# Patient Record
Sex: FEMALE | Race: WHITE | Employment: UNEMPLOYED | URBAN - NONMETROPOLITAN AREA
[De-identification: names, ages, dates, MRNs, and addresses within clinical notes are randomized per-mention and may not be internally consistent; named-entity substitution may affect disease eponyms.]

---

## 2020-09-25 ENCOUNTER — VIRTUAL VISIT (OUTPATIENT)
Dept: FAMILY MEDICINE CLINIC | Age: 45
End: 2020-09-25
Payer: MEDICAID

## 2020-09-25 DIAGNOSIS — Z00.01 ENCOUNTER FOR WELL ADULT EXAM WITH ABNORMAL FINDINGS: Primary | ICD-10-CM

## 2020-09-25 DIAGNOSIS — Z00.01 ENCOUNTER FOR WELL ADULT EXAM WITH ABNORMAL FINDINGS: ICD-10-CM

## 2020-09-25 DIAGNOSIS — L40.9 PSORIASIS: ICD-10-CM

## 2020-09-25 PROCEDURE — 99204 OFFICE O/P NEW MOD 45 MIN: CPT | Performed by: NURSE PRACTITIONER

## 2020-09-25 NOTE — PROGRESS NOTES
Tyshawn Callaway presents today for   Chief Complaint   Patient presents with    New Patient    Joint Swelling     Patient's psoriatic arthritis has been bothering her a lot and swelling up    Psoriasis    Fibromyalgia         Depression Screening:  3 most recent PHQ Screens 9/25/2020   Little interest or pleasure in doing things Several days   Feeling down, depressed, irritable, or hopeless Nearly every day   Total Score PHQ 2 4   Trouble falling or staying asleep, or sleeping too much Several days   Feeling tired or having little energy Nearly every day   Poor appetite, weight loss, or overeating Several days   Feeling bad about yourself - or that you are a failure or have let yourself or your family down Several days   Trouble concentrating on things such as school, work, reading, or watching TV Nearly every day   Moving or speaking so slowly that other people could have noticed; or the opposite being so fidgety that others notice Several days   Thoughts of being better off dead, or hurting yourself in some way Not at all   PHQ 9 Score 14   How difficult have these problems made it for you to do your work, take care of your home and get along with others Somewhat difficult       Learning Assessment:  Learning Assessment 9/25/2020   PRIMARY LEARNER Patient   HIGHEST LEVEL OF EDUCATION - PRIMARY LEARNER  SOME COLLEGE   BARRIERS PRIMARY LEARNER NONE   CO-LEARNER CAREGIVER No   PRIMARY LANGUAGE ENGLISH   LEARNER PREFERENCE PRIMARY LISTENING   ANSWERED BY patient   RELATIONSHIP SELF         Health Maintenance reviewed and discussed and ordered per Provider. Health Maintenance Due   Topic Date Due    DTaP/Tdap/Td series (1 - Tdap) 10/19/1995    PAP AKA CERVICAL CYTOLOGY  10/19/1995    Lipid Screen  10/19/2014    Flu Vaccine (1) 09/01/2020   . Coordination of Care:  1. Have you been to the ER, urgent care clinic since your last visit? Hospitalized since your last visit? No    2.  Have you seen or consulted any other health care providers outside of the [address] [place] [address] since your last visit? Include any pap smears or colon screening.  No

## 2020-09-28 NOTE — PATIENT INSTRUCTIONS
Psoriasis: Care Instructions Your Care Instructions Psoriasis (say \"jnu-GS-rh-zackary\") is a long-term skin problem that causes thick, white, silvery, or red patches on the skin. The patches may be small or large, and they occur most often on the knees, elbows, scalp, hands, feet, or lower back. The skin may be scaly. If the condition is severe, your skin can become itchy and tender. Psoriasis also can be embarrassing if the patches are on visible areas. You can treat psoriasis with good care at home and with medicine from your doctor. You may put medicine on your skin and take pills or have shots to stop the redness and swelling. Your doctor also may suggest ultraviolet light treatments. Follow-up care is a key part of your treatment and safety. Be sure to make and go to all appointments, and call your doctor if you are having problems. It's also a good idea to know your test results and keep a list of the medicines you take. How can you care for yourself at home? · If your doctor prescribes medicine, use it exactly as prescribed. Follow your doctor's advice for sunlight or ultraviolet light treatment. Call your doctor if you think you are having a problem with your medicine. · Protect your skin: 
? Keep your skin moist. After bathing, put an ointment, cream, or lotion on your skin while it is still damp. This seals in moisture. Use over-the-counter products that your doctor suggests. These may include Cetaphil, Lubriderm, or Eucerin. Petroleum jelly (such as Vaseline) and vegetable shortening (such as Crisco) also work. ? If you have psoriasis on your scalp, use a shampoo with salicylic acid, such as Neutrogena T/Sal. 
? Avoid harsh skin products, such as those that contain alcohol. ? Cover your skin in cold weather. ? Try to prevent sunburn.  Although short periods of sun exposure reduce psoriasis in most people, too much sun can damage the skin and cause skin cancer. In addition, sunburns can trigger psoriasis. Use sunscreen on areas of your skin that do not have psoriasis. Make sure to use a broad-spectrum sunscreen that has a sun protection factor (SPF) of 30 or higher. Use it every day, even when it is cloudy. ? Take care to avoid accidents such as cutting or scraping your skin. An injury to the skin can cause psoriasis patches to form anywhere on the body, including the area of the injury. ? Avoid tight shoes, clothing, watchbands, and hats. These may irritate your skin. ? Use a vaporizer or humidifier to add moisture to your bedroom. Follow the directions for cleaning the machine. · Try making one or more changes to your daily habits to help with managing your psoriasis. For example: ? Try to control stress and anxiety. They may cause psoriasis to appear suddenly or can make symptoms worse. ? If you smoke, think about quitting. If you need help quitting, talk to your doctor about stop-smoking programs and medicines. ? If you drink, limit or reduce the amount of alcohol you drink. ? If you are overweight, see if you can lose some weight. · Seek support from family and friends. Talk to a counselor or other professional if you feel sad about your condition and need more help. When should you call for help? Call your doctor now or seek immediate medical care if: 
  · You have signs of infection, such as: 
? Increased pain, swelling, warmth, or redness. ? Red streaks leading from the area. ? Pus draining from the area. ? A fever. Watch closely for changes in your health, and be sure to contact your doctor if: 
  · You have swelling, stiffness, or pain in your joints.  
  · You do not get better as expected. Where can you learn more? Go to http://www.gray.com/ Enter O300 in the search box to learn more about \"Psoriasis: Care Instructions. \" Current as of: July 2, 2020               Content Version: 12.6 © 3768-7184 [employer]. Care instructions adapted under license by [employer] (which disclaims liability or warranty for this information). If you have questions about a medical condition or this instruction, always ask your healthcare professional. [address] any warranty or liability for your use of this information.

## 2020-09-28 NOTE — PROGRESS NOTES
Diana Garcia is a 38 y. o.female who presents today via virtual video visit for   Chief Complaint   Patient presents with    New Patient    Joint Swelling     Patient's psoriatic arthritis has been bothering her a lot and swelling up    Psoriasis    Fibromyalgia       58-year-old female presents today via virtual video visit to establish care. Patient states she has medical history significant for psoriatic attic arthritis as well as fibromyalgia. She states she was \"managing this herself with herbs and tinctures at home \". She states that she finds that the \"itching is way more severe \". She states she has been dealing with this for the past 10 years. She states she has not seen rheumatology in many years. Patient states that due to financial constraints she does not choose to see rheumatology at this time. Subjective:           Past Medical History:   Diagnosis Date    Fibromyalgia     IBS (irritable bowel syndrome)     Psoriasis     Psoriatic arthritis (HCC)      Past Surgical History:   Procedure Laterality Date    HX ORTHOPAEDIC      ankle    HX WISDOM TEETH EXTRACTION       Social History     Tobacco Use    Smoking status: Never Smoker    Smokeless tobacco: Never Used   Substance and Sexual Activity    Alcohol use: Yes     Comment: social    Drug use: Never       Allergies   Allergen Reactions    Penicillins Hives     The patient has a family history of    REVIEW OF SYSTEMS  Review of Systems   Constitutional: Negative for chills and fever. HENT: Negative for ear discharge, ear pain, hearing loss, sinus pain and sore throat. Eyes: Negative for pain. Respiratory: Negative for cough and shortness of breath. Cardiovascular: Negative for chest pain, palpitations and leg swelling. Gastrointestinal: Negative for abdominal pain, nausea and vomiting. Genitourinary: Negative for dysuria, frequency and urgency. Musculoskeletal: Negative for falls, myalgias and neck pain.    Skin: Positive for rash. Neurological: Negative for dizziness, tingling, tremors and headaches. Psychiatric/Behavioral: Negative for depression, substance abuse and suicidal ideas. The patient is not nervous/anxious and does not have insomnia. Objective: There were no vitals taken for this visit. No current outpatient medications     PHYSICAL EXAM  Physical Exam  Constitutional:       Appearance: She is obese. HENT:      Head: Normocephalic and atraumatic. Eyes:      General: No scleral icterus. Right eye: No discharge. Left eye: No discharge. Pulmonary:      Effort: Pulmonary effort is normal. No respiratory distress. Musculoskeletal:         General: No swelling. Right lower leg: No edema. Left lower leg: No edema. Skin:     Coloration: Skin is not pale. Findings: No erythema or rash. Neurological:      General: No focal deficit present. Mental Status: She is alert and oriented to person, place, and time. Psychiatric:         Mood and Affect: Mood normal.         Behavior: Behavior normal.         Thought Content: Thought content normal.         Judgment: Judgment normal.         Assessment/Plan:     Diagnoses and all orders for this visit:    1. Encounter for well adult exam with abnormal findings  -     CBC W/O DIFF; Future  -     LIPID PANEL; Future  -     METABOLIC PANEL, COMPREHENSIVE; Future  -     VITAMIN B12; Future  -     VITAMIN D, 25 HYDROXY; Future  -     TSH 3RD GENERATION; Future  -Labs today any changes to current treatment contingent upon those findings    2. Psoriasis  -     REFERRAL TO DERMATOLOGY  Patient is agreeable to referral to dermatology      Follow-up and Dispositions    · Return in about 1 month (around 10/25/2020) for in office, reassessment.           Terri Wolfe, who was evaluated through a synchronous (real-time) audio-video encounter, and/or her healthcare decision maker, is aware that it is a billable service, with coverage as determined by her insurance carrier. She provided verbal consent to proceed: Yes, and patient identification was verified. It was conducted pursuant to the emergency declaration under the [facility], [address] [place] [address] authority and the [employer] General Act. A caregiver was present when appropriate. Ability to conduct physical exam was limited. I was in the office. The patient was at home. Disclaimer:    I have discussed the diagnosis with the patient and the intended plan as seen above. The patient understands our medical plan. The risks, benefits and significant side effects of all medications have been reviewed. Anticipated time course and progression of condition reviewed. All questions have been addressed. She received an after visit summary, with information reviewed, and questions answered. Where appropriate, she is instructed to call the clinic if she has not been notified either by phone or through [address] with the results of her tests or with an appointment plan for any referrals within 1 week(s). The patient  is to call if her condition worsens or fails to improve or if significant side effects are experienced.        Elio Kong NP

## 2020-10-12 ENCOUNTER — TELEPHONE (OUTPATIENT)
Dept: FAMILY MEDICINE CLINIC | Age: 45
End: 2020-10-12

## 2020-10-12 NOTE — TELEPHONE ENCOUNTER
Patient called in stating that at her last appt. She was suppose to have a script called in for her stomach. Please advise.  benji Childress

## 2020-10-21 NOTE — TELEPHONE ENCOUNTER
She said that you told her that you would try her on an old school drug due to her fibromyalgia and IBS. She said she has a lot of GI issues due to this. She said that it she talked to you about red moon herbs also. She said that during her apt, we had an emergency in the office so maybe this was why you did not remember about the medication.

## 2020-10-22 RX ORDER — DICYCLOMINE HYDROCHLORIDE 10 MG/1
10 CAPSULE ORAL 3 TIMES DAILY
Qty: 90 CAP | Refills: 0 | Status: SHIPPED | OUTPATIENT
Start: 2020-10-22 | End: 2020-12-01

## 2020-10-23 RX ORDER — DICYCLOMINE HYDROCHLORIDE 10 MG/1
10 CAPSULE ORAL 3 TIMES DAILY
Qty: 90 CAP | Refills: 1 | Status: SHIPPED | OUTPATIENT
Start: 2020-10-23 | End: 2021-02-05 | Stop reason: SDUPTHER

## 2020-10-23 NOTE — TELEPHONE ENCOUNTER
[facility] The first prescription printed out instead of going to the pharmacy.

## 2020-12-01 ENCOUNTER — OFFICE VISIT (OUTPATIENT)
Dept: FAMILY MEDICINE CLINIC | Age: 45
End: 2020-12-01
Payer: MEDICAID

## 2020-12-01 VITALS
HEART RATE: 90 BPM | WEIGHT: 190 LBS | BODY MASS INDEX: 34.96 KG/M2 | OXYGEN SATURATION: 98 % | HEIGHT: 62 IN | DIASTOLIC BLOOD PRESSURE: 100 MMHG | RESPIRATION RATE: 19 BRPM | TEMPERATURE: 97 F | SYSTOLIC BLOOD PRESSURE: 170 MMHG

## 2020-12-01 DIAGNOSIS — L40.50 PSORIATIC ARTHRITIS (HCC): ICD-10-CM

## 2020-12-01 DIAGNOSIS — M54.31 SCIATICA OF RIGHT SIDE: ICD-10-CM

## 2020-12-01 DIAGNOSIS — M79.7 FIBROMYALGIA: Primary | ICD-10-CM

## 2020-12-01 DIAGNOSIS — R03.0 ELEVATED BLOOD-PRESSURE READING WITHOUT DIAGNOSIS OF HYPERTENSION: ICD-10-CM

## 2020-12-01 PROCEDURE — 96372 THER/PROPH/DIAG INJ SC/IM: CPT

## 2020-12-01 PROCEDURE — 99214 OFFICE O/P EST MOD 30 MIN: CPT

## 2020-12-01 RX ORDER — CYCLOBENZAPRINE HCL 10 MG
TABLET ORAL
Qty: 30 TAB | Refills: 1 | Status: SHIPPED | OUTPATIENT
Start: 2020-12-01 | End: 2021-01-18

## 2020-12-01 RX ORDER — DEXTROMETHORPHAN HYDROBROMIDE, GUAIFENESIN 5; 100 MG/5ML; MG/5ML
650 LIQUID ORAL EVERY 8 HOURS
COMMUNITY
End: 2021-10-27 | Stop reason: ALTCHOICE

## 2020-12-01 RX ORDER — PREDNISONE 10 MG/1
TABLET ORAL
Qty: 21 TAB | Refills: 0 | Status: SHIPPED | OUTPATIENT
Start: 2020-12-01 | End: 2021-01-05 | Stop reason: ALTCHOICE

## 2020-12-01 RX ORDER — IBUPROFEN 200 MG
600 TABLET ORAL
COMMUNITY
End: 2021-02-23

## 2020-12-01 NOTE — PROGRESS NOTES
50-year-old female presents today in office for follow-up related to fibromyalgia, psoriatic arthritis, psoriasis as well as right-sided sciatic pain. Patient rates current overall pain 10 out of 10. She is excessively tearful yet consolable during today's assessment. She states it has been many years since she has been followed by rheumatology or dermatology. She is not currently on any medications for her above-mentioned diagnoses. Subjective:           Past Medical History:   Diagnosis Date    Fibromyalgia     IBS (irritable bowel syndrome)     Psoriasis     Psoriatic arthritis (HCC)      Past Surgical History:   Procedure Laterality Date    HX ORTHOPAEDIC      ankle    HX WISDOM TEETH EXTRACTION       Social History     Tobacco Use    Smoking status: Never Smoker    Smokeless tobacco: Never Used   Substance and Sexual Activity    Alcohol use: Yes     Comment: social    Drug use: Never     Current Outpatient Medications   Medication Sig Dispense Refill    ibuprofen (MOTRIN) 200 mg tablet Take 600 mg by mouth every six (6) hours as needed for Pain.  acetaminophen (Tylenol Arthritis Pain) 650 mg TbER Take 650 mg by mouth every eight (8) hours.  predniSONE (STERAPRED DS) 10 mg dose pack See administration instruction per 10mg dose pack 21 Tab 0    cyclobenzaprine (FLEXERIL) 10 mg tablet 1 tab po q8h prn pain 30 Tab 1    dicyclomine (BENTYL) 10 mg capsule Take 1 Cap by mouth three (3) times daily. 90 Cap 1     Allergies   Allergen Reactions    Penicillins Hives     The patient has a family history of    REVIEW OF SYSTEMS  Review of Systems   Constitutional: Negative for chills and fever. HENT: Negative for ear discharge, ear pain, hearing loss, sinus pain and sore throat. Eyes: Negative for pain. Respiratory: Negative for cough and shortness of breath. Cardiovascular: Negative for chest pain, palpitations and leg swelling.    Gastrointestinal: Negative for abdominal pain, nausea and vomiting. Genitourinary: Negative for dysuria, frequency and urgency. Musculoskeletal: Positive for back pain, joint pain, myalgias and neck pain. Negative for falls. Skin: Positive for itching and rash. Neurological: Negative for dizziness, tingling, tremors and headaches. Psychiatric/Behavioral: Negative for depression, substance abuse and suicidal ideas. The patient is not nervous/anxious and does not have insomnia. Objective:     Visit Vitals  BP (!) 170/100 (BP 1 Location: Left arm, BP Patient Position: Sitting)   Pulse 90   Temp 97 °F (36.1 °C) (Temporal)   Resp 19   Ht 5' 2\" (1.575 m)   Wt 190 lb (86.2 kg)   SpO2 98%   BMI 34.75 kg/m²       Current Outpatient Medications   Medication Instructions    acetaminophen (TYLENOL ARTHRITIS PAIN) 650 mg, Oral, EVERY 8 HOURS    cyclobenzaprine (FLEXERIL) 10 mg tablet 1 tab po q8h prn pain    dicyclomine (BENTYL) 10 mg, Oral, 3 TIMES DAILY    ibuprofen (MOTRIN) 600 mg, Oral, EVERY 6 HOURS AS NEEDED    predniSONE (STERAPRED DS) 10 mg dose pack See administration instruction per 10mg dose pack        PHYSICAL EXAM  Physical Exam  Constitutional:       General: She is in acute distress. Appearance: She is not ill-appearing. HENT:      Head: Normocephalic and atraumatic. Right Ear: Tympanic membrane, ear canal and external ear normal.      Left Ear: Tympanic membrane, ear canal and external ear normal.      Nose: Nose normal.      Mouth/Throat:      Mouth: Mucous membranes are moist.      Pharynx: Oropharynx is clear. Eyes:      Extraocular Movements: Extraocular movements intact. Conjunctiva/sclera: Conjunctivae normal.      Pupils: Pupils are equal, round, and reactive to light. Neck:      Musculoskeletal: Normal range of motion and neck supple. No neck rigidity or muscular tenderness. Cardiovascular:      Rate and Rhythm: Normal rate and regular rhythm. Pulses: Normal pulses.       Heart sounds: Normal heart sounds. Pulmonary:      Effort: Pulmonary effort is normal.      Breath sounds: Normal breath sounds. Musculoskeletal:         General: Tenderness (rt buttocks) present. Lymphadenopathy:      Cervical: No cervical adenopathy. Skin:     General: Skin is warm and dry. Neurological:      Mental Status: She is alert and oriented to person, place, and time. Psychiatric:         Mood and Affect: Mood normal.         Behavior: Behavior normal.         Thought Content: Thought content normal.         Judgment: Judgment normal.         Assessment/Plan:     Diagnoses and all orders for this visit:    1. Fibromyalgia  -     REFERRAL TO RHEUMATOLOGY  Provided patient with referral advised her to call Dr. Adela Laird office today to set appointment    2. Psoriatic arthritis (HCC)  -     REFERRAL TO RHEUMATOLOGY  -     predniSONE (STERAPRED DS) 10 mg dose pack; See administration instruction per 10mg dose pack  Provided patient with referral advised her to call Dr. Adela Laird office today to set appointment    3. Elevated blood-pressure reading without diagnosis of hypertension  I suspect today's elevations in blood pressure are related to patient's current medical conditions; patient is agreeable to checking home blood pressure pulse rates daily and will bring with her to her follow-up in office. 4. Sciatica of right side  -     cyclobenzaprine (FLEXERIL) 10 mg tablet; 1 tab po q8h prn pain    Provided patient with referral advised her to call Dr. Adela Laird office today to set appointment    Follow-up and Dispositions    · Return in about 1 month (around 1/1/2021) for in office, bp check reassess outcome of  apt. Disclaimer:    I have discussed the diagnosis with the patient and the intended plan as seen above. The patient understands our medical plan. The risks, benefits and significant side effects of all medications have been reviewed.  Anticipated time course and progression of condition reviewed. All questions have been addressed. She received an after visit summary, with information reviewed, and questions answered. Where appropriate, she is instructed to call the clinic if she has not been notified either by phone or through [address] with the results of her tests or with an appointment plan for any referrals within 1 week(s). The patient  is to call if her condition worsens or fails to improve or if significant side effects are experienced.        Kelly Lilly, NP

## 2020-12-01 NOTE — PROGRESS NOTES
Luisito Khan presents today for No chief complaint on file. Is someone accompanying this pt? No    Is the patient using any DME equipment during OV? No    Depression Screening:  3 most recent PHQ Screens 9/25/2020   Little interest or pleasure in doing things Several days   Feeling down, depressed, irritable, or hopeless Nearly every day   Total Score PHQ 2 4   Trouble falling or staying asleep, or sleeping too much Several days   Feeling tired or having little energy Nearly every day   Poor appetite, weight loss, or overeating Several days   Feeling bad about yourself - or that you are a failure or have let yourself or your family down Several days   Trouble concentrating on things such as school, work, reading, or watching TV Nearly every day   Moving or speaking so slowly that other people could have noticed; or the opposite being so fidgety that others notice Several days   Thoughts of being better off dead, or hurting yourself in some way Not at all   PHQ 9 Score 14   How difficult have these problems made it for you to do your work, take care of your home and get along with others Somewhat difficult       Learning Assessment:  Learning Assessment 9/25/2020   PRIMARY LEARNER Patient   HIGHEST LEVEL OF EDUCATION - PRIMARY LEARNER  SOME COLLEGE   BARRIERS PRIMARY LEARNER NONE   CO-LEARNER CAREGIVER No   PRIMARY LANGUAGE ENGLISH   LEARNER PREFERENCE PRIMARY LISTENING   ANSWERED BY patient   RELATIONSHIP SELF         Health Maintenance reviewed and discussed and ordered per Provider. Health Maintenance Due   Topic Date Due    DTaP/Tdap/Td series (1 - Tdap) 10/19/1995    PAP AKA CERVICAL CYTOLOGY  10/19/1995    Lipid Screen  10/19/2014    Flu Vaccine (1) 09/01/2020   . Coordination of Care:  1. Have you been to the ER, urgent care clinic since your last visit? Hospitalized since your last visit? No    2.  Have you seen or consulted any other health care providers outside of the [facility] since your last visit? Include any pap smears or colon screening.  No

## 2021-01-02 ENCOUNTER — HOSPITAL ENCOUNTER (OUTPATIENT)
Dept: LAB | Age: 46
Discharge: HOME | End: 2021-01-02

## 2021-01-05 ENCOUNTER — OFFICE VISIT (OUTPATIENT)
Dept: FAMILY MEDICINE CLINIC | Age: 46
End: 2021-01-05
Payer: MEDICAID

## 2021-01-05 VITALS
HEART RATE: 83 BPM | BODY MASS INDEX: 34.78 KG/M2 | RESPIRATION RATE: 19 BRPM | HEIGHT: 62 IN | TEMPERATURE: 97 F | SYSTOLIC BLOOD PRESSURE: 130 MMHG | OXYGEN SATURATION: 98 % | DIASTOLIC BLOOD PRESSURE: 88 MMHG | WEIGHT: 189 LBS

## 2021-01-05 DIAGNOSIS — K58.2 IRRITABLE BOWEL SYNDROME WITH BOTH CONSTIPATION AND DIARRHEA: ICD-10-CM

## 2021-01-05 DIAGNOSIS — E53.8 VITAMIN B12 DEFICIENCY: ICD-10-CM

## 2021-01-05 DIAGNOSIS — L40.9 PSORIASIS: ICD-10-CM

## 2021-01-05 DIAGNOSIS — R22.9 SKIN NODULE: ICD-10-CM

## 2021-01-05 DIAGNOSIS — F41.8 MIXED ANXIETY AND DEPRESSIVE DISORDER: Primary | ICD-10-CM

## 2021-01-05 DIAGNOSIS — M79.7 FIBROMYALGIA: ICD-10-CM

## 2021-01-05 DIAGNOSIS — E55.9 VITAMIN D DEFICIENCY: ICD-10-CM

## 2021-01-05 DIAGNOSIS — L40.50 PSORIATIC ARTHRITIS (HCC): ICD-10-CM

## 2021-01-05 LAB
25(OH)D3+25(OH)D2 SERPL-MCNC: 24.3 NG/ML (ref 30–100)
ALBUMIN SERPL-MCNC: 4.7 G/DL (ref 3.8–4.8)
ALBUMIN/GLOB SERPL: 1.7 {RATIO} (ref 1.2–2.2)
ALP SERPL-CCNC: 67 IU/L (ref 39–117)
ALT SERPL-CCNC: 17 IU/L (ref 0–32)
AST SERPL-CCNC: 13 IU/L (ref 0–40)
BILIRUB SERPL-MCNC: 0.3 MG/DL (ref 0–1.2)
BUN SERPL-MCNC: 12 MG/DL (ref 6–24)
BUN/CREAT SERPL: 15 (ref 9–23)
CALCIUM SERPL-MCNC: 9.8 MG/DL (ref 8.7–10.2)
CHLORIDE SERPL-SCNC: 102 MMOL/L (ref 96–106)
CHOLEST SERPL-MCNC: 254 MG/DL (ref 100–199)
CO2 SERPL-SCNC: 22 MMOL/L (ref 20–29)
CREAT SERPL-MCNC: 0.82 MG/DL (ref 0.57–1)
ERYTHROCYTE [DISTWIDTH] IN BLOOD BY AUTOMATED COUNT: 12.2 % (ref 11.7–15.4)
GLOBULIN SER CALC-MCNC: 2.7 G/DL (ref 1.5–4.5)
GLUCOSE SERPL-MCNC: 104 MG/DL (ref 65–99)
HCT VFR BLD AUTO: 42.3 % (ref 34–46.6)
HDLC SERPL-MCNC: 52 MG/DL
HGB BLD-MCNC: 14.4 G/DL (ref 11.1–15.9)
LDLC SERPL CALC-MCNC: 173 MG/DL (ref 0–99)
MCH RBC QN AUTO: 30.4 PG (ref 26.6–33)
MCHC RBC AUTO-ENTMCNC: 34 G/DL (ref 31.5–35.7)
MCV RBC AUTO: 89 FL (ref 79–97)
PLATELET # BLD AUTO: 366 X10E3/UL (ref 150–450)
POTASSIUM SERPL-SCNC: 5 MMOL/L (ref 3.5–5.2)
PROT SERPL-MCNC: 7.4 G/DL (ref 6–8.5)
RBC # BLD AUTO: 4.73 X10E6/UL (ref 3.77–5.28)
SODIUM SERPL-SCNC: 139 MMOL/L (ref 134–144)
TRIGL SERPL-MCNC: 160 MG/DL (ref 0–149)
TSH SERPL DL<=0.005 MIU/L-ACNC: 1 UIU/ML (ref 0.45–4.5)
VIT B12 SERPL-MCNC: 302 PG/ML (ref 232–1245)
VLDLC SERPL CALC-MCNC: 29 MG/DL (ref 5–40)
WBC # BLD AUTO: 5.5 X10E3/UL (ref 3.4–10.8)

## 2021-01-05 PROCEDURE — 99215 OFFICE O/P EST HI 40 MIN: CPT | Performed by: NURSE PRACTITIONER

## 2021-01-05 RX ORDER — CYANOCOBALAMIN 1000 UG/ML
INJECTION, SOLUTION INTRAMUSCULAR; SUBCUTANEOUS
Qty: 12 VIAL | Refills: 2
Start: 2021-01-05 | End: 2021-01-06 | Stop reason: SDUPTHER

## 2021-01-05 RX ORDER — DULOXETIN HYDROCHLORIDE 30 MG/1
CAPSULE, DELAYED RELEASE ORAL
Qty: 60 CAP | Refills: 1 | Status: SHIPPED | OUTPATIENT
Start: 2021-01-05 | End: 2021-03-09

## 2021-01-05 RX ORDER — PRAMOXINE HYDROCHLORIDE 10 MG/G
AEROSOL, FOAM TOPICAL
Qty: 1 CAN | Refills: 2 | Status: SHIPPED | OUTPATIENT
Start: 2021-01-05 | End: 2021-03-09

## 2021-01-05 RX ORDER — NAPROXEN SODIUM 220 MG
TABLET ORAL
Qty: 20 SYRINGE | Refills: 2 | Status: SHIPPED | OUTPATIENT
Start: 2021-01-05 | End: 2021-05-14

## 2021-01-05 RX ORDER — PREDNISONE 10 MG/1
TABLET ORAL
Qty: 21 TAB | Refills: 0 | Status: SHIPPED | OUTPATIENT
Start: 2021-01-05 | End: 2021-02-05

## 2021-01-05 RX ORDER — ERGOCALCIFEROL 1.25 MG/1
50000 CAPSULE ORAL
Qty: 12 CAP | Refills: 1 | Status: SHIPPED | OUTPATIENT
Start: 2021-01-05 | End: 2021-06-23 | Stop reason: SDUPTHER

## 2021-01-05 RX ORDER — DULOXETIN HYDROCHLORIDE 30 MG/1
30 CAPSULE, DELAYED RELEASE ORAL DAILY
Qty: 60 CAP | Refills: 1 | Status: SHIPPED | OUTPATIENT
Start: 2021-01-05 | End: 2021-01-05 | Stop reason: CLARIF

## 2021-01-05 NOTE — PROGRESS NOTES
Yvette Crane presents today for No chief complaint on file. Is someone accompanying this pt? No    Is the patient using any DME equipment during OV? No    Depression Screening:  3 most recent PHQ Screens 1/5/2021   Little interest or pleasure in doing things More than half the days   Feeling down, depressed, irritable, or hopeless More than half the days   Total Score PHQ 2 4   Trouble falling or staying asleep, or sleeping too much Nearly every day   Feeling tired or having little energy Nearly every day   Poor appetite, weight loss, or overeating Nearly every day   Feeling bad about yourself - or that you are a failure or have let yourself or your family down More than half the days   Trouble concentrating on things such as school, work, reading, or watching TV Nearly every day   Moving or speaking so slowly that other people could have noticed; or the opposite being so fidgety that others notice More than half the days   Thoughts of being better off dead, or hurting yourself in some way Not at all   PHQ 9 Score 20   How difficult have these problems made it for you to do your work, take care of your home and get along with others Very difficult       Learning Assessment:  Learning Assessment 9/25/2020   PRIMARY LEARNER Patient   HIGHEST LEVEL OF EDUCATION - PRIMARY LEARNER  SOME COLLEGE   BARRIERS PRIMARY LEARNER NONE   CO-LEARNER CAREGIVER No   PRIMARY LANGUAGE ENGLISH   LEARNER PREFERENCE PRIMARY LISTENING   ANSWERED BY patient   RELATIONSHIP SELF         Health Maintenance reviewed and discussed and ordered per Provider. Health Maintenance Due   Topic Date Due    DTaP/Tdap/Td series (1 - Tdap) 10/19/1995    PAP AKA CERVICAL CYTOLOGY  10/19/1995    Flu Vaccine (1) 09/01/2020   . Coordination of Care:  1. Have you been to the ER, urgent care clinic since your last visit? Hospitalized since your last visit? No    2.  Have you seen or consulted any other health care providers outside of the [facility] [address] since your last visit? Include any pap smears or colon screening.  No

## 2021-01-05 NOTE — PROGRESS NOTES
Tracy Lewis is a 54 y. o.female presents with   No chief complaint on file. 74-year-old female presents today in office 1 month follow-up. Patient provided with referral for rheumatology for her psoriasis and psoriatic arthritis she states that appointment is upcoming February 8, 2021. Patient states she continues to have psoriatic flare. Patient complains of increasing mixed anxiety and depression. She states many years ago she was on Cymbalta which helped alleviate signs or symptoms. She denies any homicidal or suicidal ideation. Patient complains of \"growth \"from her rectum which she states she noticed roughly 2 months ago she denies any pain to the site. Of note patient also has skin nodule to right buttock has not had dermatology follow-up. Patient had all labs done prior to today's visit. Subjective:           Past Medical History:   Diagnosis Date    Fibromyalgia     IBS (irritable bowel syndrome)     Psoriasis     Psoriatic arthritis (HCC)      Past Surgical History:   Procedure Laterality Date    HX ORTHOPAEDIC      ankle    HX WISDOM TEETH EXTRACTION       Social History     Tobacco Use    Smoking status: Never Smoker    Smokeless tobacco: Never Used   Substance and Sexual Activity    Alcohol use: Yes     Comment: social    Drug use: Never     Current Outpatient Medications   Medication Sig Dispense Refill    ergocalciferol (ERGOCALCIFEROL) 1,250 mcg (50,000 unit) capsule Take 1 Cap by mouth every seven (7) days. 12 Cap 1    pramoxine (PROCTOFOAM) 1 % topical foam Apply  to affected area three (3) times daily as needed for Hemorrhoids.  1 Can 2    predniSONE (STERAPRED DS) 10 mg dose pack See administration instruction per 10mg dose pack 21 Tab 0    cyanocobalamin (VITAMIN B12) 1,000 mcg/mL injection 1 ml sc once daily x 7 days; then 1 ml sc once weekly x 4 wks; then 1 ml sc once monthly thereafter 12 Vial 2    Insulin Syringe-Needle U-100 (BD Insulin Syringe Ultra-Fine) 0.5 mL 31 gauge x 5/16\" syrg Use as directed with vit b12 injections 20 Syringe 2    DULoxetine (CYMBALTA) 30 mg capsule 1 cap po once daily x 7 days then increase to 2 caps po once daily thereafter 60 Cap 1    ibuprofen (MOTRIN) 200 mg tablet Take 600 mg by mouth every six (6) hours as needed for Pain.  acetaminophen (Tylenol Arthritis Pain) 650 mg TbER Take 650 mg by mouth every eight (8) hours.  cyclobenzaprine (FLEXERIL) 10 mg tablet 1 tab po q8h prn pain 30 Tab 1    dicyclomine (BENTYL) 10 mg capsule Take 1 Cap by mouth three (3) times daily. 90 Cap 1     Allergies   Allergen Reactions    Penicillins Hives     The patient has a family history of    REVIEW OF SYSTEMS  Review of Systems   Constitutional: Negative for chills and fever. HENT: Negative for ear discharge, ear pain, hearing loss, sinus pain and sore throat. Eyes: Negative for pain. Respiratory: Negative for cough and shortness of breath. Cardiovascular: Negative for chest pain, palpitations and leg swelling. Gastrointestinal: Negative for abdominal pain, nausea and vomiting. Genitourinary: Negative for dysuria, frequency and urgency. Musculoskeletal: Positive for back pain, joint pain, myalgias and neck pain. Negative for falls. Skin: Positive for itching and rash. \"growth to rectum\"   Neurological: Negative for dizziness, tingling, tremors and headaches. Psychiatric/Behavioral: Positive for depression. Negative for substance abuse and suicidal ideas. The patient is nervous/anxious. The patient does not have insomnia.         Objective:     Visit Vitals  /88 (BP 1 Location: Left arm, BP Patient Position: Sitting)   Pulse 83   Temp 97 °F (36.1 °C) (Temporal)   Resp 19   Ht 5' 2\" (1.575 m)   Wt 189 lb (85.7 kg)   SpO2 98%   BMI 34.57 kg/m²       Current Outpatient Medications   Medication Instructions    acetaminophen (TYLENOL ARTHRITIS PAIN) 650 mg, Oral, EVERY 8 HOURS    cyanocobalamin (VITAMIN B12) 1,000 mcg/mL injection 1 ml sc once daily x 7 days; then 1 ml sc once weekly x 4 wks; then 1 ml sc once monthly thereafter    cyclobenzaprine (FLEXERIL) 10 mg tablet 1 tab po q8h prn pain    dicyclomine (BENTYL) 10 mg, Oral, 3 TIMES DAILY    DULoxetine (CYMBALTA) 30 mg capsule 1 cap po once daily x 7 days then increase to 2 caps po once daily thereafter    ergocalciferol (ERGOCALCIFEROL) 50,000 Units, Oral, EVERY 7 DAYS    ibuprofen (MOTRIN) 600 mg, Oral, EVERY 6 HOURS AS NEEDED    Insulin Syringe-Needle U-100 (BD Insulin Syringe Ultra-Fine) 0.5 mL 31 gauge x 5/16\" syrg Use as directed with vit b12 injections    pramoxine (PROCTOFOAM) 1 % topical foam Topical, 3 TIMES DAILY AS NEEDED    predniSONE (STERAPRED DS) 10 mg dose pack See administration instruction per 10mg dose pack        PHYSICAL EXAM  Physical Exam  Constitutional:       General: She is not in acute distress. Appearance: She is obese. She is not ill-appearing. HENT:      Head: Normocephalic and atraumatic. Nose: Nose normal.      Mouth/Throat:      Mouth: Mucous membranes are moist.      Pharynx: Oropharynx is clear. Eyes:      Extraocular Movements: Extraocular movements intact. Conjunctiva/sclera: Conjunctivae normal.      Pupils: Pupils are equal, round, and reactive to light. Neck:      Musculoskeletal: Normal range of motion and neck supple. No neck rigidity or muscular tenderness. Cardiovascular:      Rate and Rhythm: Normal rate and regular rhythm. Pulses: Normal pulses. Heart sounds: Normal heart sounds. Pulmonary:      Effort: Pulmonary effort is normal.      Breath sounds: Normal breath sounds. Lymphadenopathy:      Cervical: No cervical adenopathy. Skin:     General: Skin is warm and dry. Capillary Refill: Capillary refill takes less than 2 seconds. Findings: Rash (psoriatic plagues scattered amongst anterior bilat lower ext) present.       Comments: Flesh colored nodule rt buttocks; Neurological:      Mental Status: She is alert and oriented to person, place, and time. Psychiatric:         Mood and Affect: Mood normal.         Behavior: Behavior normal.         Thought Content: Thought content normal.         Judgment: Judgment normal.         Assessment/Plan:     Diagnoses and all orders for this visit:    1. Mixed anxiety and depressive disorder  -     DULoxetine (CYMBALTA) 30 mg capsule; 1 cap po once daily x 7 days then increase to 2 caps po once daily thereafter  Starting patient on Cymbalta she will follow-up in 1 month or sooner as needed. If at any time you feel unsafe and may hurt yourself or others, either call '[phone]' or go to the nearest emergency room. Contact the nearest Hospital Emergency Room in cases that result in a medical emergency. Contact a friend / family member to discuss what you are feeling and solicit support. 2. Fibromyalgia  -     DULoxetine (CYMBALTA) 30 mg capsule; 1 cap po once daily x 7 days then increase to 2 caps po once daily thereafter  Placing patient on Cymbalta for her mixed anxiety and depression as well as for her fibromyalgia she will follow-up in 1 month with me she also has upcoming appointment with rheumatology      3. Vitamin B12 deficiency  -     cyanocobalamin (VITAMIN B12) 1,000 mcg/mL injection; 1 ml sc once daily x 7 days; then 1 ml sc once weekly x 4 wks; then 1 ml sc once monthly thereafter  -     Insulin Syringe-Needle U-100 (BD Insulin Syringe Ultra-Fine) 0.5 mL 31 gauge x 5/16\" syrg; Use as directed with vit b12 injections    4. Skin nodule  -     REFERRAL TO GENERAL SURGERY    5. Psoriasis  Keep f/u with rheumatology    6. Vitamin D deficiency  Starting weekly vit d    7. Psoriatic arthritis ([facility] [address]  Keep f/u with rheumatology    8. Irritable bowel syndrome with both constipation and diarrhea  The current medical regimen is effective;  continue present plan and medications.     Other orders  -     ergocalciferol (ERGOCALCIFEROL) 1,250 mcg (50,000 unit) capsule; Take 1 Cap by mouth every seven (7) days.  -     pramoxine (PROCTOFOAM) 1 % topical foam; Apply  to affected area three (3) times daily as needed for Hemorrhoids.  -     predniSONE (STERAPRED DS) 10 mg dose pack; See administration instruction per 10mg dose pack        Follow-up and Dispositions    · Return in about 1 month (around 2/5/2021) for virtual, f/u depression med.                Disclaimer:    I have discussed the diagnosis with the patient and the intended plan as seen above.The patient understands our medical plan. The risks, benefits and significant side effects of all medications have been reviewed. Anticipated time course and progression of condition reviewed. All questions have been addressed.  She received an after visit summary, with information reviewed, and questions answered.      Where appropriate, she is instructed to call the clinic if she has not been notified either by phone or through MyChart with the results of her tests or with an appointment plan for any referrals within 1 week(s). The patient  is to call if her condition worsens or fails to improve or if significant side effects are experienced.       Mary Thibodeaux, NP

## 2021-01-06 ENCOUNTER — OFFICE VISIT (OUTPATIENT)
Dept: SURGERY | Age: 46
End: 2021-01-06
Payer: MEDICAID

## 2021-01-06 ENCOUNTER — HOSPITAL ENCOUNTER (OUTPATIENT)
Dept: LAB | Age: 46
Discharge: HOME | End: 2021-01-06
Payer: MEDICAID

## 2021-01-06 VITALS — TEMPERATURE: 97.4 F | BODY MASS INDEX: 33.31 KG/M2 | WEIGHT: 188 LBS | HEIGHT: 63 IN

## 2021-01-06 DIAGNOSIS — E53.8 VITAMIN B12 DEFICIENCY: ICD-10-CM

## 2021-01-06 DIAGNOSIS — R22.2 NODULE OF BUTTOCK: Primary | ICD-10-CM

## 2021-01-06 PROCEDURE — 88304 TISSUE EXAM BY PATHOLOGIST: CPT

## 2021-01-06 PROCEDURE — 11302 SHAVE SKIN LESION 1.1-2.0 CM: CPT | Performed by: SURGERY

## 2021-01-06 PROCEDURE — 99203 OFFICE O/P NEW LOW 30 MIN: CPT | Performed by: SURGERY

## 2021-01-06 RX ORDER — CYANOCOBALAMIN 1000 UG/ML
INJECTION, SOLUTION INTRAMUSCULAR; SUBCUTANEOUS
Qty: 12 VIAL | Refills: 2
Start: 2021-01-06 | End: 2021-01-26 | Stop reason: SDUPTHER

## 2021-01-06 NOTE — PROGRESS NOTES
History of Present Illness  Dawson Dugan is a 54 y.o. female presents for Cyst (on buttocks) and New Patient     This is a 23-year-old female with a fatty nodule of her left buttock. She states it has been there for several years but has a history of psoriasis and this is causing the nodule to become irritated. She feels the area has gotten larger with time. She reports no drainage from her skin. Review of Systems   Constitutional: Negative for chills, fever, malaise/fatigue and weight loss. Respiratory: Negative for cough, hemoptysis, sputum production, shortness of breath and wheezing. Cardiovascular: Negative for chest pain and palpitations. Gastrointestinal: Negative for abdominal pain. Skin: Positive for itching and rash. Current Outpatient Medications:     ergocalciferol (ERGOCALCIFEROL) 1,250 mcg (50,000 unit) capsule, Take 1 Cap by mouth every seven (7) days. , Disp: 12 Cap, Rfl: 1    pramoxine (PROCTOFOAM) 1 % topical foam, Apply  to affected area three (3) times daily as needed for Hemorrhoids. , Disp: 1 Can, Rfl: 2    predniSONE (STERAPRED DS) 10 mg dose pack, See administration instruction per 10mg dose pack, Disp: 21 Tab, Rfl: 0    cyanocobalamin (VITAMIN B12) 1,000 mcg/mL injection, 1 ml sc once daily x 7 days; then 1 ml sc once weekly x 4 wks; then 1 ml sc once monthly thereafter, Disp: 12 Vial, Rfl: 2    Insulin Syringe-Needle U-100 (BD Insulin Syringe Ultra-Fine) 0.5 mL 31 gauge x 5/16\" syrg, Use as directed with vit b12 injections, Disp: 20 Syringe, Rfl: 2    DULoxetine (CYMBALTA) 30 mg capsule, 1 cap po once daily x 7 days then increase to 2 caps po once daily thereafter, Disp: 60 Cap, Rfl: 1    ibuprofen (MOTRIN) 200 mg tablet, Take 600 mg by mouth every six (6) hours as needed for Pain., Disp: , Rfl:     acetaminophen (Tylenol Arthritis Pain) 650 mg TbER, Take 650 mg by mouth every eight (8) hours. , Disp: , Rfl:     cyclobenzaprine (FLEXERIL) 10 mg tablet, 1 tab po q8h prn pain, Disp: 30 Tab, Rfl: 1    dicyclomine (BENTYL) 10 mg capsule, Take 1 Cap by mouth three (3) times daily. , Disp: 90 Cap, Rfl: 1    Allergies   Allergen Reactions    Penicillins Hives       Past Medical History:   Diagnosis Date    Fibromyalgia     IBS (irritable bowel syndrome)     Psoriasis     Psoriatic arthritis (HCC)        Past Surgical History:   Procedure Laterality Date    HX ORTHOPAEDIC      ankle    HX WISDOM TEETH EXTRACTION         No family history on file. Social History     Socioeconomic History    Marital status:      Spouse name: Not on file    Number of children: Not on file    Years of education: Not on file    Highest education level: Not on file   Occupational History    Not on file   Social Needs    Financial resource strain: Not on file    Food insecurity     Worry: Not on file     Inability: Not on file    Transportation needs     Medical: Not on file     Non-medical: Not on file   Tobacco Use    Smoking status: Never Smoker    Smokeless tobacco: Never Used   Substance and Sexual Activity    Alcohol use: Yes     Comment: social    Drug use: Never    Sexual activity: Not on file   Lifestyle    Physical activity     Days per week: Not on file     Minutes per session: Not on file    Stress: Not on file   Relationships    Social connections     Talks on phone: Not on file     Gets together: Not on file     Attends Congregation service: Not on file     Active member of club or organization: Not on file     Attends meetings of clubs or organizations: Not on file     Relationship status: Not on file    Intimate partner violence     Fear of current or ex partner: Not on file     Emotionally abused: Not on file     Physically abused: Not on file     Forced sexual activity: Not on file   Other Topics Concern    Not on file   Social History Narrative    Not on file       XR Results (maximum last 2): No results found for this or any previous visit.     CT Results (maximum last 2): No results found for this or any previous visit. MRI Results (maximum last 2): No results found for this or any previous visit. Nuclear Medicine Results (maximum last 3): No results found for this or any previous visit. US Results (maximum last 2): No results found for this or any previous visit. KAYLIN Results (maximum last 2): No results found for this or any previous visit. IR Results (maximum last 2): No results found for this or any previous visit. VAS/US Results (maximum last 2): No results found for this or any previous visit. PET Results (maximum last 2): No results found for this or any previous visit. Visit Vitals  Temp 97.4 °F (36.3 °C)   Ht 5' 3\" (1.6 m)   Wt 85.3 kg (188 lb)   BMI 33.30 kg/m²        Physical Exam  Constitutional:       Appearance: Normal appearance. She is normal weight. HENT:      Head: Normocephalic and atraumatic. Eyes:      Extraocular Movements: Extraocular movements intact. Conjunctiva/sclera: Conjunctivae normal.      Pupils: Pupils are equal, round, and reactive to light. Pulmonary:      Effort: Pulmonary effort is normal.   Skin:     Comments: 2.0cm fleshy nodule left buttock, non tender   Neurological:      General: No focal deficit present. Mental Status: She is alert and oriented to person, place, and time. Mental status is at baseline. Psychiatric:         Mood and Affect: Mood normal.         Behavior: Behavior normal.         Thought Content: Thought content normal.         Judgment: Judgment normal.     Procedure:     After verbal informed consent was obtained the nodule was cleansed with ChloraPrep and anesthetized with 10 cc of 1% lidocaine with epinephrine. It was then grasped with a forcep and a 15 blade scalpel was used to shave the fleshy nodule at its base. Silver nitrate was applied to the bed of the wound and Mepilex dressing was applied.   The patient tolerated the procedure well and voiced no complaints.    Assessment and Plan:  Nodule of buttock    The patient tolerated shave excision of the lesion in the clinic.  She was instructed she can use Tylenol or Motrin as needed for discomfort.  She can wash and shower over the area daily tomorrow and apply new Mepilex dressing that was provided.  I will call the patient with the pathology report. There is no need for additional follow-up unless she has any questions or concerns in the future.      Kiki Nguyen, DO    CC Providers:  Mary Thibodeaux NP  No ref. provider found

## 2021-01-06 NOTE — PROGRESS NOTES
Samantha Fry presents today for   Chief Complaint   Patient presents with    Cyst     on buttocks       Is someone accompanying this pt? No    Is the patient using any DME equipment during OV? No    Depression Screening:  3 most recent PHQ Screens 1/6/2021   Little interest or pleasure in doing things Several days   Feeling down, depressed, irritable, or hopeless Not at all   Total Score PHQ 2 1   Trouble falling or staying asleep, or sleeping too much -   Feeling tired or having little energy -   Poor appetite, weight loss, or overeating -   Feeling bad about yourself - or that you are a failure or have let yourself or your family down -   Trouble concentrating on things such as school, work, reading, or watching TV -   Moving or speaking so slowly that other people could have noticed; or the opposite being so fidgety that others notice -   Thoughts of being better off dead, or hurting yourself in some way -   PHQ 9 Score -   How difficult have these problems made it for you to do your work, take care of your home and get along with others -       Learning Assessment:  Learning Assessment 9/25/2020   PRIMARY LEARNER Patient   HIGHEST LEVEL OF EDUCATION - PRIMARY LEARNER  SOME COLLEGE   BARRIERS PRIMARY LEARNER NONE   CO-LEARNER CAREGIVER No   PRIMARY LANGUAGE ENGLISH   LEARNER PREFERENCE PRIMARY LISTENING   ANSWERED BY patient   RELATIONSHIP SELF       Abuse Screening:  No flowsheet data found. Fall Risk  No flowsheet data found. Coordination of Care:  1. Have you been to the ER, urgent care clinic since your last visit? Hospitalized since your last visit? No  2. Have you seen or consulted any other health care providers outside of the [address] [place] [address] since your last visit? Include any pap smears or colon screening.  no

## 2021-01-12 ENCOUNTER — TELEPHONE (OUTPATIENT)
Dept: SURGERY | Age: 46
End: 2021-01-12

## 2021-01-12 NOTE — TELEPHONE ENCOUNTER
----- Message from Suri Benjamin LPN sent at 7/7/5765  1:27 PM EST -----  This result was in the error queue please disregard if you have already viewed.

## 2021-01-12 NOTE — TELEPHONE ENCOUNTER
Called patient to review her pathology which is benign. She reports no complaints the site is healing well. There is no need for additional follow up, she agrees with this plan.

## 2021-01-18 DIAGNOSIS — M54.31 SCIATICA OF RIGHT SIDE: ICD-10-CM

## 2021-01-18 RX ORDER — CYCLOBENZAPRINE HCL 10 MG
TABLET ORAL
Qty: 30 TAB | Refills: 1 | Status: SHIPPED | OUTPATIENT
Start: 2021-01-18 | End: 2021-02-23 | Stop reason: SDUPTHER

## 2021-01-26 DIAGNOSIS — E53.8 VITAMIN B12 DEFICIENCY: ICD-10-CM

## 2021-01-26 RX ORDER — CYANOCOBALAMIN 1000 UG/ML
INJECTION, SOLUTION INTRAMUSCULAR; SUBCUTANEOUS
Qty: 12 VIAL | Refills: 2
Start: 2021-01-26 | End: 2021-10-27 | Stop reason: ALTCHOICE

## 2021-02-05 ENCOUNTER — VIRTUAL VISIT (OUTPATIENT)
Dept: FAMILY MEDICINE CLINIC | Age: 46
End: 2021-02-05
Payer: MEDICAID

## 2021-02-05 DIAGNOSIS — F41.8 MIXED ANXIETY AND DEPRESSIVE DISORDER: ICD-10-CM

## 2021-02-05 DIAGNOSIS — J30.9 ALLERGIC RHINITIS, UNSPECIFIED SEASONALITY, UNSPECIFIED TRIGGER: Primary | ICD-10-CM

## 2021-02-05 PROCEDURE — 99442 PR PHYS/QHP TELEPHONE EVALUATION 11-20 MIN: CPT | Performed by: NURSE PRACTITIONER

## 2021-02-05 RX ORDER — ALBUTEROL SULFATE 90 UG/1
AEROSOL, METERED RESPIRATORY (INHALATION)
Qty: 1 INHALER | Refills: 2 | Status: SHIPPED | OUTPATIENT
Start: 2021-02-05

## 2021-02-05 RX ORDER — DICYCLOMINE HYDROCHLORIDE 10 MG/1
10 CAPSULE ORAL 3 TIMES DAILY
Qty: 90 CAP | Refills: 1 | Status: SHIPPED | OUTPATIENT
Start: 2021-02-05 | End: 2021-04-27

## 2021-02-05 NOTE — PROGRESS NOTES
Bria Corbin presents today for   Chief Complaint   Patient presents with    Follow-up         Depression Screening:  3 most recent PHQ Screens 2/5/2021   Little interest or pleasure in doing things More than half the days   Feeling down, depressed, irritable, or hopeless More than half the days   Total Score PHQ 2 4   Trouble falling or staying asleep, or sleeping too much Nearly every day   Feeling tired or having little energy Nearly every day   Poor appetite, weight loss, or overeating Nearly every day   Feeling bad about yourself - or that you are a failure or have let yourself or your family down More than half the days   Trouble concentrating on things such as school, work, reading, or watching TV Nearly every day   Moving or speaking so slowly that other people could have noticed; or the opposite being so fidgety that others notice Several days   Thoughts of being better off dead, or hurting yourself in some way Not at all   PHQ 9 Score 19   How difficult have these problems made it for you to do your work, take care of your home and get along with others Extremely difficult       Learning Assessment:  Learning Assessment 9/25/2020   PRIMARY LEARNER Patient   HIGHEST LEVEL OF EDUCATION - PRIMARY LEARNER  SOME COLLEGE   BARRIERS PRIMARY LEARNER NONE   CO-LEARNER CAREGIVER No   PRIMARY LANGUAGE ENGLISH   LEARNER PREFERENCE PRIMARY LISTENING   ANSWERED BY patient   RELATIONSHIP SELF       Health Maintenance reviewed and discussed and ordered per Provider. Health Maintenance Due   Topic Date Due    COVID-19 Vaccine (1 of 2) 10/19/1990    DTaP/Tdap/Td series (1 - Tdap) 10/19/1995    PAP AKA CERVICAL CYTOLOGY  10/19/1995    Flu Vaccine (1) 09/01/2020   . Coordination of Care:  1. Have you been to the ER, urgent care clinic since your last visit? Hospitalized since your last visit? No    2.  Have you seen or consulted any other health care providers outside of the [address] [place] [address] since your last visit? Include any pap smears or colon screening.  No

## 2021-02-05 NOTE — PROGRESS NOTES
Pursuant to the emergency declaration under the [facility], [facility] waiver authority and the [facility] and [employer] Act, this phone visit was conducted, with patient's consent, to reduce the patient's risk of exposure to COVID-19 and provide continuity of care for an established patient. She and/or health care decision maker is aware that that she may receive a bill for this telephone service, depending on her insurance coverage, and has provided verbal consent to proceed. This is a Patient Initiated Episode with an Established Patient who has not had a related appointment within my department in the past 7 days or scheduled within the next 24 hours. HISTORY OF PRESENTING ILLNESS      Maryuri Maher is a 54 y.o. female evaluated via telephone on 2/5/2021 due to COVID 19 restrictions. Patient unable to participate in Virtual Visit with synchronous audio/visual technology. Patient presents today for 1 month follow-up after starting Cymbalta for mixed anxiety and depression patient was to start with Cymbalta 30 mg once daily for 7 days then increase to 60 mg once daily thereafter however she states she did not realize she was supposed to increase the Cymbalta to 60 mg. She states that the 30 mg has \"helped some but not enough \". She continues to deny homicidal suicidal ideation. PCP Provider  Rehana Huitron NP  Past Medical History:   Diagnosis Date    Fibromyalgia     IBS (irritable bowel syndrome)     Psoriasis     Psoriatic arthritis ([facility] [address]       Past Surgical History:   Procedure Laterality Date    HX ORTHOPAEDIC      ankle    HX WISDOM TEETH EXTRACTION       Allergies   Allergen Reactions    Penicillins Hives      History reviewed. No pertinent family history.    Current Outpatient Medications   Medication Sig    dicyclomine (BENTYL) 10 mg capsule Take 1 Cap by mouth three (3) times daily.  albuterol (PROVENTIL HFA, VENTOLIN HFA, PROAIR HFA) 90 mcg/actuation inhaler 2 puffs q4h prn sob    cyclobenzaprine (FLEXERIL) 10 mg tablet TAKE 1 TABLET BY MOUTH EVERY 8 HOURS AS NEEDED FOR PAIN    ergocalciferol (ERGOCALCIFEROL) 1,250 mcg (50,000 unit) capsule Take 1 Cap by mouth every seven (7) days.  Insulin Syringe-Needle U-100 (BD Insulin Syringe Ultra-Fine) 0.5 mL 31 gauge x 5/16\" syrg Use as directed with vit b12 injections    DULoxetine (CYMBALTA) 30 mg capsule 1 cap po once daily x 7 days then increase to 2 caps po once daily thereafter (Patient taking differently: 2 caps po once daily)    ibuprofen (MOTRIN) 200 mg tablet Take 600 mg by mouth every six (6) hours as needed for Pain.  acetaminophen (Tylenol Arthritis Pain) 650 mg TbER Take 650 mg by mouth every eight (8) hours.  cyanocobalamin (VITAMIN B12) 1,000 mcg/mL injection 1 ml sc once daily x 7 days; then 1 ml sc once weekly x 4 wks; then 1 ml sc once monthly thereafter    pramoxine (PROCTOFOAM) 1 % topical foam Apply  to affected area three (3) times daily as needed for Hemorrhoids. No current facility-administered medications for this visit. There were no vitals filed for this visit.   Social History     Socioeconomic History    Marital status:      Spouse name: Not on file    Number of children: Not on file    Years of education: Not on file    Highest education level: Not on file   Occupational History    Not on file   Social Needs    Financial resource strain: Not on file    Food insecurity     Worry: Not on file     Inability: Not on file    Transportation needs     Medical: Not on file     Non-medical: Not on file   Tobacco Use    Smoking status: Never Smoker    Smokeless tobacco: Never Used   Substance and Sexual Activity    Alcohol use: Yes     Comment: social    Drug use: Never    Sexual activity: Not on file   Lifestyle    Physical activity     Days per week: Not on file Minutes per session: Not on file    Stress: Not on file   Relationships    Social connections     Talks on phone: Not on file     Gets together: Not on file     Attends [employer] service: Not on file     Active member of club or organization: Not on file     Attends meetings of clubs or organizations: Not on file     Relationship status: Not on file    Intimate partner violence     Fear of current or ex partner: Not on file     Emotionally abused: Not on file     Physically abused: Not on file     Forced sexual activity: Not on file   Other Topics Concern    Not on file   Social History Narrative    Not on file       MEDICATIONS     Current Outpatient Medications   Medication Sig    dicyclomine (BENTYL) 10 mg capsule Take 1 Cap by mouth three (3) times daily.  albuterol (PROVENTIL HFA, VENTOLIN HFA, PROAIR HFA) 90 mcg/actuation inhaler 2 puffs q4h prn sob    cyclobenzaprine (FLEXERIL) 10 mg tablet TAKE 1 TABLET BY MOUTH EVERY 8 HOURS AS NEEDED FOR PAIN    ergocalciferol (ERGOCALCIFEROL) 1,250 mcg (50,000 unit) capsule Take 1 Cap by mouth every seven (7) days.  Insulin Syringe-Needle U-100 (BD Insulin Syringe Ultra-Fine) 0.5 mL 31 gauge x 5/16\" syrg Use as directed with vit b12 injections    DULoxetine (CYMBALTA) 30 mg capsule 1 cap po once daily x 7 days then increase to 2 caps po once daily thereafter (Patient taking differently: 2 caps po once daily)    ibuprofen (MOTRIN) 200 mg tablet Take 600 mg by mouth every six (6) hours as needed for Pain.  acetaminophen (Tylenol Arthritis Pain) 650 mg TbER Take 650 mg by mouth every eight (8) hours.  cyanocobalamin (VITAMIN B12) 1,000 mcg/mL injection 1 ml sc once daily x 7 days; then 1 ml sc once weekly x 4 wks; then 1 ml sc once monthly thereafter    pramoxine (PROCTOFOAM) 1 % topical foam Apply  to affected area three (3) times daily as needed for Hemorrhoids. No current facility-administered medications for this visit.         I have reviewed the nurses notes, vitals, problem list, allergy list, medical history, family, social history and medications. REVIEW OF SYMPTOMS     General: Pt denies excessive weight gain or loss. Pt is able to conduct ADL's    Psychiatric: Denies confusion, insomnia, depression       PHYSICAL EXAM       Due to this being a telephone encounter a very limited exam was performed  Neurological: A&Ox3, no slurred speech, answering questions appropriately  Respiratory: Non labored, talking in complete sentences, no audible wheeze over the phone        ASSESSMENT        Diagnoses and all orders for this visit:    1. Allergic rhinitis, unspecified seasonality, unspecified trigger  -     albuterol (PROVENTIL HFA, VENTOLIN HFA, PROAIR HFA) 90 mcg/actuation inhaler; 2 puffs q4h prn sob    2. Mixed anxiety and depressive disorder  Increase cymbalta to 60 mg f/u in one month or sooner    Other orders  -     dicyclomine (BENTYL) 10 mg capsule; Take 1 Cap by mouth three (3) times daily. ICD-10-CM ICD-9-CM    1. Allergic rhinitis, unspecified seasonality, unspecified trigger  J30.9 477.9 albuterol (PROVENTIL HFA, VENTOLIN HFA, PROAIR HFA) 90 mcg/actuation inhaler   2. Mixed anxiety and depressive disorder  F41.8 300.4      Orders Placed This Encounter    dicyclomine (BENTYL) 10 mg capsule     Sig: Take 1 Cap by mouth three (3) times daily. Dispense:  90 Cap     Refill:  1    albuterol (PROVENTIL HFA, VENTOLIN HFA, PROAIR HFA) 90 mcg/actuation inhaler     Si puffs q4h prn sob     Dispense:  1 Inhaler     Refill:  2        PLAN       TIME 12 (Minutes) SPENT RELATED TO THIS PHONE ENCOUNTER    We discussed the expected course, resolution and complications of the diagnosis(es) in detail. Medication risks, benefits, costs, interactions, and alternatives were discussed as indicated. I advised her to contact the office if her condition worsens, changes or fails to improve as anticipated.  She expressed understanding with the diagnosis(es) and plan

## 2021-02-18 ENCOUNTER — TELEPHONE (OUTPATIENT)
Dept: FAMILY MEDICINE CLINIC | Age: 46
End: 2021-02-18

## 2021-02-18 DIAGNOSIS — M25.519 ACUTE SHOULDER PAIN, UNSPECIFIED LATERALITY: Primary | ICD-10-CM

## 2021-02-18 DIAGNOSIS — M54.2 CERVICAL PAIN: Primary | ICD-10-CM

## 2021-02-18 RX ORDER — TRAMADOL HYDROCHLORIDE 50 MG/1
50 TABLET ORAL
Qty: 30 TAB | Refills: 0 | OUTPATIENT
Start: 2021-02-18 | End: 2021-02-23
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
## 2021-02-23 ENCOUNTER — APPOINTMENT (OUTPATIENT)
Dept: GENERAL RADIOLOGY | Age: 46
End: 2021-02-23
Attending: FAMILY MEDICINE
Payer: MEDICAID

## 2021-02-23 ENCOUNTER — OFFICE VISIT (OUTPATIENT)
Dept: ORTHOPEDIC SURGERY | Age: 46
End: 2021-02-23
Payer: MEDICAID

## 2021-02-23 ENCOUNTER — HOSPITAL ENCOUNTER (EMERGENCY)
Age: 46
Discharge: HOME | End: 2021-02-23
Attending: FAMILY MEDICINE
Payer: MEDICAID

## 2021-02-23 VITALS
HEART RATE: 73 BPM | RESPIRATION RATE: 18 BRPM | OXYGEN SATURATION: 98 % | TEMPERATURE: 98.3 F | SYSTOLIC BLOOD PRESSURE: 169 MMHG | DIASTOLIC BLOOD PRESSURE: 118 MMHG

## 2021-02-23 VITALS
BODY MASS INDEX: 36.07 KG/M2 | HEART RATE: 81 BPM | TEMPERATURE: 98.4 F | SYSTOLIC BLOOD PRESSURE: 152 MMHG | OXYGEN SATURATION: 96 % | HEIGHT: 62 IN | WEIGHT: 196 LBS | RESPIRATION RATE: 17 BRPM | DIASTOLIC BLOOD PRESSURE: 95 MMHG

## 2021-02-23 DIAGNOSIS — M54.6 THORACIC SPINE PAIN: ICD-10-CM

## 2021-02-23 DIAGNOSIS — R10.84 ABDOMINAL PAIN, GENERALIZED: ICD-10-CM

## 2021-02-23 DIAGNOSIS — S16.1XXA STRAIN OF NECK MUSCLE, INITIAL ENCOUNTER: ICD-10-CM

## 2021-02-23 DIAGNOSIS — M79.7 FIBROMYALGIA: ICD-10-CM

## 2021-02-23 DIAGNOSIS — M47.812 CERVICAL SPONDYLOSIS WITHOUT MYELOPATHY: ICD-10-CM

## 2021-02-23 DIAGNOSIS — M54.31 SCIATICA OF RIGHT SIDE: ICD-10-CM

## 2021-02-23 DIAGNOSIS — L40.50 PSORIATIC ARTHRITIS (HCC): ICD-10-CM

## 2021-02-23 DIAGNOSIS — M25.519 ACUTE SHOULDER PAIN, UNSPECIFIED LATERALITY: Primary | ICD-10-CM

## 2021-02-23 DIAGNOSIS — S29.019A THORACIC MYOFASCIAL STRAIN, INITIAL ENCOUNTER: ICD-10-CM

## 2021-02-23 DIAGNOSIS — M25.511 ACUTE PAIN OF RIGHT SHOULDER: Primary | ICD-10-CM

## 2021-02-23 DIAGNOSIS — M50.30 DDD (DEGENERATIVE DISC DISEASE), CERVICAL: ICD-10-CM
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
## 2021-02-23 DIAGNOSIS — M54.50 LOW BACK PAIN AT MULTIPLE SITES: Primary | ICD-10-CM

## 2021-02-23 DIAGNOSIS — M54.50 LUMBAR PAIN: ICD-10-CM

## 2021-02-23 DIAGNOSIS — M47.814 THORACIC SPONDYLOSIS WITHOUT MYELOPATHY: ICD-10-CM

## 2021-02-23 PROCEDURE — 73030 X-RAY EXAM OF SHOULDER: CPT

## 2021-02-23 PROCEDURE — 99204 OFFICE O/P NEW MOD 45 MIN: CPT | Performed by: PHYSICAL MEDICINE & REHABILITATION

## 2021-02-23 PROCEDURE — 74018 RADEX ABDOMEN 1 VIEW: CPT

## 2021-02-23 PROCEDURE — 99283 EMERGENCY DEPT VISIT LOW MDM: CPT

## 2021-02-23 PROCEDURE — 72070 X-RAY EXAM THORAC SPINE 2VWS: CPT | Performed by: PHYSICAL MEDICINE & REHABILITATION

## 2021-02-23 PROCEDURE — 72100 X-RAY EXAM L-S SPINE 2/3 VWS: CPT | Performed by: PHYSICAL MEDICINE & REHABILITATION

## 2021-02-23 PROCEDURE — 74011250637 HC RX REV CODE- 250/637: Performed by: FAMILY MEDICINE

## 2021-02-23 RX ORDER — CYCLOBENZAPRINE HCL 10 MG
TABLET ORAL
Qty: 30 TAB | Refills: 1 | Status: SHIPPED | OUTPATIENT
Start: 2021-02-23 | End: 2021-03-09 | Stop reason: SDUPTHER

## 2021-02-23 RX ORDER — METHOCARBAMOL 500 MG/1
500 TABLET, FILM COATED ORAL
Qty: 60 TAB | Refills: 0 | OUTPATIENT
Start: 2021-02-23 | End: 2021-02-23

## 2021-02-23 RX ORDER — KETOROLAC TROMETHAMINE 10 MG/1
10 TABLET, FILM COATED ORAL
Qty: 20 TAB | Refills: 0 | Status: SHIPPED | OUTPATIENT
Start: 2021-02-23 | End: 2021-03-09

## 2021-02-23 RX ORDER — KETOROLAC TROMETHAMINE 10 MG/1
10 TABLET, FILM COATED ORAL ONCE
Status: COMPLETED | OUTPATIENT
Start: 2021-02-23 | End: 2021-02-23

## 2021-02-23 RX ADMIN — KETOROLAC TROMETHAMINE 10 MG: 10 TABLET, FILM COATED ORAL at 17:13
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
## 2021-02-23 NOTE — LETTER
2/23/2021 Patient: Michelle Burden YOB: 1975 Date of Visit: 2/23/2021 Jeff Gallegos NP 
[place] [id number] [address] [address] [address] [employer] Dear Jeff Gallegos NP, Thank you for referring Ms. Michelle Burden to [address] for evaluation. My notes for this consultation are attached. If you have questions, please do not hesitate to call me. I look forward to following your patient along with you. Sincerely, Romana Contreras MD

## 2021-03-01 ENCOUNTER — TELEPHONE (OUTPATIENT)
Dept: ORTHOPEDIC SURGERY | Age: 46
End: 2021-03-01

## 2021-03-01 NOTE — TELEPHONE ENCOUNTER
Patient's  says [facility] has not received our order for physical therapy. Can we please resend.       Ana Veras [phone]

## 2021-03-01 NOTE — TELEPHONE ENCOUNTER
Spoke with patient and the order was faxed to the [address] [place] [address] location at [phone]

## 2021-03-03 ENCOUNTER — HOSPITAL ENCOUNTER (OUTPATIENT)
Dept: PHYSICAL THERAPY | Age: 46
Discharge: HOME | End: 2021-03-03
Payer: MEDICAID

## 2021-03-03 PROCEDURE — 97161 PT EVAL LOW COMPLEX 20 MIN: CPT

## 2021-03-03 PROCEDURE — 97162 PT EVAL MOD COMPLEX 30 MIN: CPT
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
## 2021-03-08 ENCOUNTER — OFFICE VISIT (OUTPATIENT)
Dept: ORTHOPEDIC SURGERY | Age: 46
End: 2021-03-08
Payer: MEDICAID

## 2021-03-08 VITALS
HEART RATE: 95 BPM | OXYGEN SATURATION: 99 % | RESPIRATION RATE: 18 BRPM | DIASTOLIC BLOOD PRESSURE: 107 MMHG | SYSTOLIC BLOOD PRESSURE: 152 MMHG | TEMPERATURE: 97.5 F | WEIGHT: 192 LBS | HEIGHT: 61 IN | BODY MASS INDEX: 36.25 KG/M2

## 2021-03-08 DIAGNOSIS — S46.011A TRAUMATIC TEAR OF RIGHT ROTATOR CUFF, UNSPECIFIED TEAR EXTENT, INITIAL ENCOUNTER: Primary | ICD-10-CM

## 2021-03-08 PROCEDURE — 99203 OFFICE O/P NEW LOW 30 MIN: CPT | Performed by: ORTHOPAEDIC SURGERY
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
## 2021-03-09 ENCOUNTER — VIRTUAL VISIT (OUTPATIENT)
Dept: FAMILY MEDICINE CLINIC | Age: 46
End: 2021-03-09
Payer: MEDICAID

## 2021-03-09 ENCOUNTER — TELEPHONE (OUTPATIENT)
Dept: ORTHOPEDIC SURGERY | Age: 46
End: 2021-03-09

## 2021-03-09 DIAGNOSIS — S46.011A TRAUMATIC TEAR OF RIGHT ROTATOR CUFF, UNSPECIFIED TEAR EXTENT, INITIAL ENCOUNTER: ICD-10-CM
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
## 2021-03-09 DIAGNOSIS — G62.9 NEUROPATHY: ICD-10-CM

## 2021-03-09 DIAGNOSIS — G47.00 INSOMNIA, UNSPECIFIED TYPE: Primary | ICD-10-CM

## 2021-03-09 PROCEDURE — 99443 PR PHYS/QHP TELEPHONE EVALUATION 21-30 MIN: CPT | Performed by: NURSE PRACTITIONER

## 2021-03-09 RX ORDER — TIZANIDINE 2 MG/1
2 TABLET ORAL
Qty: 60 TAB | Refills: 0 | Status: SHIPPED | OUTPATIENT
Start: 2021-03-09 | End: 2021-04-12

## 2021-03-09 RX ORDER — PREGABALIN 100 MG/1
100 CAPSULE ORAL 2 TIMES DAILY
Qty: 60 CAP | Refills: 2 | Status: SHIPPED | OUTPATIENT
Start: 2021-03-09 | End: 2021-04-12 | Stop reason: SDUPTHER

## 2021-03-09 RX ORDER — TRAZODONE HYDROCHLORIDE 50 MG/1
50 TABLET ORAL
Qty: 30 TAB | Refills: 1 | Status: SHIPPED | OUTPATIENT
Start: 2021-03-09 | End: 2021-08-31
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
## 2021-03-09 NOTE — TELEPHONE ENCOUNTER
Patient called stating when she saw Dr. Alvin Cohen on 02/23/21 he prescribed her the medication methocarbamoL (ROBAXIN) 500 mg tablet. She says it puts her in a bad mood - as her  says, it makes her \"Cujo-ey\" - and when she saw her PCP this morning she tried to get a prescription for another kind of muscle relaxer. Her PCP was unable to prescribe her anything since she's now under Dr. Naty dumas so she was told to call us and request for this medication to be changed to something else. She confirmed [facility] is [facility] [address] in [place] and she's requesting a call back when completed at [phone].

## 2021-03-09 NOTE — TELEPHONE ENCOUNTER
Has she taken Skelaxin or Flexeril with relief before (I see she was recently rx flexeril in [address] [place] [address])

## 2021-03-09 NOTE — TELEPHONE ENCOUNTER
Patient states that she tried flexeril before and it did not work. Patient states that she does not think she has ever had skelaxin. Please advise.
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
## 2021-03-10 ENCOUNTER — TELEPHONE (OUTPATIENT)
Dept: ORTHOPEDIC SURGERY | Age: 46
End: 2021-03-10

## 2021-03-10 ENCOUNTER — HOSPITAL ENCOUNTER (OUTPATIENT)
Dept: MRI IMAGING | Age: 46
Discharge: HOME | End: 2021-03-10
Payer: MEDICAID

## 2021-03-10 PROCEDURE — 73221 MRI JOINT UPR EXTREM W/O DYE: CPT

## 2021-03-10 NOTE — TELEPHONE ENCOUNTER
called stating the patient is in so much pain she is laying there in tears. He is at a loss for what to do for her and is requesting advice. Also, he states the [facility] in [place] does not have the [employer] [facility] [place] [facility] [employer]. Can we please resend. [phone] Mr. Becky Barraza

## 2021-03-10 NOTE — TELEPHONE ENCOUNTER
Per CC, patient was seen yesterday and started on Lyrica by PCP. Give this time to work. Patient was also prescribed a new muscle relaxer yesterday. Use heat and ice PRN.  She may want to talk to PCP about a PM referral. We can also offer a sooner FU with Dr. Chucky Yanez if she would like

## 2021-03-12 NOTE — TELEPHONE ENCOUNTER
Patient states she has not started the Lyrica at this time, she will be starting it in a few days. I advised her to use ice or heat prn and to maybe ask her PCP for a PM referral. Patient verbalized understanding.

## 2021-03-22 ENCOUNTER — HOSPITAL ENCOUNTER (OUTPATIENT)
Dept: PHYSICAL THERAPY | Age: 46
Discharge: HOME | End: 2021-03-22
Payer: MEDICAID

## 2021-03-22 PROCEDURE — 97110 THERAPEUTIC EXERCISES: CPT

## 2021-03-22 PROCEDURE — 97530 THERAPEUTIC ACTIVITIES: CPT

## 2021-03-22 NOTE — PROGRESS NOTES
PT DAILY TREATMENT NOTE 8-14    Patient Name: Carina Hernandez  Date:3/22/2021  : 1975  [x]  Patient  Verified  Payor: Wai Cline / Plan: Grace Gomez / Product Type: Managed Care Medicaid /    In time:1442  Out time:1529  Total Treatment Time (min): 47  Total Timed Codes (min): 37  1:1 Treatment Time (min): 37   Visit #: 2    Treatment Area: Low back pain [M54.5]  Pain in thoracic spine [M54.6]    SUBJECTIVE  Pt reported pain cervical, lumbar, and R wrist pain that prevents completion of everyday tasks and activities. Pain Level (0-10 scale): 8    Any medication changes, allergies to medications, adverse drug reactions, diagnosis change, or new procedure performed?: [x] No    [] Yes (see summary sheet for update)    OBJECTIVE  Modality rationale: To perform daily exercises with improved mobility and decreased pain. Min Type Additional Details    [] Estim: []Att   []Unatt  []TENS instruct                 []IFC  []Premod []NMES                       []Other:  []w/US   []w/ice   []w/heat  Position:  Location:    []  Traction: [] Cervical       []Lumbar                       [] Prone          []Supine                       []Intermittent   []Continuous Lbs:  [] before manual  [] after manual    []  Ultrasound: []Continuous   [] Pulsed                           []1MHz   []3MHz Location:  W/cm2:   10 []  Ice     [x]  heat  []  Ice massage Position: seated  Location: Lumbar    []  Vasopneumatic Device Pressure: [] lo [] med [] hi   Temp: [] lo [] med [] hi   [] Skin assessment post-treatment:  []intact []redness- no adverse reaction       []redness - adverse reaction:     20 min Therapeutic Exercise:  [x] See flow sheet :   Rationale: increase ROM, increase strength and improve coordination to improve the patients ability to return to baseline mobility and function.     13 min Therapeutic Activity:  [x]  See flow sheet :   Rationale: Practicing transitioning from supine <-> sit <-> sidelying without provocation of pain. With TE Patient Education: [x] Review HEP    [] Progressed/Changed HEP based on:   [] positioning   [] body mechanics   [] transfers   [] heat/ice application        Pain Level (0-10 scale) post treatment: 8    ASSESSMENT/Changes in Function: Session began with MHP. Initiated POC working to improve lumbar AROM and to decrease pain. Session consisted of lumbar and thoracic stretches with demonstration and tactile cues provided as needed. Most difficulty experienced with PPT due to pain. Pt reported relief in lower back upon completion of therapy session, but stated R side of body is still \"very much in pain. \"    Patient will continue to benefit from skilled PT services to modify and progress therapeutic interventions, address functional mobility deficits, address ROM deficits, address strength deficits, analyze and address soft tissue restrictions, analyze and cue movement patterns, analyze and modify body mechanics/ergonomics and assess and modify postural abnormalities to attain remaining goals.      [x]  See Plan of Care  []  See progress note/recertification  []  See Discharge Summary         PLAN  []  Upgrade activities as tolerated     [x]  Continue plan of care  []  Update interventions per flow sheet       []  Discharge due to:_  []  Other:_      SAGE Zamudio  3/22/2021  3:40 PM

## 2021-03-24 ENCOUNTER — APPOINTMENT (OUTPATIENT)
Dept: PHYSICAL THERAPY | Age: 46
End: 2021-03-24
Payer: MEDICAID

## 2021-03-24 NOTE — TELEPHONE ENCOUNTER
Patient states she has been taking the Lyrica and this is just not helping. She has fibromyalgia, psoriatic arthritis, asthma, and muscle spasms in the back are not settling down. She thinks the Lyrica has helped to some degree but it is not taking the spasms away. She is requesting a different muscle relaxer.      [employer]  Patient [phone]

## 2021-03-25 NOTE — TELEPHONE ENCOUNTER
She will need to be seen prior to any medication adjustments.  Please offer sooner FU with Dr. Ana Gramajo if there is an opening

## 2021-03-29 ENCOUNTER — OFFICE VISIT (OUTPATIENT)
Dept: ORTHOPEDIC SURGERY | Age: 46
End: 2021-03-29
Payer: MEDICAID

## 2021-03-29 VITALS
DIASTOLIC BLOOD PRESSURE: 87 MMHG | SYSTOLIC BLOOD PRESSURE: 150 MMHG | BODY MASS INDEX: 36.63 KG/M2 | OXYGEN SATURATION: 96 % | HEART RATE: 86 BPM | TEMPERATURE: 97.9 F | HEIGHT: 61 IN | RESPIRATION RATE: 18 BRPM | WEIGHT: 194 LBS

## 2021-03-29 DIAGNOSIS — S16.1XXA STRAIN OF NECK MUSCLE, INITIAL ENCOUNTER: ICD-10-CM

## 2021-03-29 DIAGNOSIS — M47.812 CERVICAL SPONDYLOSIS WITHOUT MYELOPATHY: ICD-10-CM

## 2021-03-29 DIAGNOSIS — M79.7 FIBROMYALGIA: ICD-10-CM

## 2021-03-29 DIAGNOSIS — M54.50 LUMBAR PAIN: ICD-10-CM

## 2021-03-29 DIAGNOSIS — S29.019A THORACIC MYOFASCIAL STRAIN, INITIAL ENCOUNTER: ICD-10-CM

## 2021-03-29 DIAGNOSIS — M54.6 THORACIC SPINE PAIN: ICD-10-CM

## 2021-03-29 DIAGNOSIS — M47.814 THORACIC SPONDYLOSIS WITHOUT MYELOPATHY: ICD-10-CM

## 2021-03-29 DIAGNOSIS — L40.50 PSORIATIC ARTHRITIS (HCC): ICD-10-CM

## 2021-03-29 DIAGNOSIS — M54.50 LOW BACK PAIN AT MULTIPLE SITES: Primary | ICD-10-CM

## 2021-03-29 DIAGNOSIS — M50.30 DDD (DEGENERATIVE DISC DISEASE), CERVICAL: ICD-10-CM

## 2021-03-29 PROCEDURE — 99213 OFFICE O/P EST LOW 20 MIN: CPT | Performed by: PHYSICAL MEDICINE & REHABILITATION

## 2021-03-29 RX ORDER — PREDNISONE 5 MG/1
5 TABLET ORAL DAILY
COMMUNITY
Start: 2021-03-22 | End: 2021-04-27 | Stop reason: ALTCHOICE
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
## 2021-03-29 NOTE — LETTER
3/29/2021 Patient: Nathan Campbell YOB: 1975 Date of Visit: 3/29/2021 Marylee Starks, NP 
[facility]  [address] [address] [address] [employer] Dear Marylee Starks, NP, Thank you for referring Ms. Melanie Hills to [address] for evaluation. My notes for this consultation are attached. If you have questions, please do not hesitate to call me. I look forward to following your patient along with you. Sincerely, Josh Hart MD

## 2021-03-30 ENCOUNTER — TELEPHONE (OUTPATIENT)
Dept: ORTHOPEDIC SURGERY | Age: 46
End: 2021-03-30

## 2021-03-30 NOTE — TELEPHONE ENCOUNTER
Patient called stating central scheduling informed her \"[facility] no longer does EMGs\", and she needs her EMG order sent to [facility] for scheduling. Please advise patient at [phone].

## 2021-03-31 ENCOUNTER — HOSPITAL ENCOUNTER (OUTPATIENT)
Dept: PHYSICAL THERAPY | Age: 46
Discharge: HOME | End: 2021-03-31
Payer: MEDICAID

## 2021-03-31 PROCEDURE — 97110 THERAPEUTIC EXERCISES: CPT

## 2021-03-31 NOTE — PROGRESS NOTES
PT DAILY TREATMENT NOTE 8-14    Patient Name: Lenora Gunter  Date:3/31/2021  : 1975  [x]  Patient  Verified  Payor: Virginie Pandya / Plan: Grace Gomez / Product Type: Managed Care Medicaid /    In time:1300  Out time:1333  Total Treatment Time (min): 33  Total Timed Codes (min): 23  1:1 Treatment Time (min): 23   Visit #: 3     Treatment Area: Low back pain [M54.5]  Pain in thoracic spine [M54.6]    SUBJECTIVE  \"My pain is really bad today, Its mostly in my rib area. \"    Pain Level (0-10 scale): 10/10    Any medication changes, allergies to medications, adverse drug reactions, diagnosis change, or new procedure performed?: [x] No    [] Yes (see summary sheet for update)        OBJECTIVE  Modality rationale: decrease pain and increase tissue extensibility to improve the patients ability to tolerate today's session.    Min Type Additional Details    [] Estim: []Att   []Unatt  []TENS instruct                 []IFC  []Premod []NMES                       []Other:  []w/US   []w/ice   []w/heat  Position:  Location:    []  Traction: [] Cervical       []Lumbar                       [] Prone          []Supine                       []Intermittent   []Continuous Lbs:  [] before manual  [] after manual    []  Ultrasound: []Continuous   [] Pulsed                           []1MHz   []3MHz Location:  W/cm2:   10 []  Ice     [x]  heat  []  Ice massage Position:Seated  Location: Lumbar    []  Vasopneumatic Device Pressure: [] lo [] med [] hi   Temp: [] lo [] med [] hi   [] Skin assessment post-treatment:  []intact []redness- no adverse reaction       []redness - adverse reaction:       23 min Therapeutic Exercise:  [x] See flow sheet :   Rationale: increase ROM and increase strength to improve the patients ability to return to prior level of function before injury/illness with reduced pain, achieving optimal strength and function to perform household tasks, daily activities, and return to community events, and/or work. With TE Patient Education: [x] Review HEP    [] Progressed/Changed HEP based on:   [] positioning   [] body mechanics   [x] transfers   [] heat/ice application        Patient's response to today's treatment: Patient performed her exercise as instructed and completed exercise. No change with pain level. Pain Level (0-10 scale) post treatment: 10/10    ASSESSMENT/Changes in Function: Began session with asking patient if her pain is elevated 10/10 she needs to go for medical attention either to her referring doctor or to the ED. Patient refused. Session began with MHP. Initiated POC working to improve lumbar AROM and to decrease pain. Session consisted of lumbar and thoracic stretches with demonstration and tactile cues provided as needed. Most difficulty experienced with PPT due to pain. Pt reported no change in relation to her pain upon completion of therapy session. Patient education of position change supine to sit and sit to supine in shich patient reports \"my shoulders are sore and I can't do it that way. \"    Patient will continue to benefit from skilled PT services to modify and progress therapeutic interventions, address functional mobility deficits, address ROM deficits, address strength deficits, analyze and address soft tissue restrictions, analyze and cue movement patterns, analyze and modify body mechanics/ergonomics and assess and modify postural abnormalities to attain remaining goals.      [x]  See Plan of Care  []  See progress note/recertification  []  See Discharge Summary           PLAN  []  Upgrade activities as tolerated     [x]  Continue plan of care  []  Update interventions per flow sheet       []  Discharge due to:_  []  Other:_      Angel James SAGE 3/31/2021  1:42 PM

## 2021-04-02 ENCOUNTER — APPOINTMENT (OUTPATIENT)
Dept: PHYSICAL THERAPY | Age: 46
End: 2021-04-02

## 2021-04-06 NOTE — TELEPHONE ENCOUNTER
EMG RUE is scheduled with Dr. Deanne Morelos, [address] [place] [address], [address], [phone] on 04/29/21, arrive 10:30AM, test 11:00AM.     Patient reminded to obtain MRI disk and bring to follow-up appointment. Patient expressed her understanding.

## 2021-04-12 ENCOUNTER — VIRTUAL VISIT (OUTPATIENT)
Dept: FAMILY MEDICINE CLINIC | Age: 46
End: 2021-04-12
Payer: MEDICAID

## 2021-04-12 DIAGNOSIS — G62.9 NEUROPATHY: ICD-10-CM

## 2021-04-12 DIAGNOSIS — M79.7 FIBROMYALGIA: Primary | ICD-10-CM

## 2021-04-12 PROCEDURE — 99212 OFFICE O/P EST SF 10 MIN: CPT | Performed by: NURSE PRACTITIONER

## 2021-04-12 RX ORDER — PREGABALIN 100 MG/1
100 CAPSULE ORAL 2 TIMES DAILY
Qty: 60 CAP | Refills: 2 | Status: SHIPPED | OUTPATIENT
Start: 2021-04-12 | End: 2021-08-03

## 2021-04-12 RX ORDER — GABAPENTIN 300 MG/1
CAPSULE ORAL
Qty: 90 CAP | Refills: 1 | Status: SHIPPED | OUTPATIENT
Start: 2021-04-12 | End: 2021-06-23 | Stop reason: SDUPTHER

## 2021-04-12 NOTE — PROGRESS NOTES
Rebecca Bates presents today for   Chief Complaint   Patient presents with    Follow-up         Depression Screening:  3 most recent PHQ Screens 4/12/2021   PHQ Not Done -   Little interest or pleasure in doing things More than half the days   Feeling down, depressed, irritable, or hopeless Nearly every day   Total Score PHQ 2 5   Trouble falling or staying asleep, or sleeping too much Not at all   Feeling tired or having little energy Nearly every day   Poor appetite, weight loss, or overeating Nearly every day   Feeling bad about yourself - or that you are a failure or have let yourself or your family down Nearly every day   Trouble concentrating on things such as school, work, reading, or watching TV Not at all   Moving or speaking so slowly that other people could have noticed; or the opposite being so fidgety that others notice More than half the days   Thoughts of being better off dead, or hurting yourself in some way Not at all   PHQ 9 Score 16   How difficult have these problems made it for you to do your work, take care of your home and get along with others Very difficult       Learning Assessment:  Learning Assessment 9/25/2020   PRIMARY LEARNER Patient   HIGHEST LEVEL OF EDUCATION - PRIMARY LEARNER  SOME COLLEGE   BARRIERS PRIMARY LEARNER NONE   CO-LEARNER CAREGIVER No   PRIMARY LANGUAGE ENGLISH   LEARNER PREFERENCE PRIMARY LISTENING   ANSWERED BY patient   RELATIONSHIP SELF       Fall Risk  Fall Risk Assessment, last 12 mths 3/8/2021   Able to walk? Yes   Fall in past 12 months? 0       Health Maintenance reviewed and discussed and ordered per Provider. Health Maintenance Due   Topic Date Due    Hepatitis C Screening  Never done    COVID-19 Vaccine (1) Never done    DTaP/Tdap/Td series (1 - Tdap) Never done    PAP AKA CERVICAL CYTOLOGY  Never done   . Coordination of Care:  1. Have you been to the ER, urgent care clinic since your last visit? Hospitalized since your last visit? No    2. Have you seen or consulted any other health care providers outside of the [address] [place] [address] since your last visit? Include any pap smears or colon screening.  No

## 2021-04-13 ENCOUNTER — OFFICE VISIT (OUTPATIENT)
Dept: ORTHOPEDIC SURGERY | Age: 46
End: 2021-04-13
Payer: MEDICAID

## 2021-04-13 VITALS
HEIGHT: 61 IN | WEIGHT: 192 LBS | OXYGEN SATURATION: 99 % | BODY MASS INDEX: 36.25 KG/M2 | HEART RATE: 76 BPM | RESPIRATION RATE: 16 BRPM

## 2021-04-13 DIAGNOSIS — S46.011D TRAUMATIC TEAR OF RIGHT ROTATOR CUFF, UNSPECIFIED TEAR EXTENT, SUBSEQUENT ENCOUNTER: Primary | ICD-10-CM

## 2021-04-13 DIAGNOSIS — M25.511 BILATERAL SHOULDER PAIN, UNSPECIFIED CHRONICITY: ICD-10-CM

## 2021-04-13 DIAGNOSIS — M25.512 BILATERAL SHOULDER PAIN, UNSPECIFIED CHRONICITY: ICD-10-CM

## 2021-04-13 PROCEDURE — 99213 OFFICE O/P EST LOW 20 MIN: CPT | Performed by: ORTHOPAEDIC SURGERY

## 2021-04-13 RX ORDER — BACLOFEN 10 MG/1
10 TABLET ORAL 3 TIMES DAILY
Qty: 30 TAB | Refills: 0 | Status: SHIPPED | OUTPATIENT
Start: 2021-04-13 | End: 2021-05-14 | Stop reason: ALTCHOICE
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
## 2021-04-18 NOTE — PROGRESS NOTES
Pursuant to the emergency declaration under the [facility] waiver authority and the [employer] Act, this phone visit was conducted, with patient's consent, to reduce the patient's risk of exposure to COVID-19 and provide continuity of care for an established patient. She and/or health care decision maker is aware that that she may receive a bill for this telephone service, depending on her insurance coverage, and has provided verbal consent to proceed. This is a Patient Initiated Episode with an Established Patient who has not had a related appointment within my department in the past 7 days or scheduled within the next 24 hours. HISTORY OF PRESENTING ILLNESS      Dane Alvarado is a 54 y.o. female evaluated via telephone on 4/12/2021 due to COVID 19 restrictions. Patient unable to participate in Virtual Visit with synchronous audio/visual technology. Patient presents today via telephone to discuss medication regimen for her neuropathy fibromyalgia she states the Lyrica is helping \"some\". Patient request to add on low-dose gabapentin to regimen as she has done in the past and stated that that was effective. PCP Provider  Naty Apodaca NP  Past Medical History:   Diagnosis Date    Fibromyalgia     IBS (irritable bowel syndrome)     Psoriasis     Psoriatic arthritis ([facility] [address]       Past Surgical History:   Procedure Laterality Date    HX ORTHOPAEDIC      ankle    HX WISDOM TEETH EXTRACTION       Allergies   Allergen Reactions    Penicillins Hives      History reviewed. No pertinent family history. Current Outpatient Medications   Medication Sig    pregabalin (LYRICA) 100 mg capsule Take 1 Cap by mouth two (2) times a day.  Max Daily Amount: 200 mg.    gabapentin (NEURONTIN) 300 mg capsule 1 cap po tid    predniSONE (DELTASONE) 5 mg tablet Take 5 mg by mouth daily.  traZODone (DESYREL) 50 mg tablet Take 1 Tab by mouth nightly.  dicyclomine (BENTYL) 10 mg capsule Take 1 Cap by mouth three (3) times daily.  albuterol (PROVENTIL HFA, VENTOLIN HFA, PROAIR HFA) 90 mcg/actuation inhaler 2 puffs q4h prn sob    cyanocobalamin (VITAMIN B12) 1,000 mcg/mL injection 1 ml sc once daily x 7 days; then 1 ml sc once weekly x 4 wks; then 1 ml sc once monthly thereafter (Patient taking differently: 1,000 mcg by SubCUTAneous route every month. 1 ml sc once daily x 7 days; then 1 ml sc once weekly x 4 wks; then 1 ml sc once monthly thereafter)    ergocalciferol (ERGOCALCIFEROL) 1,250 mcg (50,000 unit) capsule Take 1 Cap by mouth every seven (7) days.  Insulin Syringe-Needle U-100 (BD Insulin Syringe Ultra-Fine) 0.5 mL 31 gauge x 5/16\" syrg Use as directed with vit b12 injections    acetaminophen (Tylenol Arthritis Pain) 650 mg TbER Take 650 mg by mouth every eight (8) hours.  baclofen (LIORESAL) 10 mg tablet Take 1 Tab by mouth three (3) times daily. No current facility-administered medications for this visit. There were no vitals filed for this visit.   Social History     Socioeconomic History    Marital status:      Spouse name: Not on file    Number of children: Not on file    Years of education: Not on file    Highest education level: Not on file   Occupational History    Not on file   Social Needs    Financial resource strain: Not on file    Food insecurity     Worry: Not on file     Inability: Not on file    Transportation needs     Medical: Not on file     Non-medical: Not on file   Tobacco Use    Smoking status: Never Smoker    Smokeless tobacco: Never Used   Substance and Sexual Activity    Alcohol use: Yes     Comment: social    Drug use: Never    Sexual activity: Not on file   Lifestyle    Physical activity     Days per week: Not on file     Minutes per session: Not on file    Stress: Not on file   Relationships    Social connections     Talks on phone: Not on file     Gets together: Not on file     Attends Mor[employer] service: Not on file     Active member of club or organization: Not on file     Attends meetings of clubs or organizations: Not on file     Relationship status: Not on file    Intimate partner violence     Fear of current or ex partner: Not on file     Emotionally abused: Not on file     Physically abused: Not on file     Forced sexual activity: Not on file   Other Topics Concern    Not on file   Social History Narrative    Not on file       MEDICATIONS     Current Outpatient Medications   Medication Sig    pregabalin (LYRICA) 100 mg capsule Take 1 Cap by mouth two (2) times a day. Max Daily Amount: 200 mg.    gabapentin (NEURONTIN) 300 mg capsule 1 cap po tid    predniSONE (DELTASONE) 5 mg tablet Take 5 mg by mouth daily.  traZODone (DESYREL) 50 mg tablet Take 1 Tab by mouth nightly.  dicyclomine (BENTYL) 10 mg capsule Take 1 Cap by mouth three (3) times daily.  albuterol (PROVENTIL HFA, VENTOLIN HFA, PROAIR HFA) 90 mcg/actuation inhaler 2 puffs q4h prn sob    cyanocobalamin (VITAMIN B12) 1,000 mcg/mL injection 1 ml sc once daily x 7 days; then 1 ml sc once weekly x 4 wks; then 1 ml sc once monthly thereafter (Patient taking differently: 1,000 mcg by SubCUTAneous route every month. 1 ml sc once daily x 7 days; then 1 ml sc once weekly x 4 wks; then 1 ml sc once monthly thereafter)    ergocalciferol (ERGOCALCIFEROL) 1,250 mcg (50,000 unit) capsule Take 1 Cap by mouth every seven (7) days.  Insulin Syringe-Needle U-100 (BD Insulin Syringe Ultra-Fine) 0.5 mL 31 gauge x 5/16\" syrg Use as directed with vit b12 injections    acetaminophen (Tylenol Arthritis Pain) 650 mg TbER Take 650 mg by mouth every eight (8) hours.  baclofen (LIORESAL) 10 mg tablet Take 1 Tab by mouth three (3) times daily. No current facility-administered medications for this visit.         I have reviewed the nurses notes, vitals, problem list, allergy list, medical history, family, social history and medications. REVIEW OF SYMPTOMS     General: Pt denies excessive weight gain or loss. Pt is able to conduct ADL's    Musculoskeletal: \"pain everywhere\"         PHYSICAL EXAM       Due to this being a telephone encounter a very limited exam was performed  Neurological: A&Ox3, no slurred speech, answering questions appropriately  Respiratory: Non labored, talking in complete sentences, no audible wheeze over the phone        ASSESSMENT        Diagnoses and all orders for this visit:    1. Fibromyalgia  -     gabapentin (NEURONTIN) 300 mg capsule; 1 cap po tid    2. Neuropathy  -     pregabalin (LYRICA) 100 mg capsule; Take 1 Cap by mouth two (2) times a day. Max Daily Amount: 200 mg.  -     gabapentin (NEURONTIN) 300 mg capsule; 1 cap po tid        ICD-10-CM ICD-9-CM    1. Fibromyalgia  M79.7 729.1 gabapentin (NEURONTIN) 300 mg capsule   2. Neuropathy  G62.9 355.9 pregabalin (LYRICA) 100 mg capsule      gabapentin (NEURONTIN) 300 mg capsule     Orders Placed This Encounter    pregabalin (LYRICA) 100 mg capsule     Sig: Take 1 Cap by mouth two (2) times a day. Max Daily Amount: 200 mg. Dispense:  60 Cap     Refill:  2    gabapentin (NEURONTIN) 300 mg capsule     Si cap po tid     Dispense:  90 Cap     Refill:  1        PLAN       TIME 15(Minutes) SPENT RELATED TO THIS PHONE ENCOUNTER    We discussed the expected course, resolution and complications of the diagnosis(es) in detail. Medication risks, benefits, costs, interactions, and alternatives were discussed as indicated. I advised her to contact the office if her condition worsens, changes or fails to improve as anticipated.  She expressed understanding with the diagnosis(es) and plan

## 2021-04-23 ENCOUNTER — HOSPITAL ENCOUNTER (OUTPATIENT)
Age: 46
Discharge: HOME | End: 2021-04-23
Attending: PHYSICAL MEDICINE & REHABILITATION
Payer: MEDICAID

## 2021-04-23 PROCEDURE — 72146 MRI CHEST SPINE W/O DYE: CPT

## 2021-04-27 ENCOUNTER — VIRTUAL VISIT (OUTPATIENT)
Dept: FAMILY MEDICINE CLINIC | Age: 46
End: 2021-04-27
Payer: MEDICAID

## 2021-04-27 DIAGNOSIS — J01.90 ACUTE SINUSITIS, RECURRENCE NOT SPECIFIED, UNSPECIFIED LOCATION: ICD-10-CM

## 2021-04-27 DIAGNOSIS — G62.9 NEUROPATHY: Primary | ICD-10-CM

## 2021-04-27 DIAGNOSIS — R23.2 HOT FLASHES: ICD-10-CM

## 2021-04-27 DIAGNOSIS — F41.8 MIXED ANXIETY AND DEPRESSIVE DISORDER: ICD-10-CM

## 2021-04-27 PROCEDURE — 99212 OFFICE O/P EST SF 10 MIN: CPT | Performed by: NURSE PRACTITIONER

## 2021-04-27 RX ORDER — ALUMINUM ZIRCONIUM OCTACHLOROHYDREX GLY 16 G/100G
GEL TOPICAL
Qty: 90 CAP | Refills: 1 | Status: SHIPPED | OUTPATIENT
Start: 2021-04-27 | End: 2021-11-02

## 2021-04-27 RX ORDER — DOXYCYCLINE 100 MG/1
100 CAPSULE ORAL 2 TIMES DAILY
Qty: 20 CAP | Refills: 0 | Status: SHIPPED | OUTPATIENT
Start: 2021-04-27 | End: 2021-05-07

## 2021-04-27 RX ORDER — VENLAFAXINE HYDROCHLORIDE 37.5 MG/1
CAPSULE, EXTENDED RELEASE ORAL
Qty: 60 CAP | Refills: 0 | Status: SHIPPED | OUTPATIENT
Start: 2021-04-27 | End: 2021-06-10 | Stop reason: DRUGHIGH

## 2021-04-27 NOTE — PROGRESS NOTES
Pursuant to the emergency declaration under the [facility] waiver authority and the [employer] Act, this phone visit was conducted, with patient's consent, to reduce the patient's risk of exposure to COVID-19 and provide continuity of care for an established patient. She and/or health care decision maker is aware that that she may receive a bill for this telephone service, depending on her insurance coverage, and has provided verbal consent to proceed. This is a Patient Initiated Episode with an Established Patient who has not had a related appointment within my department in the past 7 days or scheduled within the next 24 hours. HISTORY OF PRESENTING ILLNESS      Mika Alvarado is a 54 y.o. female evaluated via telephone on 4/27/2021 due to COVID 19 restrictions. Patient unable to participate in Virtual Visit with synchronous audio/visual technology. Patient presents for 1 month follow-up related to neuropathy patient states current medication regimen is working well. She wants to discuss her increasing anxiety and depression accompanied by intermittent hot flashes which have developed roughly 6 months ago. She denies suicidal or homicidal ideation. She states she continues to have normal menses. Patient goes on to add that she has nasal congestion sinus tenderness frontal headache x3 weeks she states she has tried \"all of her aroma oils and hot baths with no relief in signs or symptoms \". PCP Provider  Dayne Shah NP  Past Medical History:   Diagnosis Date    Fibromyalgia     IBS (irritable bowel syndrome)     Psoriasis     Psoriatic arthritis ([facility] [address]       Past Surgical History:   Procedure Laterality Date    HX ORTHOPAEDIC      ankle    HX WISDOM TEETH EXTRACTION       Allergies   Allergen Reactions    Penicillins Hives      History reviewed.  No pertinent family history. Current Outpatient Medications   Medication Sig    doxycycline (VIBRAMYCIN) 100 mg capsule Take 1 Cap by mouth two (2) times a day for 10 days.  venlafaxine-SR (EFFEXOR-XR) 37.5 mg capsule 1 tab po once daily x 14 days then increase to 2 tabs po once daily thereafter    Bifidobacterium Infantis (Align) 4 mg cap 1 cap po once daily    baclofen (LIORESAL) 10 mg tablet Take 1 Tab by mouth three (3) times daily.  pregabalin (LYRICA) 100 mg capsule Take 1 Cap by mouth two (2) times a day. Max Daily Amount: 200 mg.    gabapentin (NEURONTIN) 300 mg capsule 1 cap po tid    traZODone (DESYREL) 50 mg tablet Take 1 Tab by mouth nightly.  albuterol (PROVENTIL HFA, VENTOLIN HFA, PROAIR HFA) 90 mcg/actuation inhaler 2 puffs q4h prn sob    cyanocobalamin (VITAMIN B12) 1,000 mcg/mL injection 1 ml sc once daily x 7 days; then 1 ml sc once weekly x 4 wks; then 1 ml sc once monthly thereafter (Patient taking differently: 1,000 mcg by SubCUTAneous route every month. 1 ml sc once daily x 7 days; then 1 ml sc once weekly x 4 wks; then 1 ml sc once monthly thereafter)    ergocalciferol (ERGOCALCIFEROL) 1,250 mcg (50,000 unit) capsule Take 1 Cap by mouth every seven (7) days.  Insulin Syringe-Needle U-100 (BD Insulin Syringe Ultra-Fine) 0.5 mL 31 gauge x 5/16\" syrg Use as directed with vit b12 injections    acetaminophen (Tylenol Arthritis Pain) 650 mg TbER Take 650 mg by mouth every eight (8) hours. No current facility-administered medications for this visit. There were no vitals filed for this visit.   Social History     Socioeconomic History    Marital status:      Spouse name: Not on file    Number of children: Not on file    Years of education: Not on file    Highest education level: Not on file   Occupational History    Not on file   Social Needs    Financial resource strain: Not on file    Food insecurity     Worry: Not on file     Inability: Not on file   [facility] Transportation needs     Medical: Not on file     Non-medical: Not on file   Tobacco Use    Smoking status: Never Smoker    Smokeless tobacco: Never Used   Substance and Sexual Activity    Alcohol use: Yes     Comment: social    Drug use: Never    Sexual activity: Not on file   Lifestyle    Physical activity     Days per week: Not on file     Minutes per session: Not on file    Stress: Not on file   Relationships    Social connections     Talks on phone: Not on file     Gets together: Not on file     Attends Gnosticism service: Not on file     Active member of club or organization: Not on file     Attends meetings of clubs or organizations: Not on file     Relationship status: Not on file    Intimate partner violence     Fear of current or ex partner: Not on file     Emotionally abused: Not on file     Physically abused: Not on file     Forced sexual activity: Not on file   Other Topics Concern    Not on file   Social History Narrative    Not on file       MEDICATIONS     Current Outpatient Medications   Medication Sig    doxycycline (VIBRAMYCIN) 100 mg capsule Take 1 Cap by mouth two (2) times a day for 10 days.  venlafaxine-SR (EFFEXOR-XR) 37.5 mg capsule 1 tab po once daily x 14 days then increase to 2 tabs po once daily thereafter    Bifidobacterium Infantis (Align) 4 mg cap 1 cap po once daily    baclofen (LIORESAL) 10 mg tablet Take 1 Tab by mouth three (3) times daily.  pregabalin (LYRICA) 100 mg capsule Take 1 Cap by mouth two (2) times a day. Max Daily Amount: 200 mg.    gabapentin (NEURONTIN) 300 mg capsule 1 cap po tid    traZODone (DESYREL) 50 mg tablet Take 1 Tab by mouth nightly.     albuterol (PROVENTIL HFA, VENTOLIN HFA, PROAIR HFA) 90 mcg/actuation inhaler 2 puffs q4h prn sob    cyanocobalamin (VITAMIN B12) 1,000 mcg/mL injection 1 ml sc once daily x 7 days; then 1 ml sc once weekly x 4 wks; then 1 ml sc once monthly thereafter (Patient taking differently: 1,000 mcg by SubCUTAneous route every month. 1 ml sc once daily x 7 days; then 1 ml sc once weekly x 4 wks; then 1 ml sc once monthly thereafter)    ergocalciferol (ERGOCALCIFEROL) 1,250 mcg (50,000 unit) capsule Take 1 Cap by mouth every seven (7) days.  Insulin Syringe-Needle U-100 (BD Insulin Syringe Ultra-Fine) 0.5 mL 31 gauge x 5/16\" syrg Use as directed with vit b12 injections    acetaminophen (Tylenol Arthritis Pain) 650 mg TbER Take 650 mg by mouth every eight (8) hours. No current facility-administered medications for this visit. I have reviewed the nurses notes, vitals, problem list, allergy list, medical history, family, social history and medications. REVIEW OF SYMPTOMS     General: Pt denies excessive weight gain or loss. Pt is able to conduct ADL's    Psychiatric: Denies confusion, insomnia, c/o anxiety/depression         PHYSICAL EXAM       Due to this being a telephone encounter a very limited exam was performed  Neurological: A&Ox3, no slurred speech, answering questions appropriately  Respiratory: Non labored, talking in complete sentences, no audible wheeze over the phone        ASSESSMENT        Diagnoses and all orders for this visit:    1. Neuropathy  The current medical regimen is effective;  continue present plan and medications. 2. Mixed anxiety and depressive disorder  Starting patient on Effexor for her complaint of mixed anxiety depression as well as for her hot flashes  3. Hot flashes    4. Acute sinusitis, recurrence not specified, unspecified location    Other orders  -     doxycycline (VIBRAMYCIN) 100 mg capsule; Take 1 Cap by mouth two (2) times a day for 10 days. -     venlafaxine-SR (EFFEXOR-XR) 37.5 mg capsule; 1 tab po once daily x 14 days then increase to 2 tabs po once daily thereafter  -     Bifidobacterium Infantis (Align) 4 mg cap; 1 cap po once daily        ICD-10-CM ICD-9-CM    1. Neuropathy  G62.9 355.9    2.  Mixed anxiety and depressive disorder  F41.8 300.4 3. Hot flashes  R23.2 782.62    4. Acute sinusitis, recurrence not specified, unspecified location  J01.90 461.9      Orders Placed This Encounter    doxycycline (VIBRAMYCIN) 100 mg capsule     Sig: Take 1 Cap by mouth two (2) times a day for 10 days. Dispense:  20 Cap     Refill:  0    venlafaxine-SR (EFFEXOR-XR) 37.5 mg capsule     Si tab po once daily x 14 days then increase to 2 tabs po once daily thereafter     Dispense:  60 Cap     Refill:  0    Bifidobacterium Infantis (Align) 4 mg cap     Si cap po once daily     Dispense:  90 Cap     Refill:  1        PLAN       TIME 18 (Minutes) SPENT RELATED TO THIS PHONE ENCOUNTER    We discussed the expected course, resolution and complications of the diagnosis(es) in detail. Medication risks, benefits, costs, interactions, and alternatives were discussed as indicated. I advised her to contact the office if her condition worsens, changes or fails to improve as anticipated.  She expressed understanding with the diagnosis(es) and plan

## 2021-04-27 NOTE — PROGRESS NOTES
Elizabet Rangel presents today for   Chief Complaint   Patient presents with    Follow-up    Depression         Depression Screening:  3 most recent PHQ Screens 4/27/2021   PHQ Not Done -   Little interest or pleasure in doing things Several days   Feeling down, depressed, irritable, or hopeless Nearly every day   Total Score PHQ 2 4   Trouble falling or staying asleep, or sleeping too much More than half the days   Feeling tired or having little energy Several days   Poor appetite, weight loss, or overeating Nearly every day   Feeling bad about yourself - or that you are a failure or have let yourself or your family down Nearly every day   Trouble concentrating on things such as school, work, reading, or watching TV More than half the days   Moving or speaking so slowly that other people could have noticed; or the opposite being so fidgety that others notice Not at all   Thoughts of being better off dead, or hurting yourself in some way Not at all   PHQ 9 Score 15   How difficult have these problems made it for you to do your work, take care of your home and get along with others Somewhat difficult       Learning Assessment:  Learning Assessment 9/25/2020   PRIMARY LEARNER Patient   HIGHEST LEVEL OF EDUCATION - PRIMARY LEARNER  SOME COLLEGE   BARRIERS PRIMARY LEARNER NONE   CO-LEARNER CAREGIVER No   PRIMARY LANGUAGE ENGLISH   LEARNER PREFERENCE PRIMARY LISTENING   ANSWERED BY patient   RELATIONSHIP SELF       Fall Risk  Fall Risk Assessment, last 12 mths 3/8/2021   Able to walk? Yes   Fall in past 12 months? 0       Health Maintenance reviewed and discussed and ordered per Provider. Health Maintenance Due   Topic Date Due    Hepatitis C Screening  Never done    COVID-19 Vaccine (1) Never done    DTaP/Tdap/Td series (1 - Tdap) Never done    PAP AKA CERVICAL CYTOLOGY  Never done   . Coordination of Care:  1. Have you been to the ER, urgent care clinic since your last visit?  Hospitalized since your last visit? No    2. Have you seen or consulted any other health care providers outside of the [address] [place] [address] since your last visit? Include any pap smears or colon screening.  No

## 2021-04-29 DIAGNOSIS — M47.812 CERVICAL SPONDYLOSIS WITHOUT MYELOPATHY: ICD-10-CM

## 2021-04-29 DIAGNOSIS — M54.6 THORACIC SPINE PAIN: ICD-10-CM

## 2021-04-29 DIAGNOSIS — M50.30 DDD (DEGENERATIVE DISC DISEASE), CERVICAL: ICD-10-CM

## 2021-04-29 DIAGNOSIS — M79.7 FIBROMYALGIA: ICD-10-CM

## 2021-04-29 DIAGNOSIS — L40.50 PSORIATIC ARTHRITIS (HCC): ICD-10-CM

## 2021-04-29 DIAGNOSIS — M47.814 THORACIC SPONDYLOSIS WITHOUT MYELOPATHY: ICD-10-CM

## 2021-04-29 DIAGNOSIS — S29.019A THORACIC MYOFASCIAL STRAIN, INITIAL ENCOUNTER: ICD-10-CM

## 2021-04-29 DIAGNOSIS — M54.50 LUMBAR PAIN: ICD-10-CM

## 2021-04-29 DIAGNOSIS — M54.50 LOW BACK PAIN AT MULTIPLE SITES: ICD-10-CM

## 2021-04-29 DIAGNOSIS — S16.1XXA STRAIN OF NECK MUSCLE, INITIAL ENCOUNTER: ICD-10-CM

## 2021-05-03 ENCOUNTER — HOSPITAL ENCOUNTER (OUTPATIENT)
Dept: PHYSICAL THERAPY | Age: 46
Discharge: HOME | End: 2021-05-03
Payer: MEDICAID

## 2021-05-03 PROCEDURE — 97161 PT EVAL LOW COMPLEX 20 MIN: CPT
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
## 2021-05-11 ENCOUNTER — APPOINTMENT (OUTPATIENT)
Dept: PHYSICAL THERAPY | Age: 46
End: 2021-05-11
Payer: MEDICAID
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
## 2021-05-13 ENCOUNTER — HOSPITAL ENCOUNTER (OUTPATIENT)
Dept: PHYSICAL THERAPY | Age: 46
Discharge: HOME | End: 2021-05-13
Payer: MEDICAID

## 2021-05-13 PROCEDURE — 97530 THERAPEUTIC ACTIVITIES: CPT

## 2021-05-13 PROCEDURE — 97110 THERAPEUTIC EXERCISES: CPT

## 2021-05-13 NOTE — PROGRESS NOTES
PT DAILY TREATMENT NOTE     Patient Name: Dilcia White  Date:2021  : 1975  [x]  Patient  Verified  Payor: [employer] / Plan: Grace 18 / Product Type: Managed Care Medicaid /    In time:1400  Out time:1442  Total Treatment Time (min): 42  Total Timed Codes (min): 42  1:1 Treatment Time (min): 42   Visit #: 2    Treatment Area: Pain in right shoulder [M25.511]    SUBJECTIVE  Pt enters clinic with complaints of cervical tightness and B shoulder pain L>R. Pain Level (0-10 scale): 6    Any medication changes, allergies to medications, adverse drug reactions, diagnosis change, or new procedure performed?: [x] No    [] Yes (see summary sheet for update)    OBJECTIVE  Modality rationale: Declined. Min Type Additional Details    [] Estim: []Att   []Unatt  []TENS instruct                 []IFC  []Premod []NMES                       []Other:  []w/US   []w/ice   []w/heat  Position:  Location:    []  Traction: [] Cervical       []Lumbar                       [] Prone          []Supine                       []Intermittent   []Continuous Lbs:  [] before manual  [] after manual    []  Ultrasound: []Continuous   [] Pulsed                           []1MHz   []3MHz Location:  W/cm2:    []  Ice     []  heat  []  Ice massage Position:  Location:    []  Vasopneumatic Device Pressure: [] lo [] med [] hi   Temp: [] lo [] med [] hi   [] Skin assessment post-treatment:  []intact []redness- no adverse reaction       []redness - adverse reaction:     18 min Therapeutic Exercise:  [x] See flow sheet :   Rationale: increase ROM and increase strength to improve the patients ability to complete household tasks pain free. 24 min Therapeutic Activity:  []  See flow sheet :   Rationale: To improve muscular conditioning and strength allow the patient's ability to properly transfer pain free.             With TE Patient Education: [x] Review HEP    [] Progressed/Changed HEP based on:   [] positioning   [] body mechanics   [] transfers   [] heat/ice application        Pain Level (0-10 scale) post treatment: 1    ASSESSMENT/Changes in Function: Initiated POC to improve L shoulder strength, AROM, and pain. Session began with cervical stretches and gentle periscapular strengthening. Pt then completed AROM to B shoulders in gravity eliminated position working to improve control and strength. Banded exercises followed in transverse plane to target RTC stability deficits. All modalities declined. Pt reported soreness in L shoulder upon completion, but patient had a decrease in pain. Patient will continue to benefit from skilled PT services to modify and progress therapeutic interventions, address functional mobility deficits, address ROM deficits, address strength deficits, analyze and cue movement patterns, analyze and modify body mechanics/ergonomics and assess and modify postural abnormalities to attain remaining goals.      [x]  See Plan of Care  []  See progress note/recertification  []  See Discharge Summary         PLAN  []  Upgrade activities as tolerated     [x]  Continue plan of care  []  Update interventions per flow sheet       []  Discharge due to:_  []  Other:_      SAGE Zamudio  5/13/2021  2:45 PM

## 2021-05-14 ENCOUNTER — TELEPHONE (OUTPATIENT)
Dept: FAMILY MEDICINE CLINIC | Age: 46
End: 2021-05-14

## 2021-05-14 ENCOUNTER — OFFICE VISIT (OUTPATIENT)
Dept: ORTHOPEDIC SURGERY | Age: 46
End: 2021-05-14
Payer: MEDICAID

## 2021-05-14 VITALS
SYSTOLIC BLOOD PRESSURE: 152 MMHG | BODY MASS INDEX: 37 KG/M2 | DIASTOLIC BLOOD PRESSURE: 96 MMHG | HEIGHT: 61 IN | HEART RATE: 82 BPM | TEMPERATURE: 98.3 F | WEIGHT: 196 LBS | RESPIRATION RATE: 17 BRPM | OXYGEN SATURATION: 98 %

## 2021-05-14 DIAGNOSIS — M54.6 THORACIC SPINE PAIN: ICD-10-CM

## 2021-05-14 DIAGNOSIS — M79.7 FIBROMYALGIA: ICD-10-CM

## 2021-05-14 DIAGNOSIS — L40.50 PSORIATIC ARTHRITIS (HCC): ICD-10-CM

## 2021-05-14 DIAGNOSIS — M47.812 CERVICAL SPONDYLOSIS WITHOUT MYELOPATHY: ICD-10-CM

## 2021-05-14 DIAGNOSIS — G56.01 CARPAL TUNNEL SYNDROME, RIGHT: ICD-10-CM

## 2021-05-14 DIAGNOSIS — M54.50 LOW BACK PAIN AT MULTIPLE SITES: Primary | ICD-10-CM

## 2021-05-14 DIAGNOSIS — M47.814 THORACIC SPONDYLOSIS WITHOUT MYELOPATHY: ICD-10-CM

## 2021-05-14 DIAGNOSIS — S29.019A THORACIC MYOFASCIAL STRAIN, INITIAL ENCOUNTER: ICD-10-CM

## 2021-05-14 DIAGNOSIS — M50.30 DDD (DEGENERATIVE DISC DISEASE), CERVICAL: ICD-10-CM

## 2021-05-14 DIAGNOSIS — M54.50 LUMBAR PAIN: ICD-10-CM

## 2021-05-14 DIAGNOSIS — M50.00 HNP (HERNIATED NUCLEUS PULPOSUS) WITH MYELOPATHY, CERVICAL: ICD-10-CM

## 2021-05-14 DIAGNOSIS — S16.1XXA STRAIN OF NECK MUSCLE, INITIAL ENCOUNTER: ICD-10-CM

## 2021-05-14 PROCEDURE — 99214 OFFICE O/P EST MOD 30 MIN: CPT | Performed by: PHYSICAL MEDICINE & REHABILITATION

## 2021-05-14 RX ORDER — HYDROCODONE BITARTRATE AND ACETAMINOPHEN 5; 325 MG/1; MG/1
TABLET ORAL
COMMUNITY
Start: 2021-02-16 | End: 2021-05-14 | Stop reason: ALTCHOICE

## 2021-05-14 RX ORDER — TOFACITINIB 11 MG/1
TABLET, FILM COATED, EXTENDED RELEASE ORAL
COMMUNITY
Start: 2021-05-11

## 2021-05-14 RX ORDER — METHOCARBAMOL 500 MG/1
TABLET, FILM COATED ORAL
COMMUNITY
Start: 2021-02-23 | End: 2021-05-14

## 2021-05-14 RX ORDER — PREDNISONE 5 MG/1
TABLET ORAL
COMMUNITY
Start: 2021-05-12 | End: 2021-08-31 | Stop reason: ALTCHOICE

## 2021-05-14 NOTE — LETTER
5/14/2021 Patient: Aniyah Verma YOB: 1975 Date of Visit: 5/14/2021 Len Haque NP 
[place]  [address] [address] [address] [employer] Dear Len Haque NP, Thank you for referring Ms. Abdoul Kelly to [address] for evaluation. My notes for this consultation are attached. If you have questions, please do not hesitate to call me. I look forward to following your patient along with you. Sincerely, Jennifer Renae MD

## 2021-05-14 NOTE — TELEPHONE ENCOUNTER
Patient's  called with a concern because the patient had an MRI Thorac Spine for Dr. Migdalia Wilson done and it picked up a lump on her thyroid. She has an appt with you on 5/27/21, but he wanted to know if there is anything that needs to be done in the meantime? The result is in her chart under Imaging.

## 2021-05-18 ENCOUNTER — HOSPITAL ENCOUNTER (OUTPATIENT)
Dept: PHYSICAL THERAPY | Age: 46
Discharge: HOME | End: 2021-05-18
Payer: MEDICAID

## 2021-05-18 PROCEDURE — 97110 THERAPEUTIC EXERCISES: CPT

## 2021-05-18 PROCEDURE — 97530 THERAPEUTIC ACTIVITIES: CPT

## 2021-05-21 ENCOUNTER — HOSPITAL ENCOUNTER (OUTPATIENT)
Dept: PHYSICAL THERAPY | Age: 46
Discharge: HOME | End: 2021-05-21
Payer: MEDICAID

## 2021-05-21 PROCEDURE — 97110 THERAPEUTIC EXERCISES: CPT

## 2021-05-21 NOTE — PROGRESS NOTES
PT DAILY TREATMENT NOTE 8-14    Patient Name: Dilcia White  Date:2021  : 1975  [x]  Patient  Verified  Payor: [employer] / Plan: Grace Gomez / Product Type: Managed Care Medicaid /    In time: 1024  Out time: 5701  Total Treatment Time (min): 39  Total Timed Codes (min): 29  1:1 Treatment Time (min): 29   Visit # 4      Treatment Area: Pain in right shoulder [M25.511]    SUBJECTIVE  Pt presents to PT ~10 minutes late for today's session. Pt complains of L thoracic back pain at start of today's visit. Pain Level (0-10 scale): 0/10  Any medication changes, allergies to medications, adverse drug reactions, diagnosis change, or new procedure performed?: [x] No    [] Yes (see summary sheet for update)        OBJECTIVE  Modality rationale: decrease pain and increase tissue extensibility to improve the patients ability to participate in physical therapy.    Min Type Additional Details    [] Estim: []Att   []Unatt  []TENS instruct                 []IFC  []Premod []NMES                       []Other:  []w/US   []w/ice   []w/heat  Position:  Location:    []  Traction: [] Cervical       []Lumbar                       [] Prone          []Supine                       []Intermittent   []Continuous Lbs:  [] before manual  [] after manual    []  Ultrasound: []Continuous   [] Pulsed                           []1MHz   []3MHz Location:  W/cm2:        10 []  Ice     [x]  heat  []  Ice massage Position: seated  Location: cervicothoracic    []  Vasopneumatic Device Pressure: [] lo [] med [] hi   Temp: [] lo [] med [] hi   [x] Skin assessment post-treatment:  [x]intact [x]redness- no adverse reaction       []redness - adverse reaction:       29 min Therapeutic Exercise:  [x] See flow sheet :   Rationale: increase ROM and increase strength to improve the patients ability to return to prior level of function before injury/illness with reduced pain, achieving optimal strength and function to perform household tasks, daily activities, and return to work if applicable. With TE Patient Education: [x] Review HEP    [] Progressed/Changed HEP based on:   [] positioning   [] body mechanics   [] transfers   [] heat/ice application          Pain Level (0-10 scale) post treatment: 0/10    ASSESSMENT/Changes in Function:   Today's Tx session began with MHP to increase tissue extensibility and decrease pain followed by exercises to increase pain free ROM and increase BUE strength. Pt progressed to shoulder shrugs with resistance at today's visit. Pt tolerated today's Tx well, with no complaints of pain. PT will continue to progress Pt as tolerated. Patient will continue to benefit from skilled PT services to modify and progress therapeutic interventions, address ROM deficits, address strength deficits, analyze and address soft tissue restrictions, analyze and cue movement patterns and assess and modify postural abnormalities to attain remaining goals.      [x]  See Plan of Care  []  See progress note/recertification  []  See Discharge Summary             PLAN  [x]  Upgrade activities as tolerated     [x]  Continue plan of care  []  Update interventions per flow sheet       []  Discharge due to:_  []  Other:_      Armando Trejo, PT, DPT 5/21/2021  10:32 AM

## 2021-05-27 ENCOUNTER — VIRTUAL VISIT (OUTPATIENT)
Dept: FAMILY MEDICINE CLINIC | Age: 46
End: 2021-05-27
Payer: MEDICAID

## 2021-05-27 DIAGNOSIS — E55.9 VITAMIN D DEFICIENCY: ICD-10-CM

## 2021-05-27 DIAGNOSIS — Z00.00 WELLNESS EXAMINATION: ICD-10-CM

## 2021-05-27 DIAGNOSIS — F41.8 MIXED ANXIETY AND DEPRESSIVE DISORDER: ICD-10-CM

## 2021-05-27 DIAGNOSIS — E53.8 VITAMIN B12 DEFICIENCY: ICD-10-CM

## 2021-05-27 DIAGNOSIS — N95.1 MENOPAUSAL HOT FLUSHES: ICD-10-CM

## 2021-05-27 DIAGNOSIS — G62.9 NEUROPATHY: Primary | ICD-10-CM

## 2021-05-27 DIAGNOSIS — E04.1 THYROID NODULE: ICD-10-CM

## 2021-05-27 PROCEDURE — 99213 OFFICE O/P EST LOW 20 MIN: CPT | Performed by: NURSE PRACTITIONER

## 2021-05-27 NOTE — PROGRESS NOTES
Pursuant to the emergency declaration under the [facility] waiver authority and the [email or web address] Act, this phone visit was conducted, with patient's consent, to reduce the patient's risk of exposure to COVID-19 and provide continuity of care for an established patient. She and/or health care decision maker is aware that that she may receive a bill for this telephone service, depending on her insurance coverage, and has provided verbal consent to proceed. This is a Patient Initiated Episode with an Established Patient who has not had a related appointment within my department in the past 7 days or scheduled within the next 24 hours. HISTORY OF PRESENTING ILLNESS      Luz Marina Cunningham is a 54 y.o. female evaluated via telephone on 5/27/2021 due to COVID 19 restrictions. Patient unable to participate in Virtual Visit with synchronous audio/visual technology. Patient presents today for 1 month follow-up after increasing Effexor for menopausal flushing as well as irritability. Patient states she has noted a marked improvement with her moods states hot flashes still remained about the same. She continues to deny homicidal suicidal ideation. Of note patient states she did not get labs conducted as previously instructed new order for labs placed today. PCP Provider  Fred Piña NP  Past Medical History:   Diagnosis Date    Fibromyalgia     IBS (irritable bowel syndrome)     Psoriasis     Psoriatic arthritis ([facility] [address]       Past Surgical History:   Procedure Laterality Date    HX ORTHOPAEDIC      ankle    HX WISDOM TEETH EXTRACTION       Allergies   Allergen Reactions    Penicillins Hives      History reviewed. No pertinent family history.    Current Outpatient Medications   Medication Sig    predniSONE (DELTASONE) 5 mg tablet     Xeljanz XR 11 mg Tb24     venlafaxine-SR (EFFEXOR-XR) 37.5 mg capsule 1 tab po once daily x 14 days then increase to 2 tabs po once daily thereafter (Patient taking differently: Take 75 mg by mouth daily. 1 tab po once daily x 14 days then increase to 2 tabs po once daily thereafter)    Bifidobacterium Infantis (Align) 4 mg cap 1 cap po once daily    pregabalin (LYRICA) 100 mg capsule Take 1 Cap by mouth two (2) times a day. Max Daily Amount: 200 mg.    gabapentin (NEURONTIN) 300 mg capsule 1 cap po tid    traZODone (DESYREL) 50 mg tablet Take 1 Tab by mouth nightly.  albuterol (PROVENTIL HFA, VENTOLIN HFA, PROAIR HFA) 90 mcg/actuation inhaler 2 puffs q4h prn sob    cyanocobalamin (VITAMIN B12) 1,000 mcg/mL injection 1 ml sc once daily x 7 days; then 1 ml sc once weekly x 4 wks; then 1 ml sc once monthly thereafter (Patient taking differently: 1,000 mcg by SubCUTAneous route every month. 1 ml sc once daily x 7 days; then 1 ml sc once weekly x 4 wks; then 1 ml sc once monthly thereafter)    ergocalciferol (ERGOCALCIFEROL) 1,250 mcg (50,000 unit) capsule Take 1 Cap by mouth every seven (7) days.  acetaminophen (Tylenol Arthritis Pain) 650 mg TbER Take 650 mg by mouth every eight (8) hours. No current facility-administered medications for this visit. There were no vitals filed for this visit.   Social History     Socioeconomic History    Marital status:      Spouse name: Not on file    Number of children: Not on file    Years of education: Not on file    Highest education level: Not on file   Occupational History    Not on file   Tobacco Use    Smoking status: Never Smoker    Smokeless tobacco: Never Used   Vaping Use    Vaping Use: Never used   Substance and Sexual Activity    Alcohol use: Yes     Comment: social    Drug use: Never    Sexual activity: Not on file   Other Topics Concern    Not on file   Social History Narrative    Not on file     Social Determinants of Health     Financial Resource Strain:     Difficulty of Paying Living Expenses:    Food Insecurity:     Worried About Running Out of Food in the Last Year:     [address] in the Last Year:    Transportation Needs:     Lack of Transportation (Medical):  Lack of Transportation (Non-Medical):    Physical Activity:     Days of Exercise per Week:     Minutes of Exercise per Session:    Stress:     Feeling of Stress :    Social Connections:     Frequency of Communication with Friends and Family:     Frequency of Social Gatherings with Friends and Family:     Attends Sabian[employer] Services:     Active Member of Clubs or Organizations:     Attends Club or Organization Meetings:     Marital Status:    Intimate Partner Violence:     Fear of Current or Ex-Partner:     Emotionally Abused:     Physically Abused:     Sexually Abused:        MEDICATIONS     Current Outpatient Medications   Medication Sig    predniSONE (DELTASONE) 5 mg tablet     Xeljanz XR 11 mg Tb24     venlafaxine-SR (EFFEXOR-XR) 37.5 mg capsule 1 tab po once daily x 14 days then increase to 2 tabs po once daily thereafter (Patient taking differently: Take 75 mg by mouth daily. 1 tab po once daily x 14 days then increase to 2 tabs po once daily thereafter)    Bifidobacterium Infantis (Align) 4 mg cap 1 cap po once daily    pregabalin (LYRICA) 100 mg capsule Take 1 Cap by mouth two (2) times a day. Max Daily Amount: 200 mg.    gabapentin (NEURONTIN) 300 mg capsule 1 cap po tid    traZODone (DESYREL) 50 mg tablet Take 1 Tab by mouth nightly.  albuterol (PROVENTIL HFA, VENTOLIN HFA, PROAIR HFA) 90 mcg/actuation inhaler 2 puffs q4h prn sob    cyanocobalamin (VITAMIN B12) 1,000 mcg/mL injection 1 ml sc once daily x 7 days; then 1 ml sc once weekly x 4 wks; then 1 ml sc once monthly thereafter (Patient taking differently: 1,000 mcg by SubCUTAneous route every month.  1 ml sc once daily x 7 days; then 1 ml sc once weekly x 4 wks; then 1 ml sc once monthly thereafter)  ergocalciferol (ERGOCALCIFEROL) 1,250 mcg (50,000 unit) capsule Take 1 Cap by mouth every seven (7) days.  acetaminophen (Tylenol Arthritis Pain) 650 mg TbER Take 650 mg by mouth every eight (8) hours. No current facility-administered medications for this visit. I have reviewed the nurses notes, vitals, problem list, allergy list, medical history, family, social history and medications. REVIEW OF SYMPTOMS     General: Pt denies excessive weight gain or loss. Pt is able to conduct ADL's         PHYSICAL EXAM       Due to this being a telephone encounter a very limited exam was performed  Neurological: A&Ox3, no slurred speech, answering questions appropriately  Respiratory: Non labored, talking in complete sentences, no audible wheeze over the phone        ASSESSMENT        Diagnoses and all orders for this visit:    1. Neuropathy    2. Mixed anxiety and depressive disorder  The current medical regimen is effective;  continue present plan and medications. Advised     If at any time you feel unsafe and may hurt yourself or others, either call '911' or go to the nearest emergency room. Contact the nearest Hospital Emergency Room in cases that result in a medical emergency. Contact a friend / family member to discuss what you are feeling and solicit support. 3. Vitamin D deficiency  -     VITAMIN D, 25 HYDROXY; Future    4. Vitamin B12 deficiency  -     VITAMIN B12; Future    5. Thyroid nodule  -     TSH 3RD GENERATION; Future  -     THYROID STIMULATING IMMUNOGLOBULIN  -     THYROID PEROXIDASE (TPO) AB  -     T4, FREE    6. Menopausal hot flushes  The current medical regimen is effective;  continue present plan and medications. 7. Wellness examination  -     HEMOGLOBIN A1C W/O EAG; Future  -     CBC W/O DIFF; Future  -     METABOLIC PANEL, COMPREHENSIVE; Future  -     LIPID PANEL; Future        ICD-10-CM ICD-9-CM    1. Neuropathy  G62.9 355.9    2.  Mixed anxiety and depressive disorder F41.8 300.4    3. Vitamin D deficiency  E55.9 268.9 VITAMIN D, 25 HYDROXY   4. Vitamin B12 deficiency  E53.8 266.2 VITAMIN B12   5. Thyroid nodule  E04.1 241.0 TSH 3RD GENERATION      THYROID STIMULATING IMMUNOGLOBULIN      THYROID PEROXIDASE (TPO) AB      T4, FREE   6. Menopausal hot flushes  N95.1 627.2    7. Wellness examination  Z00.00 V70.0 HEMOGLOBIN A1C W/O EAG      CBC W/O DIFF      METABOLIC PANEL, COMPREHENSIVE      LIPID PANEL     Orders Placed This Encounter    HEMOGLOBIN A1C W/O EAG     Standing Status:   Future     Standing Expiration Date:   5/28/2022    CBC W/O DIFF     Standing Status:   Future     Standing Expiration Date:   6/15/2637    METABOLIC PANEL, COMPREHENSIVE     Standing Status:   Future     Standing Expiration Date:   5/28/2022    LIPID PANEL     Standing Status:   Future     Standing Expiration Date:   5/28/2022    VITAMIN D, 25 HYDROXY     Standing Status:   Future     Standing Expiration Date:   5/28/2022    VITAMIN B12     Standing Status:   Future     Standing Expiration Date:   5/28/2022    TSH 3RD GENERATION     Standing Status:   Future     Standing Expiration Date:   5/28/2022    THYROID STIMULATING IMMUNOGLOBULIN    THYROID PEROXIDASE (TPO) AB    T4, FREE        PLAN       TIME 20 Minutes) SPENT RELATED TO THIS PHONE ENCOUNTER    We discussed the expected course, resolution and complications of the diagnosis(es) in detail. Medication risks, benefits, costs, interactions, and alternatives were discussed as indicated. I advised her to contact the office if her condition worsens, changes or fails to improve as anticipated.  She expressed understanding with the diagnosis(es) and plan

## 2021-05-27 NOTE — PROGRESS NOTES
Luz Marina Cunningham presents today for   Chief Complaint   Patient presents with    Follow-up         Depression Screening:  3 most recent PHQ Screens 5/27/2021   PHQ Not Done -   Little interest or pleasure in doing things Not at all   Feeling down, depressed, irritable, or hopeless Not at all   Total Score PHQ 2 0   Trouble falling or staying asleep, or sleeping too much -   Feeling tired or having little energy -   Poor appetite, weight loss, or overeating -   Feeling bad about yourself - or that you are a failure or have let yourself or your family down -   Trouble concentrating on things such as school, work, reading, or watching TV -   Moving or speaking so slowly that other people could have noticed; or the opposite being so fidgety that others notice -   Thoughts of being better off dead, or hurting yourself in some way -   PHQ 9 Score -   How difficult have these problems made it for you to do your work, take care of your home and get along with others -       Learning Assessment:  Learning Assessment 9/25/2020   PRIMARY LEARNER Patient   HIGHEST LEVEL OF EDUCATION - PRIMARY LEARNER  SOME COLLEGE   BARRIERS PRIMARY LEARNER NONE   CO-LEARNER CAREGIVER No   PRIMARY LANGUAGE ENGLISH   LEARNER PREFERENCE PRIMARY LISTENING   ANSWERED BY patient   RELATIONSHIP SELF       Fall Risk  Fall Risk Assessment, last 12 mths 3/8/2021   Able to walk? Yes   Fall in past 12 months? 0       Health Maintenance reviewed and discussed and ordered per Provider. Health Maintenance Due   Topic Date Due    Hepatitis C Screening  Never done    COVID-19 Vaccine (1) Never done    DTaP/Tdap/Td series (1 - Tdap) Never done    PAP AKA CERVICAL CYTOLOGY  Never done   . Coordination of Care:  1. Have you been to the ER, urgent care clinic since your last visit? Hospitalized since your last visit? No    2. Have you seen or consulted any other health care providers outside of the [address] [place] [address] since your last visit? Include any pap smears or colon screening.  No

## 2021-05-28 ENCOUNTER — OFFICE VISIT (OUTPATIENT)
Dept: ORTHOPEDIC SURGERY | Age: 46
End: 2021-05-28
Payer: MEDICAID

## 2021-05-28 VITALS
WEIGHT: 195 LBS | HEIGHT: 61 IN | TEMPERATURE: 97.7 F | OXYGEN SATURATION: 98 % | BODY MASS INDEX: 36.82 KG/M2 | HEART RATE: 89 BPM

## 2021-05-28 DIAGNOSIS — G56.01 RIGHT CARPAL TUNNEL SYNDROME: Primary | ICD-10-CM

## 2021-05-28 DIAGNOSIS — M25.531 RIGHT WRIST PAIN: ICD-10-CM

## 2021-05-28 PROCEDURE — 20526 THER INJECTION CARP TUNNEL: CPT | Performed by: ORTHOPAEDIC SURGERY

## 2021-05-28 PROCEDURE — 99214 OFFICE O/P EST MOD 30 MIN: CPT | Performed by: ORTHOPAEDIC SURGERY

## 2021-05-28 PROCEDURE — 73110 X-RAY EXAM OF WRIST: CPT | Performed by: ORTHOPAEDIC SURGERY
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
## 2021-06-08 ENCOUNTER — HOSPITAL ENCOUNTER (OUTPATIENT)
Dept: LAB | Age: 46
Discharge: HOME | End: 2021-06-08
Payer: MEDICAID

## 2021-06-08 LAB
ALBUMIN SERPL-MCNC: 4.2 G/DL (ref 3.5–4.7)
ALBUMIN/GLOB SERPL: 1.2 {RATIO}
ALP SERPL-CCNC: 50 U/L (ref 38–126)
ALT SERPL-CCNC: 18 U/L (ref 3–52)
ANION GAP SERPL CALC-SCNC: 9 MMOL/L
AST SERPL W P-5'-P-CCNC: 14 U/L (ref 14–74)
BILIRUB SERPL-MCNC: 0.3 MG/DL (ref 0.2–1)
BUN SERPL-MCNC: 14 MG/DL (ref 9–21)
BUN/CREAT SERPL: 18
CA-I BLD-MCNC: 9.5 MG/DL (ref 8.5–10.5)
CHLORIDE SERPL-SCNC: 106 MMOL/L (ref 94–111)
CO2 SERPL-SCNC: 26 MMOL/L (ref 21–33)
CREAT SERPL-MCNC: 0.8 MG/DL (ref 0.7–1.2)
ERYTHROCYTE [DISTWIDTH] IN BLOOD BY AUTOMATED COUNT: 13.2 % (ref 11.6–14.5)
GLOBULIN SER CALC-MCNC: 3.4 G/DL
GLUCOSE SERPL-MCNC: 105 MG/DL (ref 70–110)
HCT VFR BLD AUTO: 40.6 % (ref 35–45)
HGB BLD-MCNC: 12.9 G/DL (ref 12–16)
MCH RBC QN AUTO: 29.4 PG (ref 24–34)
MCHC RBC AUTO-ENTMCNC: 31.8 G/DL (ref 31–37)
MCV RBC AUTO: 92.5 FL (ref 74–97)
PLATELET # BLD AUTO: 323 K/UL (ref 135–420)
PMV BLD AUTO: 9.7 FL (ref 9.2–11.8)
POTASSIUM SERPL-SCNC: 3.4 MMOL/L (ref 3.2–5.1)
PROT SERPL-MCNC: 7.6 G/DL (ref 6.1–8.4)
RBC # BLD AUTO: 4.39 M/UL (ref 4.2–5.3)
SODIUM SERPL-SCNC: 141 MMOL/L (ref 135–145)
TSH SERPL DL<=0.05 MIU/L-ACNC: 1.62 UIU/ML (ref 0.35–6.2)
WBC # BLD AUTO: 9.6 K/UL (ref 4.6–13.2)

## 2021-06-08 PROCEDURE — 36415 COLL VENOUS BLD VENIPUNCTURE: CPT

## 2021-06-08 PROCEDURE — 84439 ASSAY OF FREE THYROXINE: CPT

## 2021-06-08 PROCEDURE — 80061 LIPID PANEL: CPT

## 2021-06-08 PROCEDURE — 86376 MICROSOMAL ANTIBODY EACH: CPT

## 2021-06-08 PROCEDURE — 85027 COMPLETE CBC AUTOMATED: CPT

## 2021-06-08 PROCEDURE — 80183 DRUG SCRN QUANT OXCARBAZEPIN: CPT

## 2021-06-08 PROCEDURE — 83036 HEMOGLOBIN GLYCOSYLATED A1C: CPT

## 2021-06-08 PROCEDURE — 82306 VITAMIN D 25 HYDROXY: CPT

## 2021-06-08 PROCEDURE — 80053 COMPREHEN METABOLIC PANEL: CPT

## 2021-06-08 PROCEDURE — 82607 VITAMIN B-12: CPT

## 2021-06-08 PROCEDURE — 84443 ASSAY THYROID STIM HORMONE: CPT

## 2021-06-09 LAB
25(OH)D3 SERPL-MCNC: 51.7 NG/ML (ref 30–100)
CHOLEST SERPL-MCNC: 266 MG/DL
EST. AVERAGE GLUCOSE BLD GHB EST-MCNC: 114 MG/DL
HBA1C MFR BLD: 5.6 % (ref 4–5.6)
HDLC SERPL-MCNC: 68 MG/DL
HDLC SERPL: 3.9 {RATIO} (ref 0–5)
LDLC SERPL CALC-MCNC: 167.6 MG/DL (ref 0–100)
LIPID PROFILE,FLP: ABNORMAL
T4 FREE SERPL-MCNC: 0.9 NG/DL (ref 0.8–1.5)
TRIGL SERPL-MCNC: 152 MG/DL (ref ?–150)
VIT B12 SERPL-MCNC: 352 PG/ML (ref 193–986)
VLDLC SERPL CALC-MCNC: 30.4 MG/DL

## 2021-06-10 LAB
THYROPEROXIDASE AB SERPL-ACNC: 11 IU/ML (ref 0–34)
TSI ACT/NOR SER: <0.1 IU/L (ref 0–0.55)

## 2021-06-10 RX ORDER — VENLAFAXINE HYDROCHLORIDE 75 MG/1
75 CAPSULE, EXTENDED RELEASE ORAL DAILY
Qty: 90 CAPSULE | Refills: 1 | Status: SHIPPED | OUTPATIENT
Start: 2021-06-10 | End: 2021-06-28 | Stop reason: DRUGHIGH

## 2021-06-10 RX ORDER — ATORVASTATIN CALCIUM 40 MG/1
40 TABLET, FILM COATED ORAL DAILY
Qty: 90 TABLET | Refills: 0 | Status: SHIPPED | OUTPATIENT
Start: 2021-06-10 | End: 2021-09-03

## 2021-06-10 NOTE — PROGRESS NOTES
Patient aware. Since all of her thyroid labs came back normal, she asked if this means she has nothing to worry about with the thyroid nodule?

## 2021-06-10 NOTE — PROGRESS NOTES
Please advise patient that her cholesterol is excessively high, we are going to have to start a statin please send in atorvastatin 40 mg nightly thank you

## 2021-06-14 DIAGNOSIS — M50.00 HNP (HERNIATED NUCLEUS PULPOSUS) WITH MYELOPATHY, CERVICAL: ICD-10-CM

## 2021-06-14 DIAGNOSIS — M50.30 DDD (DEGENERATIVE DISC DISEASE), CERVICAL: ICD-10-CM

## 2021-06-14 DIAGNOSIS — L40.50 PSORIATIC ARTHRITIS (HCC): ICD-10-CM

## 2021-06-14 DIAGNOSIS — S16.1XXA STRAIN OF NECK MUSCLE, INITIAL ENCOUNTER: ICD-10-CM

## 2021-06-14 DIAGNOSIS — M47.812 CERVICAL SPONDYLOSIS WITHOUT MYELOPATHY: ICD-10-CM

## 2021-06-14 DIAGNOSIS — M79.7 FIBROMYALGIA: ICD-10-CM

## 2021-06-14 DIAGNOSIS — G56.01 CARPAL TUNNEL SYNDROME, RIGHT: ICD-10-CM

## 2021-06-23 ENCOUNTER — TELEPHONE (OUTPATIENT)
Dept: FAMILY MEDICINE CLINIC | Age: 46
End: 2021-06-23

## 2021-06-23 DIAGNOSIS — M79.7 FIBROMYALGIA: ICD-10-CM

## 2021-06-23 DIAGNOSIS — G62.9 NEUROPATHY: ICD-10-CM

## 2021-06-23 NOTE — TELEPHONE ENCOUNTER
Pt called and needs Gabapentin & Vitamin D refilled. She said that Pharmacy said that she needed an appt. Rosio Ocampo said that Claude Moro was just seen on 5/27.     She said that [facility] might be using an old prescription

## 2021-06-24 RX ORDER — ERGOCALCIFEROL 1.25 MG/1
50000 CAPSULE ORAL
Qty: 12 CAPSULE | Refills: 1 | Status: ON HOLD | OUTPATIENT
Start: 2021-06-24 | End: 2022-03-23 | Stop reason: CLARIF

## 2021-06-27 RX ORDER — GABAPENTIN 300 MG/1
CAPSULE ORAL
Qty: 90 CAPSULE | Refills: 1 | Status: SHIPPED | OUTPATIENT
Start: 2021-06-27 | End: 2021-08-17 | Stop reason: SDUPTHER

## 2021-06-28 RX ORDER — VENLAFAXINE HYDROCHLORIDE 150 MG/1
150 CAPSULE, EXTENDED RELEASE ORAL DAILY
Qty: 90 CAPSULE | Refills: 0 | Status: SHIPPED | OUTPATIENT
Start: 2021-06-28 | End: 2021-08-31

## 2021-06-28 NOTE — TELEPHONE ENCOUNTER
Attempted to call patient and let her know about increased rx, no answer and VM is not set up. I tried her again about 15 minutes later and still no answer.

## 2021-07-16 ENCOUNTER — TELEPHONE (OUTPATIENT)
Dept: ORTHOPEDIC SURGERY | Age: 46
End: 2021-07-16

## 2021-07-16 NOTE — TELEPHONE ENCOUNTER
Carmen from [facility] called stating they received a referral for this patient's EMG. She stated the EMG has been denied by [employer], and the insurance needs to be contacted for a prior authorization. Claim #: [id number], codes used are [phone], 9869 6022, and d56.01, per Carmen. Carmen can be reached at [phone].

## 2021-07-21 ENCOUNTER — TELEPHONE (OUTPATIENT)
Dept: FAMILY MEDICINE CLINIC | Age: 46
End: 2021-07-21

## 2021-07-21 DIAGNOSIS — F40.240 CLAUSTROPHOBIA: Primary | ICD-10-CM

## 2021-07-21 NOTE — TELEPHONE ENCOUNTER
I contacted [facility] regarding this EMG RUE. Per [place], [address] [facility] and [address] do not require pre-authorization if the provider is in network. Out of network providers do require pre-authorization and the patient does have [employer] benefits. I contacted Carmen at Dr Aguirre Erik office and she states they are in network. I asked if she has called [facility] to clear the denial and she has not. She will contact [facility] to try to get the denial released since they are in network. Carmen was advised to contact me if she required assistance.

## 2021-07-21 NOTE — TELEPHONE ENCOUNTER
Pt has an MRI of the neck scheduled tomorrow and the hospital just told her today (wed 7/21) about contacting her PCP if she's claustrophobic. So, the Pt wanted something for that. She uses [facility]. The MRI is scheduled for 1:30 p.m.

## 2021-07-22 ENCOUNTER — HOSPITAL ENCOUNTER (OUTPATIENT)
Age: 46
Discharge: HOME | End: 2021-07-22
Attending: PHYSICAL MEDICINE & REHABILITATION
Payer: MEDICAID

## 2021-07-22 PROCEDURE — 72141 MRI NECK SPINE W/O DYE: CPT

## 2021-07-22 RX ORDER — DIAZEPAM 5 MG/1
TABLET ORAL
Qty: 2 TABLET | Refills: 0 | Status: SHIPPED | OUTPATIENT
Start: 2021-07-22 | End: 2021-08-31 | Stop reason: ALTCHOICE

## 2021-07-22 NOTE — TELEPHONE ENCOUNTER
Please advise that I sent in prn valium for procedure; remind her that she must have someone to drive her while on med thanks

## 2021-08-02 DIAGNOSIS — G62.9 NEUROPATHY: ICD-10-CM

## 2021-08-03 RX ORDER — PREGABALIN 100 MG/1
CAPSULE ORAL
Qty: 180 CAPSULE | Refills: 1 | Status: SHIPPED | OUTPATIENT
Start: 2021-08-03 | End: 2022-03-07

## 2021-08-05 NOTE — PROGRESS NOTES
[address] [place] [address] PHYSICAL THERAPY  [address] [place] [address] Dr BEATTY, [address] [facility], [address] * Phone: [phone] * Fax: [phone]  DISCHARGE SUMMARY FOR PHYSICAL THERAPY            Patient Name: Hosea Torrez : 1975   Treatment/Medical Diagnosis: Pain in right shoulder [M25.511]   Onset Date: 2/15/2021    Referral Source: Arlyn Yan MD Start of Care [facility]): 5/3/2021   Prior Hospitalization: See Medical History Provider #: [id number]   Prior Level of Function: Ind with all ADLs, pain free   Comorbidities: Fibromyalgia, IBS, Psoriasis, Psoriatic Arthritis   Medications: Verified on Patient Summary List   Visits from [facility]: 4 Missed Visits: 0       Subjective:  Pt presents to PT ~10 minutes late for today's session. Pt complains of L thoracic back pain at start of today's visit. (per last attended session on 2021)    Objective Measures: (per initial visit)     Palpation:   Pt denies TTP B shoulders and cervical spine.        Posture:   Pt presents with forward head and rounded shoulders.     Cervical ROM:       AROM PROM Symptoms   Flexion 90% 90%     Extension 85% 85%     Abduction 85% 85%     SB R 60% 90% \"Pulling R\"   SB L 60% 90% \"Pulling L\"   Rot L 75% 80%     Rot R 75% 80%           Shoulder ROM:                                                 AROM                         PROM    Left Right   Left Right   Flexion             Extension             Abduction             ER @ 0 Degrees             ER @ 90 Degrees             IR @ 90 Degrees             Gross B shoulder ROM WFL; Pt reports \"moderate pain\" with gross B shoulder AROM.       UE Strength:                                                                             Left Right Pain   Flexion 4-/5  4/5 [x]? ? Yes   []? ? No   Abduction 3+/5  4/5 [x]? ? Yes   []? ? No   Extension     []? ? Yes   []? ? No   External Rotation 4/5 4/5 []? ? Yes   [x]? ? No   Internal Rotation 4/5 4/5 []? ? Yes   [x]? ? No   Horizontal Abduction 4-/5 4-/5 []?? Yes   [x]? ? No      Other tests/comments:   Apley: L ER: C4, R IR: L2              L IR: L2, R ER: C2     QuickDASH: 65.91     Short Term Goals:  1. Patient will report the knowledge of 3 exercises that can be used to help reduce symptoms to be able to ind reduce symptoms while at home. Not Met.  2. Patient will demonstrate a 1/2 grade improvement in L shoulder MMT to be able to better reach overhead. Not Met.  3. Patient will demonstrate Apley Scratch Test: ER: C7, IR: T10 to facilitate increased ability to perform daily activities. Not Met.  4. Patient will decrease QuickDASH > 10 to facilitate increased ability to perform functional activities of choice. Not Met.     Long Term Goals:  1. Patient will demonstrate a 2 grade improvement in L shoulder MMT to be able to better lift heavy objects. Not Met.  2. Patient will report < 3/10 pain when performing all leisure activities of choice. Not Met.  3. Patient will decrease QuickDASH > 20 to facilitate increased ability to perform leisure activities of choice. Not Met.          Key Functional Changes/Progress: unable to assess as pt did not return for objective reassessment. Assessments/Recommendations: Discontinue therapy due to lack of attendance or compliance. If you have any questions/comments please contact us directly at [phone]. Thank you for allowing us to assist in the care of your patient. Therapist Signature: Olimpia Tello PT, DPT Date: 8/5/2021   Reporting Period: 05/03/2021 - 05/21/2021 Time: 5:53 AM      Certification Period: 05/03/2021 - 06/14/2021       NOTE TO PHYSICIAN:  PLEASE COMPLETE THE ORDERS BELOW AND FAX TO   [facility] Physical Therapy: [phone]. If you are unable to process this request in 24 hours please contact our office: [phone].    ___ I have read the above report and request that my patient be discharged from therapy.      Physician Signature:        Date: Time:

## 2021-08-05 NOTE — PROGRESS NOTES
[facility] PHYSICAL THERAPY  [address] [place] [address] Dr BEATTY, [address] [place] [address], [facility] * Phone: [phone] * Fax: [phone]  DISCHARGE SUMMARY FOR PHYSICAL THERAPY            Patient Name: Zeynep Sibley : 1975   Treatment/Medical Diagnosis: Low back pain [M54.5]  Pain in thoracic spine [M54.6]   Onset Date: 2/15/2021    Referral Source: Warren Villatoro MD [facility]): 3/3/2021   Prior Hospitalization: See Medical History Provider #: [id number]   Prior Level of Function: Independent, pain-free   Comorbidities: Fibromyalgia, IBS, Psoriasis, Psoriatic arthritis    Medications: Verified on Patient Summary List   Visits from [facility]: 3 Missed Visits: 0       Subjective:  \"My pain is really bad today, Its mostly in my rib area. \" (per last attended session on 3/31/2021)    Objective Measures: (per initial visit)  Palpation: tenderness most noted along right lower lumbar, grossly along lumbar region     Posture: forward head posture, protruding abdomen     Active Movements: []?? N/A   []? ? Too acute   []? ? Other:  Thoracolumbar ROM  AROM PROM Comments:pain, area   Forward flexion  Mod limitation    lumbar pain reported   Extension  Min limitation    cervical pain reported   SB right  WFL    lumbar pain reported   SB left  WFL    lumbar pain reported   Rotation right  Max limitation    lumbar pain reported   Rotation left  Max limitation    lumbar pain reported      Neuro Screen [x]? ? WNL                                                            AROM                       PROM                         MMT      Left Right Left Right Left Right   Hip Flexion         5/5 5/5     Extension                 Abduction                 Adduction                 Internal Rotation                 External rotation               Knee Flexion [facility]     5/5 5/5     Extension [facility]     5/5 5/5   Ankle Dorsiflexion [facility] WFL     5/5 5/5     Plantarflexion WFL WFL     5/5 5/5     Inversion                 Eversion                     Special Tests               Hip:            Yin:              []? ? R    []? ? L    []? ? +    []? ? -                           Scour:              []? ? R    []? ? L    []? ? +    [x]? ? -                           Piriformis:        [x]? ? R    []? ? L    [x]? ? +    []? ? -      Gait:                [x]? ? Normal       []? ? Abnormal:        Short Term Goals:  1. Pt will be independent with HEP to improve lumbar AROM and stability in 3 weeks. Not Met.  2. Pt will report no greater than 6/10 pain in low back with daily activity in 3 weeks. Not Met.        Long Term Goals:  3. Pt will demonstrate pain-free lumbar AROM in all planes for improved ADL efficiency in 6 weeks. Not Met. 4. Pt will report no greater than 2-3/10 pain in low back with daily activity in 6 weeks. Not Met.  5. Pt will report ability to perform household chores & desired activities outdoors at at least 75% capacity in 6 weeks. Not Met. Assessments/Recommendations: Discontinue therapy due to lack of attendance or compliance. If you have any questions/comments please contact us directly at [phone]. Thank you for allowing us to assist in the care of your patient. Therapist Signature: Jazmin Brenner PT, DPT Date: 8/5/2021   Reporting Period: 03/03/2021-03/31/2021 Time: 9:14 AM      Certification Period: 03/03/2021 - 05/30/2021       NOTE TO PHYSICIAN:  PLEASE COMPLETE THE ORDERS BELOW AND FAX TO   [facility] Physical Therapy: [phone]. If you are unable to process this request in 24 hours please contact our office: [phone].    ___ I have read the above report and request that my patient be discharged from therapy.      Physician Signature:        Date:       Time:
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
## 2021-08-13 ENCOUNTER — OFFICE VISIT (OUTPATIENT)
Dept: ORTHOPEDIC SURGERY | Age: 46
End: 2021-08-13
Payer: MEDICAID

## 2021-08-13 VITALS
RESPIRATION RATE: 16 BRPM | HEART RATE: 88 BPM | HEIGHT: 61 IN | WEIGHT: 199 LBS | OXYGEN SATURATION: 98 % | TEMPERATURE: 97.9 F | BODY MASS INDEX: 37.57 KG/M2

## 2021-08-13 DIAGNOSIS — G56.01 CARPAL TUNNEL SYNDROME, RIGHT: ICD-10-CM

## 2021-08-13 DIAGNOSIS — M54.6 THORACIC SPINE PAIN: ICD-10-CM

## 2021-08-13 DIAGNOSIS — M47.814 THORACIC SPONDYLOSIS WITHOUT MYELOPATHY: ICD-10-CM

## 2021-08-13 DIAGNOSIS — S29.019A THORACIC MYOFASCIAL STRAIN, INITIAL ENCOUNTER: ICD-10-CM

## 2021-08-13 DIAGNOSIS — S16.1XXA STRAIN OF NECK MUSCLE, INITIAL ENCOUNTER: ICD-10-CM

## 2021-08-13 DIAGNOSIS — M54.50 LUMBAR PAIN: ICD-10-CM

## 2021-08-13 DIAGNOSIS — M79.7 FIBROMYALGIA: ICD-10-CM

## 2021-08-13 DIAGNOSIS — L40.50 PSORIATIC ARTHRITIS (HCC): ICD-10-CM

## 2021-08-13 DIAGNOSIS — M54.50 LOW BACK PAIN AT MULTIPLE SITES: Primary | ICD-10-CM

## 2021-08-13 DIAGNOSIS — M50.00 HNP (HERNIATED NUCLEUS PULPOSUS) WITH MYELOPATHY, CERVICAL: ICD-10-CM

## 2021-08-13 DIAGNOSIS — M47.812 CERVICAL SPONDYLOSIS WITHOUT MYELOPATHY: ICD-10-CM

## 2021-08-13 DIAGNOSIS — M50.30 DDD (DEGENERATIVE DISC DISEASE), CERVICAL: ICD-10-CM

## 2021-08-13 PROCEDURE — 99214 OFFICE O/P EST MOD 30 MIN: CPT | Performed by: PHYSICAL MEDICINE & REHABILITATION

## 2021-08-13 RX ORDER — METHOCARBAMOL 500 MG/1
500 TABLET, FILM COATED ORAL 4 TIMES DAILY
Qty: 40 TABLET | Refills: 0 | Status: SHIPPED | OUTPATIENT
Start: 2021-08-13 | End: 2021-10-27 | Stop reason: ALTCHOICE

## 2021-08-13 NOTE — LETTER
8/13/2021    Patient: Alexis Gurrola   YOB: 1975   Date of Visit: 8/13/2021     Micah Lassiter NP  [address]  Via In Basket    Dear Micah Lassiter NP,      Thank you for referring Ms. Tigre Bajwa to [address] for evaluation. My notes for this consultation are attached. If you have questions, please do not hesitate to call me. I look forward to following your patient along with you.       Sincerely,    Zara Vivar MD

## 2021-08-16 ENCOUNTER — TELEPHONE (OUTPATIENT)
Dept: FAMILY MEDICINE CLINIC | Age: 46
End: 2021-08-16

## 2021-08-16 NOTE — TELEPHONE ENCOUNTER
There is no notation of that in the chart-she is currently on lyrica, gabapentin, and robaxin.   Dr. Kaia Perera ordered PT, I would like for her to try that first

## 2021-08-16 NOTE — TELEPHONE ENCOUNTER
Patient said that she is going to do the physical therapy because she actually asked him to order it. She said that he told her he was surprised that she was not on a higher dosage of gabapentin, and that he was going to put in her visit note that her gabapentin could be increased, so she said she is going to contact his office to get it straight.

## 2021-08-16 NOTE — TELEPHONE ENCOUNTER
Patient called and left a message stating that she saw her ortho and one of the treatments he recommended was upping her Gabapentin and would put what they recommended in their note for NP to go by. Per chart patient saw Dr. Alcides Herzog. If you have any questions call patient back to followup.  benji Childress

## 2021-08-17 DIAGNOSIS — M79.7 FIBROMYALGIA: ICD-10-CM

## 2021-08-17 DIAGNOSIS — G62.9 NEUROPATHY: ICD-10-CM

## 2021-08-17 RX ORDER — GABAPENTIN 300 MG/1
CAPSULE ORAL
Qty: 180 CAPSULE | Refills: 1 | Status: SHIPPED | OUTPATIENT
Start: 2021-08-17 | End: 2021-11-29 | Stop reason: SDUPTHER

## 2021-08-31 ENCOUNTER — OFFICE VISIT (OUTPATIENT)
Dept: FAMILY MEDICINE CLINIC | Age: 46
End: 2021-08-31
Payer: MEDICAID

## 2021-08-31 VITALS
SYSTOLIC BLOOD PRESSURE: 138 MMHG | OXYGEN SATURATION: 98 % | WEIGHT: 199.4 LBS | TEMPERATURE: 97.6 F | RESPIRATION RATE: 19 BRPM | DIASTOLIC BLOOD PRESSURE: 86 MMHG | HEIGHT: 61 IN | BODY MASS INDEX: 37.65 KG/M2 | HEART RATE: 67 BPM

## 2021-08-31 DIAGNOSIS — F41.9 ANXIETY: Primary | ICD-10-CM

## 2021-08-31 DIAGNOSIS — R10.2 SUPRAPUBIC PAIN: ICD-10-CM

## 2021-08-31 PROCEDURE — 99213 OFFICE O/P EST LOW 20 MIN: CPT | Performed by: NURSE PRACTITIONER

## 2021-08-31 NOTE — PROGRESS NOTES
Nette Paniagua is a 54 y. o.female presents with   Chief Complaint   Patient presents with    Side Pain       79-year-old female presents today in office for follow-up related to suprapubic pain. She states pain started roughly week and a half ago she explained that that pain became severe then slowly started to diminish she did report an episode of vaginal bleeding however she states that it subsided as well denies nausea vomiting fever. Patient also requests referral for counselor related to anxiety she denies homicidal suicidal ideation. Subjective:           Past Medical History:   Diagnosis Date    Fibromyalgia     IBS (irritable bowel syndrome)     Psoriasis     Psoriatic arthritis (HCC)     Right hand pain      Past Surgical History:   Procedure Laterality Date    HX ORTHOPAEDIC      ankle    HX WISDOM TEETH EXTRACTION       Social History     Socioeconomic History    Marital status:      Spouse name: Not on file    Number of children: Not on file    Years of education: Not on file    Highest education level: Not on file   Tobacco Use    Smoking status: Never Smoker    Smokeless tobacco: Never Used   Vaping Use    Vaping Use: Never used   Substance and Sexual Activity    Alcohol use: Yes     Comment: social    Drug use: Never     Social Determinants of Health     Financial Resource Strain:     Difficulty of Paying Living Expenses:    Food Insecurity:     Worried About Running Out of Food in the Last Year:     Ran Out of Food in the Last Year:    Transportation Needs:     Lack of Transportation (Medical):      Lack of Transportation (Non-Medical):    Physical Activity:     Days of Exercise per Week:     Minutes of Exercise per Session:    Stress:     Feeling of Stress :    Social Connections:     Frequency of Communication with Friends and Family:     Frequency of Social Gatherings with Friends and Family:     Attends Congregational Services:     Active Member of Clubs or Organizations:     Attends Club or Organization Meetings:     Marital Status:      Current Outpatient Medications   Medication Sig Dispense Refill    gabapentin (NEURONTIN) 300 mg capsule 2 cap po tid 180 Capsule 1    methocarbamoL (ROBAXIN) 500 mg tablet Take 1 Tablet by mouth four (4) times daily. 40 Tablet 0    pregabalin (LYRICA) 100 mg capsule TAKE 1 CAPSULE BY MOUTH TWICE DAILY. MAX DAILY AMOUNT: 200  Capsule 1    ergocalciferol (ERGOCALCIFEROL) 1,250 mcg (50,000 unit) capsule Take 1 Capsule by mouth every seven (7) days. 12 Capsule 1    atorvastatin (LIPITOR) 40 mg tablet Take 1 Tablet by mouth daily. 90 Tablet 0    Xeljanz XR 11 mg Tb24       Bifidobacterium Infantis (Align) 4 mg cap 1 cap po once daily 90 Cap 1    albuterol (PROVENTIL HFA, VENTOLIN HFA, PROAIR HFA) 90 mcg/actuation inhaler 2 puffs q4h prn sob 1 Inhaler 2    cyanocobalamin (VITAMIN B12) 1,000 mcg/mL injection 1 ml sc once daily x 7 days; then 1 ml sc once weekly x 4 wks; then 1 ml sc once monthly thereafter (Patient taking differently: 1,000 mcg by SubCUTAneous route every month. 1 ml sc once daily x 7 days; then 1 ml sc once weekly x 4 wks; then 1 ml sc once monthly thereafter) 12 Vial 2    acetaminophen (Tylenol Arthritis Pain) 650 mg TbER Take 650 mg by mouth every eight (8) hours. (Patient not taking: Reported on 8/13/2021)       Allergies   Allergen Reactions    Penicillins Hives     The patient has a family history of    REVIEW OF SYSTEMS  Review of Systems   Constitutional: Negative for chills and fever. Cardiovascular: Negative for chest pain and palpitations. Gastrointestinal: Negative for abdominal pain, nausea and vomiting. Genitourinary: Negative for dysuria, flank pain, frequency and urgency. Psychiatric/Behavioral: The patient is nervous/anxious.         Objective:     Visit Vitals  /86 (BP 1 Location: Left upper arm, BP Patient Position: Sitting, BP Cuff Size: Large adult)   Pulse 67   Temp 97.6 °F (36.4 °C) (Temporal)   Resp 19   Ht 5' 1\" (1.549 m)   Wt 199 lb 6.4 oz (90.4 kg)   SpO2 98%   BMI 37.68 kg/m²       Current Outpatient Medications   Medication Instructions    acetaminophen (TYLENOL ARTHRITIS PAIN) 650 mg, EVERY 8 HOURS    albuterol (PROVENTIL HFA, VENTOLIN HFA, PROAIR HFA) 90 mcg/actuation inhaler 2 puffs q4h prn sob    atorvastatin (LIPITOR) 40 mg, Oral, DAILY    Bifidobacterium Infantis (Align) 4 mg cap 1 cap po once daily    cyanocobalamin (VITAMIN B12) 1,000 mcg/mL injection 1 ml sc once daily x 7 days; then 1 ml sc once weekly x 4 wks; then 1 ml sc once monthly thereafter    ergocalciferol (ERGOCALCIFEROL) 50,000 Units, Oral, EVERY 7 DAYS    gabapentin (NEURONTIN) 300 mg capsule 2 cap po tid    methocarbamoL (ROBAXIN) 500 mg, Oral, 4 TIMES DAILY    pregabalin (LYRICA) 100 mg capsule TAKE 1 CAPSULE BY MOUTH TWICE DAILY. MAX DAILY AMOUNT: 200 MG    Xeljanz XR 11 mg Tb24 No dose, route, or frequency recorded. PHYSICAL EXAM  Physical Exam  Constitutional:       Appearance: Normal appearance. Cardiovascular:      Heart sounds: Normal heart sounds. Pulmonary:      Breath sounds: Normal breath sounds. Abdominal:      General: Bowel sounds are normal.      Palpations: Abdomen is soft. Tenderness: There is no abdominal tenderness. There is no right CVA tenderness or left CVA tenderness. Neurological:      Mental Status: She is alert and oriented to person, place, and time. Psychiatric:         Mood and Affect: Mood normal.         Behavior: Behavior normal.         Thought Content: Thought content normal.         Assessment/Plan:     Diagnoses and all orders for this visit:    1. Anxiety  -     REFERRAL TO PSYCHIATRY  Advise     If at any time you feel unsafe and may hurt yourself or others, either call '911' or go to the nearest emergency room. Contact the nearest Hospital Emergency Room in cases that result in a medical emergency.   Contact a friend / family member to discuss what you are feeling and solicit support. 2. Suprapubic pain  I made myself very clear in instructing the patient to follow-up if she develops recurrence of vaginal bleeding or if pain worsens. Patient verbalized understanding. Follow-up and Dispositions    · Return in about 6 months (around 2/28/2022). Disclaimer:    I have discussed the diagnosis with the patient and the intended plan as seen above. The patient understands our medical plan. The risks, benefits and significant side effects of all medications have been reviewed. Anticipated time course and progression of condition reviewed. All questions have been addressed. She received an after visit summary, with information reviewed, and questions answered. Where appropriate, she is instructed to call the clinic if she has not been notified either by phone or through [address] with the results of her tests or with an appointment plan for any referrals within 1 week(s). The patient  is to call if her condition worsens or fails to improve or if significant side effects are experienced.        Zachary Cruz, NP

## 2021-09-03 RX ORDER — ATORVASTATIN CALCIUM 40 MG/1
40 TABLET, FILM COATED ORAL DAILY
Qty: 90 TABLET | Refills: 0 | Status: SHIPPED | OUTPATIENT
Start: 2021-09-03 | End: 2021-10-27 | Stop reason: ALTCHOICE

## 2021-09-29 ENCOUNTER — TELEPHONE (OUTPATIENT)
Dept: ORTHOPEDIC SURGERY | Age: 46
End: 2021-09-29

## 2021-09-29 ENCOUNTER — TELEPHONE (OUTPATIENT)
Dept: FAMILY MEDICINE CLINIC | Age: 46
End: 2021-09-29

## 2021-09-29 DIAGNOSIS — S16.1XXA STRAIN OF NECK MUSCLE, INITIAL ENCOUNTER: ICD-10-CM

## 2021-09-29 DIAGNOSIS — M79.7 FIBROMYALGIA: ICD-10-CM

## 2021-09-29 DIAGNOSIS — M50.30 DDD (DEGENERATIVE DISC DISEASE), CERVICAL: ICD-10-CM

## 2021-09-29 DIAGNOSIS — M47.812 CERVICAL SPONDYLOSIS WITHOUT MYELOPATHY: Primary | ICD-10-CM

## 2021-09-29 NOTE — TELEPHONE ENCOUNTER
Patient called to inquire about referral to physical therapy. States she has yet to receive a call to schedule. Advised patient per referral notes that multiple attempts had been made and were unsuccessful because of full voicemail.  Patient states she has had problems with her voicemail for awhile and provided an alternate phone number to call    [facility] #: [phone]
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
## 2021-10-05 ENCOUNTER — HOSPITAL ENCOUNTER (OUTPATIENT)
Dept: PHYSICAL THERAPY | Age: 46
Discharge: HOME | End: 2021-10-05
Payer: MEDICAID

## 2021-10-05 PROCEDURE — 97163 PT EVAL HIGH COMPLEX 45 MIN: CPT

## 2021-10-05 PROCEDURE — 97530 THERAPEUTIC ACTIVITIES: CPT

## 2021-10-05 PROCEDURE — 97162 PT EVAL MOD COMPLEX 30 MIN: CPT
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
## 2021-10-08 ENCOUNTER — TELEPHONE (OUTPATIENT)
Dept: FAMILY MEDICINE CLINIC | Age: 46
End: 2021-10-08

## 2021-10-08 NOTE — TELEPHONE ENCOUNTER
I scheduled pt for 10/21. She reported that Effexor is causing her some issues. She said it's causing sexual dysfunction. I told her Yahaira Potter is out and I told her that Yahaira Potter had suggested urgent care and ER from last week, but pt reported that she didn't know what they would do for her. She said she's going to wean herself off of Effexor. I saw that the psychiatry referral was faxed over. I'm going to call pt back with their phone number.

## 2021-10-20 ENCOUNTER — APPOINTMENT (OUTPATIENT)
Dept: PHYSICAL THERAPY | Age: 46
End: 2021-10-20
Payer: MEDICAID

## 2021-10-21 ENCOUNTER — OFFICE VISIT (OUTPATIENT)
Dept: FAMILY MEDICINE CLINIC | Age: 46
End: 2021-10-21
Payer: MEDICAID

## 2021-10-21 VITALS
BODY MASS INDEX: 37.76 KG/M2 | HEART RATE: 96 BPM | HEIGHT: 61 IN | TEMPERATURE: 98.2 F | DIASTOLIC BLOOD PRESSURE: 86 MMHG | WEIGHT: 200 LBS | SYSTOLIC BLOOD PRESSURE: 124 MMHG | OXYGEN SATURATION: 97 %

## 2021-10-21 DIAGNOSIS — F41.9 ANXIETY: Primary | ICD-10-CM

## 2021-10-21 PROCEDURE — 99213 OFFICE O/P EST LOW 20 MIN: CPT | Performed by: NURSE PRACTITIONER

## 2021-10-21 NOTE — PROGRESS NOTES
Elan Campbell presents today for   Chief Complaint   Patient presents with    Follow-up       Is someone accompanying this pt? no    Is the patient using any DME equipment during OV? no    Depression Screening:  3 most recent PHQ Screens 10/21/2021   PHQ Not Done -   Little interest or pleasure in doing things Several days   Feeling down, depressed, irritable, or hopeless Several days   Total Score PHQ 2 2   Trouble falling or staying asleep, or sleeping too much -   Feeling tired or having little energy -   Poor appetite, weight loss, or overeating -   Feeling bad about yourself - or that you are a failure or have let yourself or your family down -   Trouble concentrating on things such as school, work, reading, or watching TV -   Moving or speaking so slowly that other people could have noticed; or the opposite being so fidgety that others notice -   Thoughts of being better off dead, or hurting yourself in some way -   PHQ 9 Score -   How difficult have these problems made it for you to do your work, take care of your home and get along with others -       Learning Assessment:  Learning Assessment 9/25/2020   PRIMARY LEARNER Patient   HIGHEST LEVEL OF EDUCATION - PRIMARY LEARNER  SOME COLLEGE   BARRIERS PRIMARY LEARNER NONE   CO-LEARNER CAREGIVER No   PRIMARY LANGUAGE ENGLISH   LEARNER PREFERENCE PRIMARY LISTENING   ANSWERED BY patient   RELATIONSHIP SELF       Fall Risk  Fall Risk Assessment, last 12 mths 3/8/2021   Able to walk? Yes   Fall in past 12 months? 0       Health Maintenance reviewed and discussed and ordered per Provider. Health Maintenance Due   Topic Date Due    Hepatitis C Screening  Never done    COVID-19 Vaccine (1) Never done    DTaP/Tdap/Td series (1 - Tdap) Never done    Cervical cancer screen  Never done    Colorectal Cancer Screening Combo  Never done    Flu Vaccine (1) Never done   . Coordination of Care:  1.  Have you been to the ER, urgent care clinic since your last visit? Hospitalized since your last visit? no    2. Have you seen or consulted any other health care providers outside of the [address] [place] [address] since your last visit? Include any pap smears or colon screening.  no

## 2021-10-22 ENCOUNTER — APPOINTMENT (OUTPATIENT)
Dept: PHYSICAL THERAPY | Age: 46
End: 2021-10-22
Payer: MEDICAID

## 2021-10-28 ENCOUNTER — TELEPHONE (OUTPATIENT)
Dept: FAMILY MEDICINE CLINIC | Age: 46
End: 2021-10-28

## 2021-10-28 NOTE — TELEPHONE ENCOUNTER
Pt left a VM wanting an antibiotic sent to [facility]. She said that she has an intestinal bacterial infection and she think it's because she's on Jacquelene Cellar and her immune system is loweredd (????). She said she stopped taking it, but she says the infection is painful and she's nauseous.

## 2021-10-28 NOTE — TELEPHONE ENCOUNTER
Pt aware.   She said she's going to wait and see what happens and if it gets worse, she'll go to the ER>

## 2021-11-01 ENCOUNTER — HOSPITAL ENCOUNTER (OUTPATIENT)
Dept: PHYSICAL THERAPY | Age: 46
Discharge: HOME | End: 2021-11-01
Payer: MEDICAID

## 2021-11-01 PROCEDURE — 97110 THERAPEUTIC EXERCISES: CPT

## 2021-11-01 NOTE — PROGRESS NOTES
PT DAILY TREATMENT NOTE 8-14    Patient Name: Karlee Rubalcava  Date:2021  : 1975  [x]  Patient  Verified  Payor: [address] / Plan: [address] / Product Type: Managed Care Medicaid /    In time:1355  Out time:1420  Total Treatment Time (min): 25  Total Timed Codes (min): 15  1:1 Treatment Time (min): 15   Visit #: 2    Treatment Area: Spondylosis without myelopathy or radiculopathy, cervical region [M47.812]    SUBJECTIVE  Pt stated, \"my neck is feeling horrible. I'm so tired of this pain. \"    Pain Level (0-10 scale): 9    Any medication changes, allergies to medications, adverse drug reactions, diagnosis change, or new procedure performed?: [x] No    [] Yes (see summary sheet for update)    OBJECTIVE  Modality rationale: To perform daily exercises with improved mobility and decreased pain. Min Type Additional Details    [] Estim: []Att   []Unatt  []TENS instruct                 []IFC  []Premod []NMES                       []Other:  []w/US   []w/ice   []w/heat  Position:  Location:    []  Traction: [] Cervical       []Lumbar                       [] Prone          []Supine                       []Intermittent   []Continuous Lbs:  [] before manual  [] after manual    []  Ultrasound: []Continuous   [] Pulsed                           []1MHz   []3MHz Location:  W/cm2:   10 []  Ice     [x]  heat  []  Ice massage Position: seated  Location: cervical region    []  Vasopneumatic Device Pressure: [] lo [] med [] hi   Temp: [] lo [] med [] hi   [] Skin assessment post-treatment:  []intact []redness- no adverse reaction       []redness - adverse reaction:     15 min Therapeutic Exercise:  [x] See flow sheet :   Rationale: increase ROM, increase strength and improve coordination to improve the patients ability to restore function and ability allowing ease for functional activities.       With TE  TA   NR  GT   Misc Patient Education: [x] Review HEP    [] Progressed/Changed HEP based on: [] positioning   [] body mechanics   [] transfers   [] heat/ice application        Pain Level (0-10 scale) post treatment: 9    ASSESSMENT/Changes in Function: Initiated POC working to decrease pain and increase tissue elasticity, cervical AROM, and increase B UE. Session began with MHP followed by stretches to cervical region. Pt stated, \"I haven't slept well in a while, I'm really sleepy\" and requested for today's session to be cut short. Will continue POC and progress as able. Patient will continue to benefit from skilled PT services to modify and progress therapeutic interventions, address functional mobility deficits, address ROM deficits, address strength deficits, analyze and address soft tissue restrictions, analyze and cue movement patterns, analyze and modify body mechanics/ergonomics, assess and modify postural abnormalities and address imbalance/dizziness to attain remaining goals.      [x]  See Plan of Care  []  See progress note/recertification  []  See Discharge Summary         PLAN  []  Upgrade activities as tolerated     [x]  Continue plan of care  []  Update interventions per flow sheet       []  Discharge due to:_  []  Other:_      SAGE Zamudio  11/1/2021  2:20 PM

## 2021-11-04 ENCOUNTER — HOSPITAL ENCOUNTER (OUTPATIENT)
Dept: PHYSICAL THERAPY | Age: 46
Discharge: HOME | End: 2021-11-04
Payer: MEDICAID

## 2021-11-04 PROCEDURE — 97110 THERAPEUTIC EXERCISES: CPT

## 2021-11-04 NOTE — PROGRESS NOTES
PT DAILY TREATMENT NOTE     Patient Name: Ximena Rojas  Date:2021  : 1975  [x]  Patient  Verified  Payor: [address] / Plan: [address] / Product Type: Managed Care Medicaid /    In time:1520  Out time:1600  Total Treatment Time (min):33   Total Timed Codes (min): 33  1:1 Treatment Time (min):  33  Visit #: 4    Treatment Area: Spondylosis without myelopathy or radiculopathy, cervical region [M47.812]    SUBJECTIVE  Pt reported I have 10/10 pain I cant do anything. Pain Level (0-10 scale): 10    Any medication changes, allergies to medications, adverse drug reactions, diagnosis change, or new procedure performed?: [x] No    [] Yes (see summary sheet for update)    OBJECTIVE  Modality rationale: To perform daily exercises with improved mobility and decreased pain. Min Type Additional Details    [] Estim: []Att   []Unatt  []TENS instruct                 []IFC  []Premod []NMES                       []Other:  []w/US   []w/ice   []w/heat  Position:  Location:    []  Traction: [] Cervical       []Lumbar                       [] Prone          []Supine                       []Intermittent   []Continuous Lbs:  [] before manual  [] after manual    []  Ultrasound: []Continuous   [] Pulsed                           []1MHz   []3MHz Location:  W/cm2:   15 []  Ice     [x]  heat  []  Ice massage Position: seated  Location: cervical region    []  Vasopneumatic Device Pressure: [] lo [] med [] hi   Temp: [] lo [] med [] hi   [] Skin assessment post-treatment:  []intact []redness- no adverse reaction       []redness - adverse reaction:     33 min Therapeutic Exercise:  [x] See flow sheet :   Rationale: increase ROM, increase strength and improve coordination to improve the patients ability to restore function and ability allowing ease for functional activities.       With TE  TA   NR  GT   Misc Patient Education: [x] Review HEP    [] Progressed/Changed HEP based on:   [] positioning [] body mechanics   [] transfers   [] heat/ice application        Pain Level (0-10 scale) post treatment: 9    ASSESSMENT/Changes in Function: Contenued POC working to decrease pain and increase tissue elasticity, cervical AROM, and increase B UE. Session began with MHP followed by stretches to cervical region. Pt was able to tolerate RTB for resistance with rows and extensions without increased pain. After session Pt reported less stiffness however continues to have pain. Will continue POC and progress as able. Patient will continue to benefit from skilled PT services to modify and progress therapeutic interventions, address functional mobility deficits, address ROM deficits, address strength deficits, analyze and address soft tissue restrictions, analyze and cue movement patterns, analyze and modify body mechanics/ergonomics, assess and modify postural abnormalities and address imbalance/dizziness to attain remaining goals.      [x]  See Plan of Care  []  See progress note/recertification  []  See Discharge Summary         PLAN  []  Upgrade activities as tolerated     [x]  Continue plan of care  []  Update interventions per flow sheet       []  Discharge due to:_  []  Other:_      SAGE Zamudio  11/4/2021  2:20 PM

## 2021-11-10 ENCOUNTER — HOSPITAL ENCOUNTER (OUTPATIENT)
Dept: PHYSICAL THERAPY | Age: 46
Discharge: HOME | End: 2021-11-10
Payer: MEDICAID

## 2021-11-10 PROCEDURE — 97110 THERAPEUTIC EXERCISES: CPT

## 2021-11-10 NOTE — PROGRESS NOTES
PT DAILY TREATMENT NOTE 8-    Patient Name: Patience Camarillo  Date:11/10/2021  : 1975  [x]  Patient  Verified  Payor: [address] / Plan: [address] / Product Type: Managed Care Medicaid /    In time:1430  Out time:1510  Total Treatment Time (min): 40  Total Timed Codes (min): 30  1:1 Treatment Time (min): 30   Visit #: 4    Treatment Area: Spondylosis without myelopathy or radiculopathy, cervical region [M47.812]    SUBJECTIVE  Pt reports pain and stiffness in neck and B shoulder. Pt is unsure if physical therapy. Pain Level (0-10 scale): 9    Any medication changes, allergies to medications, adverse drug reactions, diagnosis change, or new procedure performed?: [x] No    [] Yes (see summary sheet for update)    OBJECTIVE  Modality rationale: decrease pain and increase tissue extensibility   Min Type Additional Details    [] Estim: []Att   []Unatt  []TENS instruct                 []IFC  []Premod []NMES                       []Other:  []w/US   []w/ice   []w/heat  Position:  Location:    []  Traction: [] Cervical       []Lumbar                       [] Prone          []Supine                       []Intermittent   []Continuous Lbs:  [] before manual  [] after manual    []  Ultrasound: []Continuous   [] Pulsed                           []1MHz   []3MHz Location:  W/cm2:   10 []  Ice     [x]  heat  []  Ice massage Position: seated  Location: cervical region    []  Vasopneumatic Device Pressure: [] lo [] med [] hi   Temp: [] lo [] med [] hi   [] Skin assessment post-treatment:  []intact []redness- no adverse reaction       []redness - adverse reaction:     30 min Therapeutic Exercise:  [x] See flow sheet :   Rationale: increase ROM, increase strength and increase proprioception to improve the patients ability to complete activities pain free.      With TE  TA   NR  GT   Misc Patient Education: [x] Review HEP    [] Progressed/Changed HEP based on:   [] positioning   [] body mechanics [] transfers   [] heat/ice application        Pain Level (0-10 scale) post treatment: 9    ASSESSMENT/Changes in Function: Session began with MHP per patient's request. Exercises completed per flowsheet with lack luster enthusiasm and energy exhibited by patient. Pt states, \"I'm not sure physical therapy is working\" and proceeds to complete banded rows and shoulder extension ignoring verbal cues and demonstration to ensure optimal muscle contraction. Upon completion patient stated today was a bad day and she will try to put more effort into therapy next treatment session. Patient will continue to benefit from skilled PT services to modify and progress therapeutic interventions, address functional mobility deficits, address ROM deficits, address strength deficits, analyze and address soft tissue restrictions, analyze and cue movement patterns, analyze and modify body mechanics/ergonomics and assess and modify postural abnormalities to attain remaining goals.      [x]  See Plan of Care  []  See progress note/recertification  []  See Discharge Summary         PLAN  []  Upgrade activities as tolerated     [x]  Continue plan of care  []  Update interventions per flow sheet       []  Discharge due to:_  []  Other:_      SAGE Zamudio  11/10/2021  3:16 PM

## 2021-11-12 ENCOUNTER — APPOINTMENT (OUTPATIENT)
Dept: PHYSICAL THERAPY | Age: 46
End: 2021-11-12
Payer: MEDICAID

## 2021-11-15 ENCOUNTER — VIRTUAL VISIT (OUTPATIENT)
Dept: FAMILY MEDICINE CLINIC | Age: 46
End: 2021-11-15
Payer: MEDICAID

## 2021-11-15 DIAGNOSIS — F41.8 MIXED ANXIETY AND DEPRESSIVE DISORDER: Primary | ICD-10-CM

## 2021-11-15 PROCEDURE — 99442 PR PHYS/QHP TELEPHONE EVALUATION 11-20 MIN: CPT | Performed by: NURSE PRACTITIONER

## 2021-11-15 NOTE — PROGRESS NOTES
Pursuant to the emergency declaration under the [facility], [facility] waiver authority and the [employer] Act, this phone visit was conducted, with patient's consent, to reduce the patient's risk of exposure to COVID-19 and provide continuity of care for an established patient. She and/or health care decision maker is aware that that she may receive a bill for this telephone service, depending on her insurance coverage, and has provided verbal consent to proceed. This is a Patient Initiated Episode with an Established Patient who has not had a related appointment within my department in the past 7 days or scheduled within the next 24 hours. HISTORY OF PRESENTING ILLNESS      Jessica Cho is a 51 y.o. female evaluated via telephone on 11/15/2021 due to COVID 19 restrictions. Patient unable to participate in Virtual Visit with synchronous audio/visual technology. Patient presents for follow-up to discuss [employer] testing results. She states she recently got in for initial evaluation with Jasen Campbell psych nurse practitioner who started her on Lexapro  PCP Provider  Rehana Huitron NP  Past Medical History:   Diagnosis Date    Fibromyalgia     IBS (irritable bowel syndrome)     Psoriasis     Psoriatic arthritis ([facility] [place] [address]     Right hand pain       Past Surgical History:   Procedure Laterality Date    HX ORTHOPAEDIC      ankle    HX WISDOM TEETH EXTRACTION       Allergies   Allergen Reactions    Penicillins Hives      History reviewed. No pertinent family history. Current Outpatient Medications   Medication Sig    Bifidobacterium Infantis (Align) 4 mg cap TAKE 1 CAPSULE BY MOUTH DAILY    gabapentin (NEURONTIN) 300 mg capsule 2 cap po tid    pregabalin (LYRICA) 100 mg capsule TAKE 1 CAPSULE BY MOUTH TWICE DAILY.  MAX DAILY AMOUNT: 200 MG    ergocalciferol (ERGOCALCIFEROL) 1,250 mcg (50,000 unit) capsule Take 1 Capsule by mouth every seven (7) days.  Xeljanz XR 11 mg Tb24     albuterol (PROVENTIL HFA, VENTOLIN HFA, PROAIR HFA) 90 mcg/actuation inhaler 2 puffs q4h prn sob     No current facility-administered medications for this visit. There were no vitals filed for this visit. Social History     Socioeconomic History    Marital status:      Spouse name: Not on file    Number of children: Not on file    Years of education: Not on file    Highest education level: Not on file   Occupational History    Not on file   Tobacco Use    Smoking status: Never Smoker    Smokeless tobacco: Never Used   Vaping Use    Vaping Use: Never used   Substance and Sexual Activity    Alcohol use: Yes     Comment: social    Drug use: Never    Sexual activity: Not on file   Other Topics Concern    Not on file   Social History Narrative    Not on file     Social Determinants of Health     Financial Resource Strain:     Difficulty of Paying Living Expenses: Not on file   Food Insecurity:     Worried About Running Out of Food in the Last Year: Not on file    Caryl of Food in the Last Year: Not on file   Transportation Needs:     Lack of Transportation (Medical): Not on file    Lack of Transportation (Non-Medical):  Not on file   Physical Activity:     Days of Exercise per Week: Not on file    Minutes of Exercise per Session: Not on file   Stress:     Feeling of Stress : Not on file   Social Connections:     Frequency of Communication with Friends and Family: Not on file    Frequency of Social Gatherings with Friends and Family: Not on file    Attends Protestant Services: Not on file    Active Member of Clubs or Organizations: Not on file    Attends Club or Organization Meetings: Not on file    Marital Status: Not on file   Intimate Partner Violence:     Fear of Current or Ex-Partner: Not on file    Emotionally Abused: Not on file    Physically Abused: Not on file    Sexually Abused: Not on file   Housing Stability:     Unable to Pay for Housing in the Last Year: Not on file    Number of Places Lived in the Last Year: Not on file    Unstable Housing in the Last Year: Not on file       MEDICATIONS     Current Outpatient Medications   Medication Sig    Bifidobacterium Infantis (Align) 4 mg cap TAKE 1 CAPSULE BY MOUTH DAILY    gabapentin (NEURONTIN) 300 mg capsule 2 cap po tid    pregabalin (LYRICA) 100 mg capsule TAKE 1 CAPSULE BY MOUTH TWICE DAILY. MAX DAILY AMOUNT: 200 MG    ergocalciferol (ERGOCALCIFEROL) 1,250 mcg (50,000 unit) capsule Take 1 Capsule by mouth every seven (7) days.  Xeljanz XR 11 mg Tb24     albuterol (PROVENTIL HFA, VENTOLIN HFA, PROAIR HFA) 90 mcg/actuation inhaler 2 puffs q4h prn sob     No current facility-administered medications for this visit. I have reviewed the nurses notes, vitals, problem list, allergy list, medical history, family, social history and medications. REVIEW OF SYMPTOMS     General: Pt denies excessive weight gain or loss. Pt is able to conduct ADL's         PHYSICAL EXAM       Due to this being a telephone encounter a very limited exam was performed  Neurological: A&Ox3, no slurred speech, answering questions appropriately  Respiratory: Non labored, talking in complete sentences, no audible wheeze over the phone        ASSESSMENT        Diagnoses and all orders for this visit:    1. Mixed anxiety and depressive disorder    Fax over a copy of patient's [employer] testing results, per her request to Mayo Alpers NP    ICD-10-CM ICD-9-CM    1. Mixed anxiety and depressive disorder  F41.8 300.4      No orders of the defined types were placed in this encounter. PLAN       TIME 12(Minutes) SPENT RELATED TO THIS PHONE ENCOUNTER    We discussed the expected course, resolution and complications of the diagnosis(es) in detail.   Medication risks, benefits, costs, interactions, and alternatives were discussed as indicated. I advised her to contact the office if her condition worsens, changes or fails to improve as anticipated.  She expressed understanding with the diagnosis(es) and plan

## 2021-11-15 NOTE — PROGRESS NOTES
Lynda Gaming presents today for   Chief Complaint   Patient presents with    Follow-up     genesight results          Depression Screening:  3 most recent PHQ Screens 10/21/2021   PHQ Not Done -   Little interest or pleasure in doing things Several days   Feeling down, depressed, irritable, or hopeless Several days   Total Score PHQ 2 2   Trouble falling or staying asleep, or sleeping too much -   Feeling tired or having little energy -   Poor appetite, weight loss, or overeating -   Feeling bad about yourself - or that you are a failure or have let yourself or your family down -   Trouble concentrating on things such as school, work, reading, or watching TV -   Moving or speaking so slowly that other people could have noticed; or the opposite being so fidgety that others notice -   Thoughts of being better off dead, or hurting yourself in some way -   PHQ 9 Score -   How difficult have these problems made it for you to do your work, take care of your home and get along with others -       Learning Assessment:  Learning Assessment 9/25/2020   PRIMARY LEARNER Patient   HIGHEST LEVEL OF EDUCATION - PRIMARY LEARNER  SOME COLLEGE   BARRIERS PRIMARY LEARNER NONE   CO-LEARNER CAREGIVER No   PRIMARY LANGUAGE ENGLISH   LEARNER PREFERENCE PRIMARY LISTENING   ANSWERED BY patient   RELATIONSHIP SELF       Fall Risk  Fall Risk Assessment, last 12 mths 3/8/2021   Able to walk? Yes   Fall in past 12 months? 0       Health Maintenance reviewed and discussed and ordered per Provider. Health Maintenance Due   Topic Date Due    Hepatitis C Screening  Never done    COVID-19 Vaccine (1) Never done    DTaP/Tdap/Td series (1 - Tdap) Never done    Cervical cancer screen  Never done    Colorectal Cancer Screening Combo  Never done    Flu Vaccine (1) Never done   . Coordination of Care:  1. Have you been to the ER, urgent care clinic since your last visit? Hospitalized since your last visit? No      2.  Have you seen or consulted any other health care providers outside of the [address] [place] [address] since your last visit? Include any pap smears or colon screening.  No

## 2021-11-17 ENCOUNTER — HOSPITAL ENCOUNTER (OUTPATIENT)
Dept: PHYSICAL THERAPY | Age: 46
Discharge: HOME | End: 2021-11-17
Payer: MEDICAID

## 2021-11-17 PROCEDURE — 97110 THERAPEUTIC EXERCISES: CPT
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
## 2021-11-19 ENCOUNTER — APPOINTMENT (OUTPATIENT)
Dept: PHYSICAL THERAPY | Age: 46
End: 2021-11-19
Payer: MEDICAID

## 2021-11-24 ENCOUNTER — APPOINTMENT (OUTPATIENT)
Dept: PHYSICAL THERAPY | Age: 46
End: 2021-11-24
Payer: MEDICAID

## 2021-11-29 ENCOUNTER — TELEPHONE (OUTPATIENT)
Dept: FAMILY MEDICINE CLINIC | Age: 46
End: 2021-11-29

## 2021-11-29 DIAGNOSIS — G62.9 NEUROPATHY: ICD-10-CM

## 2021-11-29 DIAGNOSIS — M79.7 FIBROMYALGIA: ICD-10-CM

## 2021-11-29 RX ORDER — GABAPENTIN 300 MG/1
CAPSULE ORAL
Qty: 180 CAPSULE | Refills: 1 | Status: SHIPPED | OUTPATIENT
Start: 2021-11-29 | End: 2022-02-18

## 2021-11-29 NOTE — TELEPHONE ENCOUNTER
Pt also reported that when she has gone to her psychiatrist in [address], [place] been concerned about her BP.  10/27 it was 169/103 and 11/24 186/113. 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

## 2021-12-03 ENCOUNTER — TELEPHONE (OUTPATIENT)
Dept: FAMILY MEDICINE CLINIC | Age: 46
End: 2021-12-03

## 2021-12-03 ENCOUNTER — NURSE TRIAGE (OUTPATIENT)
Dept: OTHER | Facility: CLINIC | Age: 46
End: 2021-12-03

## 2021-12-03 NOTE — TELEPHONE ENCOUNTER
Received call from [address] at [facility] with Red Flag Complaint. Limited Triage due to patient not being present during call    Brief description of triage: High blood pressure    Triage indicates for patient to be seen in the office today. Care advice provided, patient verbalizes understanding; denies any other questions or concerns; instructed to call back for any new or worsening symptoms. Writer provided warm transfer to [place] at [facility] for appointment scheduling. Reason for Disposition   Systolic BP >= 560 OR Diastolic >= 886    Answer Assessment - Initial Assessment Questions  1. BLOOD PRESSURE: \"What is the blood pressure? \" \"Did you take at least two measurements 5 minutes apart?\"      165/114 at 0300 - left arm;  states he took it again and it was still high         2. ONSET: \"When did you take your blood pressure?\"      0300    3. HOW: \"How did you obtain the blood pressure? \" (e.g., visiting nurse, automatic home BP monitor)      Automatic home BP monitor    4. HISTORY: \"Do you have a history of high blood pressure? \"      Unsure    5. MEDICATIONS: [facility] you taking any medications for blood pressure? \" \"Have you missed any doses recently? \"      Denies    6. OTHER SYMPTOMS: \"Do you have any symptoms? \" (e.g., headache, chest pain, blurred vision, difficulty breathing, weakness)      Unable to assess, patient not present    7. PREGNANCY: \"Is there any chance you are pregnant? \" \"When was your last menstrual period? \"      No. LMP - about 3 weeks ago    Protocols used: HIGH BLOOD PRESSURE-ADULT-[place]    Attention Provider: Thank you for allowing me to participate in the care of your patient. The patient was connected to triage in response to information provided to the [facility].   Please do not respond through this encounter as the response is not directed to a shared pool

## 2021-12-03 NOTE — TELEPHONE ENCOUNTER
----- Message from [employer] sent at 12/3/2021 10:58 AM EST -----  Subject: Appointment Request    Reason for Call: Immediate Return from RN Triage    QUESTIONS  Type of Appointment? Established Patient  Reason for appointment request? No appointments available during search  Additional Information for Provider? Pt has /14. Pt was transferred   from [facility]. No available appt today. Pt was instructed to go to Urgent Care   if no appts.  states he will monitor BP before taking her to Urgent   Care. Please reach out to patients  - Acacia Saul  ---------------------------------------------------------------------------  --------------  Nicolas GARCIA  What is the best way for the office to contact you? OK to leave message on   voicemail  Preferred Call Back Phone Number? [phone]  ---------------------------------------------------------------------------  --------------  SCRIPT ANSWERS  Patient needs to be seen today? Yes   Nurse Name? [employer]  Have you been diagnosed with, awaiting test results for, or told that you   are suspected of having COVID-19 (Coronavirus)? (If patient has tested   negative or was tested as a requirement for work, school, or travel and   not based on symptoms, answer no)? No  Within the past two weeks have you developed any of the following symptoms   (answer no if symptoms have been present longer than 2 weeks or began   more than 2 weeks ago)? Fever or Chills, Cough, Shortness of breath or   difficulty breathing, Loss of taste or smell, Sore throat, Nasal   congestion, Sneezing or runny nose, Fatigue or generalized body aches   (answer no if pain is specific to a body part e.g. back pain), Diarrhea,   Headache? No  Have you had close contact with someone with COVID-19 in the last 14 days? No  (Service Expert  click yes below to proceed with [facility] As Usual   Scheduling)?  Yes

## 2021-12-03 NOTE — TELEPHONE ENCOUNTER
Pt aware that if BP stays elevated she needs to go to the ER due to provider being out of the office next week for training.

## 2021-12-06 ENCOUNTER — APPOINTMENT (OUTPATIENT)
Dept: PHYSICAL THERAPY | Age: 46
End: 2021-12-06
Payer: MEDICAID

## 2021-12-09 ENCOUNTER — APPOINTMENT (OUTPATIENT)
Dept: PHYSICAL THERAPY | Age: 46
End: 2021-12-09
Payer: MEDICAID

## 2021-12-14 ENCOUNTER — HOSPITAL ENCOUNTER (OUTPATIENT)
Dept: PHYSICAL THERAPY | Age: 46
Discharge: HOME | End: 2021-12-14
Payer: MEDICAID

## 2021-12-14 PROCEDURE — 97110 THERAPEUTIC EXERCISES: CPT

## 2021-12-16 ENCOUNTER — APPOINTMENT (OUTPATIENT)
Dept: PHYSICAL THERAPY | Age: 46
End: 2021-12-16
Payer: MEDICAID

## 2021-12-23 ENCOUNTER — TELEPHONE (OUTPATIENT)
Dept: FAMILY MEDICINE CLINIC | Age: 46
End: 2021-12-23

## 2021-12-23 DIAGNOSIS — R19.8 GI PROBLEM: Primary | ICD-10-CM
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
## 2021-12-30 ENCOUNTER — APPOINTMENT (OUTPATIENT)
Dept: PHYSICAL THERAPY | Age: 46
End: 2021-12-30
Payer: MEDICAID

## 2022-01-05 ENCOUNTER — HOSPITAL ENCOUNTER (OUTPATIENT)
Dept: PHYSICAL THERAPY | Age: 47
Discharge: HOME | End: 2022-01-05
Payer: MEDICAID

## 2022-01-05 PROCEDURE — 97164 PT RE-EVAL EST PLAN CARE: CPT

## 2022-01-05 PROCEDURE — 97110 THERAPEUTIC EXERCISES: CPT

## 2022-01-05 NOTE — PROGRESS NOTES
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 She reports compliance with her HEP 2x/day.   Pain Level (0-10 scale): 8/10  Any medication changes, allergies to medications, adverse drug reactions, diagnosis change, or new procedure performed?: [x] No    [] Yes (see summary sheet for update)        OBJECTIVE  Modality rationale: decrease edema, decrease inflammation, decrease pain and increase tissue extensibility to improve the patients ability to turn and look   Min Type Additional Details    [] Estim: []Att   []Unatt  []TENS instruct                 []IFC  []Premod []NMES                       []Other:  []w/US   []w/ice   []w/heat  Position:  Location:    []  Traction: [] Cervical       []Lumbar                       [] Prone          []Supine                       []Intermittent   []Continuous Lbs:  [] before manual  [] after manual    []  Ultrasound: []Continuous   [] Pulsed                           []1MHz   []3MHz Location:  W/cm2:        10 []  Ice     [x]  heat  []  Ice massage Position: seated  Location: cervical    []  Vasopneumatic Device Pressure: [] lo [] med [] hi   Temp: [] lo [] med [] hi   [] Skin assessment post-treatment:  []intact []redness- no adverse reaction       []redness - adverse reaction:       30 min Therapeutic Exercise:  [x] See flow sheet :   Rationale: increase ROM and increase strength to improve the patients ability to turn and look     min Therapeutic Activity:  []  See flow sheet :   Rationale:     15 min PT Re-evaluation        min Gait Training:  ___ feet with ___ device on level surfaces with ___ level of assist   Rationale: With TE Patient Education: [x] Review HEP    [] Progressed/Changed HEP based on:   [] positioning   [] body mechanics   [] transfers   [] heat/ice application          Pain Level (0-10 scale) post treatment: 7/10    ASSESSMENT/Changes in Function:   Session initiated with [facility] due to pt report of increased pain and stiffness today. Pt then performed seated self-stretching and PRE to improve ROM and strength. Pt participates in PT reassessment to determine pt progress in treatment. She has made good improvements in ROM and presents today with increased tightness in her upper traps, levator, and cervical periscapular musculature. Pt HEP was updated. Pt has been educated on her progress and the necessary modifications to her treatment to improve outcome. Pt educated on exercise dosing, AD use, HEP, and safety awareness. Patient will continue to benefit from skilled PT services to modify and progress therapeutic interventions, address functional mobility deficits, address ROM deficits, address strength deficits, analyze and address soft tissue restrictions, analyze and cue movement patterns, analyze and modify body mechanics/ergonomics and assess and modify postural abnormalities to attain remaining goals.      []  See Plan of Care  [x]  See progress note/recertification  []  See Discharge Summary             PLAN  [x]  Upgrade activities as tolerated     [x]  Continue plan of care  [x]  Update interventions per flow sheet       []  Discharge due to:_  []  Other:_      Jewell Bettencourt, PT, DPT 1/5/2022  10:52 AM

## 2022-01-05 NOTE — PROGRESS NOTES
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 N/A   []? Too acute   []? Other:  ROM AROM PROM Comments:pain, area   Forward flexion 60       Extension 46       SB right 30       SB left  40       Rotation right 45       Rotation left 55                                                                      AROM                     PROM                       MMT      Left Right Left Right Left Right   Shoulder Flexion [facility]     4/5 4/5     [facility]     4/5 4/5     Extension [facility]     5/5 5/5     IR WFL WFL     4/5 4/5     ER [facility]     4/5 4/5     Horizontal Add                 Horizontal Abd               Elbow Flexion [facility]     4/5 4/5     Extension [facility]     4/5 4/5     Prontation                 Supination               Wrist  Flexion                 Extension                 Ulnar deviation                 Radial deviation               Fingers Flexion                 Extension                 Abduction                 Adduction                     Objective Measures: FOTO: 45 / 48  NDI: 57     Short Term Goals:  1. Patient will report the knowledge of 3 exercises that can be used to help reduce symptoms to be able to ind reduce symptoms while at home. -MET 22  2. Pt will report pain < 4/10 when performing all functional activity in 3 weeks. -PROGRESSING  3. Patient will demonstrate increased gross cervical flexion/extension PROM of +10 deg to facilitate increased ability to perform daily activities. -MET 1/5/22  4. Patient will decrease NDI > 10 to facilitate increased ability to perform functional activities of choice. -PROGRESSING     Long Term Goals:  1. Patient will demonstrate increased gross cervical flexion/extension AROM of 20 deg or greater to be able to return to leisure activities of choice. -MET 1/5/22  2. Patient will demonstrate the ability to perform all functional activity with < 2/10 pain. -PROGRESSING  3. Patient will decrease NDI > 20 to facilitate increased ability to perform leisure activities of choice. -PROGRESSING    Key Functional Changes/Progress: Pt cervical ROM increased, BUE strength increased, decreased pain, and pt reports functional mobility improvements. Problem List: pain affecting function, decrease ROM, decrease strength, edema affecting function, decrease ADL/ functional abilitiies, decrease activity tolerance, decrease flexibility/ joint mobility and decrease transfer abilities     Updated Plan of Care:    Treatment Plan to include the following per provider discretion: Therapeutic exercise, Therapeutic activities, Neuromuscular re-education, Physical agent/modality, Gait/balance training, Manual therapy and Patient education    Frequency / Duration:  Patient to be seen   1-2   times per week for   6  weeks    Assessment/ Patient Update: Pt is making good progress toward her goals and has increased strength, increased ROM, decreased pain, and improved functional mobility. Pt HEP updated and pt has been educated on proper HEP dosing, DOMS, and safety awareness. Pt continues to exhibit deficits in ROM, strength, activity tolerance, and functional mobility. Pt will continue to benefit from skilled PT to address the above impairments.      Discharge planning: Pt to be DC upon goal completion or when progress ceases    If you have any questions/comments please contact us directly at [phone]. Thank you for allowing us to assist in the care of your patient. Therapist Signature: Nia Moore PT, DPT Date: 9/9/3983   Certification Period:  Reporting Period: 1/5/22 - 3/5/22  10/05/21 - 1/5/22 Time: 10:52 AM   NOTE TO PHYSICIAN:  PLEASE COMPLETE THE ORDERS BELOW AND FAX TO   [facility]: [phone]. If you are unable to process this request in 24 hours please contact our office: [phone].    ___ I have read the above report and request that my patient continue as recommended.   ___ I have read the above report and request that my patient continue therapy with the following changes/special instructions: ________________________________________________   ___ I have read the above report and request that my patient be discharged from therapy.      Physician Signature:        Date:       Time:

## 2022-01-18 ENCOUNTER — APPOINTMENT (OUTPATIENT)
Dept: PHYSICAL THERAPY | Age: 47
End: 2022-01-18

## 2022-01-19 ENCOUNTER — APPOINTMENT (OUTPATIENT)
Dept: PHYSICAL THERAPY | Age: 47
End: 2022-01-19

## 2022-01-26 ENCOUNTER — TELEPHONE (OUTPATIENT)
Dept: FAMILY MEDICINE CLINIC | Age: 47
End: 2022-01-26

## 2022-01-26 NOTE — TELEPHONE ENCOUNTER
----- Message from Arnalod Patterson sent at 1/25/2022  6:13 PM EST -----  Subject: Referral Request    QUESTIONS   Reason for referral request? Pt is requesting a referral to see a   different Gastroenterologist. She is having trouble getting into the   location in [place]. She would like [employer] to provide give her   recommendation for the specialist . Please call pt. at [phone] or   [phone]   Has the physician seen you for this condition before? No   Preferred Specialist (if applicable)? Do you already have an appointment scheduled? No  Additional Information for Provider?   ---------------------------------------------------------------------------  --------------  CALL BACK INFO  What is the best way for the office to contact you? Do not leave any   message, patient will call back for answer  Preferred Call Back Phone Number?  [phone]

## 2022-01-27 ENCOUNTER — TELEPHONE (OUTPATIENT)
Dept: FAMILY MEDICINE CLINIC | Age: 47
End: 2022-01-27

## 2022-01-27 NOTE — TELEPHONE ENCOUNTER
Unsure why she is having a difficult time getting in with Dr. Shari Cunningham can you determine the reason for this and get her set up with him or place referral for different GI thank you

## 2022-01-27 NOTE — TELEPHONE ENCOUNTER
----- Message from Jody La sent at 1/25/2022  6:13 PM EST -----  Subject: Referral Request    QUESTIONS   Reason for referral request? Pt is requesting a referral to see a   different Gastroenterologist. She is having trouble getting into the   location in [place]. She would like Tom Kearney to provide give her   recommendation for the specialist . Please call pt. at [phone] or   [phone]   Has the physician seen you for this condition before? No   Preferred Specialist (if applicable)? Do you already have an appointment scheduled? No  Additional Information for Provider?   ---------------------------------------------------------------------------  --------------  CALL BACK INFO  What is the best way for the office to contact you? Do not leave any   message, patient will call back for answer  Preferred Call Back Phone Number?  [phone]

## 2022-02-18 DIAGNOSIS — M79.7 FIBROMYALGIA: ICD-10-CM

## 2022-02-18 DIAGNOSIS — G62.9 NEUROPATHY: ICD-10-CM

## 2022-02-18 RX ORDER — GABAPENTIN 300 MG/1
CAPSULE ORAL
Qty: 180 CAPSULE | Refills: 2 | Status: SHIPPED | OUTPATIENT
Start: 2022-02-18 | End: 2022-06-16

## 2022-03-07 DIAGNOSIS — G62.9 NEUROPATHY: ICD-10-CM

## 2022-03-07 RX ORDER — ATORVASTATIN CALCIUM 40 MG/1
40 TABLET, FILM COATED ORAL DAILY
Qty: 90 TABLET | Refills: 0 | Status: SHIPPED | OUTPATIENT
Start: 2022-03-07

## 2022-03-07 RX ORDER — PREGABALIN 100 MG/1
CAPSULE ORAL
Qty: 180 CAPSULE | Refills: 2 | Status: SHIPPED | OUTPATIENT
Start: 2022-03-07

## 2022-03-10 ENCOUNTER — OFFICE VISIT (OUTPATIENT)
Dept: GASTROENTEROLOGY | Age: 47
End: 2022-03-10
Payer: MEDICAID

## 2022-03-10 VITALS
DIASTOLIC BLOOD PRESSURE: 80 MMHG | BODY MASS INDEX: 35.9 KG/M2 | SYSTOLIC BLOOD PRESSURE: 132 MMHG | WEIGHT: 190 LBS | HEART RATE: 63 BPM

## 2022-03-10 DIAGNOSIS — K92.89 GAS BLOAT SYNDROME: ICD-10-CM
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
## 2022-03-10 DIAGNOSIS — R11.0 NAUSEA: ICD-10-CM

## 2022-03-10 DIAGNOSIS — L40.50 PSORIATIC ARTHRITIS (HCC): ICD-10-CM

## 2022-03-10 DIAGNOSIS — K58.9 IRRITABLE BOWEL SYNDROME, UNSPECIFIED TYPE: ICD-10-CM

## 2022-03-10 DIAGNOSIS — R10.84 GENERALIZED ABDOMINAL PAIN: Primary | ICD-10-CM

## 2022-03-10 DIAGNOSIS — Z12.11 COLON CANCER SCREENING: ICD-10-CM

## 2022-03-10 PROCEDURE — 99204 OFFICE O/P NEW MOD 45 MIN: CPT | Performed by: INTERNAL MEDICINE

## 2022-03-10 RX ORDER — HYDROXYZINE HYDROCHLORIDE 10 MG/1
10 TABLET, FILM COATED ORAL
Qty: 30 TABLET | Refills: 3 | Status: SHIPPED | OUTPATIENT
Start: 2022-03-10 | End: 2022-10-14 | Stop reason: SDUPTHER
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
## 2022-03-10 NOTE — LETTER
3/10/2022    Patient: Gisela Vazquez   YOB: 1975   Date of Visit: 3/10/2022     Veda Levin NP  [address]  Via In Basket    Dear Veda Levin NP,      Thank you for referring Ms. Bentley Santos to [address] [place] [address] for evaluation. My notes for this consultation are attached. If you have questions, please do not hesitate to call me. I look forward to following your patient along with you.       Sincerely,    Ariana Yadav MD
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
## 2022-03-11 ENCOUNTER — HOSPITAL ENCOUNTER (OUTPATIENT)
Age: 47
Setting detail: OUTPATIENT SURGERY
End: 2022-03-11
Attending: INTERNAL MEDICINE | Admitting: INTERNAL MEDICINE
Payer: MEDICAID

## 2022-03-18 ENCOUNTER — HOSPITAL ENCOUNTER (OUTPATIENT)
Dept: PREADMISSION TESTING | Age: 47
Setting detail: OUTPATIENT SURGERY
Discharge: HOME | End: 2022-03-18
Payer: MEDICAID

## 2022-03-18 LAB — SARS-COV-2, COV2: NORMAL

## 2022-03-18 PROCEDURE — U0003 INFECTIOUS AGENT DETECTION BY NUCLEIC ACID (DNA OR RNA); SEVERE ACUTE RESPIRATORY SYNDROME CORONAVIRUS 2 (SARS-COV-2) (CORONAVIRUS DISEASE [COVID-19]), AMPLIFIED PROBE TECHNIQUE, MAKING USE OF HIGH THROUGHPUT TECHNOLOGIES AS DESCRIBED BY CMS-2020-01-R: HCPCS

## 2022-03-19 LAB — SARS-COV-2, COV2NT: NOT DETECTED

## 2022-03-21 RX ORDER — INSULIN LISPRO 100 [IU]/ML
INJECTION, SOLUTION INTRAVENOUS; SUBCUTANEOUS ONCE
Status: CANCELLED | OUTPATIENT
Start: 2022-03-21 | End: 2022-03-21

## 2022-03-21 RX ORDER — SODIUM CHLORIDE 0.9 % (FLUSH) 0.9 %
5-40 SYRINGE (ML) INJECTION AS NEEDED
Status: CANCELLED | OUTPATIENT
Start: 2022-03-21

## 2022-03-21 RX ORDER — SODIUM CHLORIDE, SODIUM LACTATE, POTASSIUM CHLORIDE, CALCIUM CHLORIDE 600; 310; 30; 20 MG/100ML; MG/100ML; MG/100ML; MG/100ML
25 INJECTION, SOLUTION INTRAVENOUS CONTINUOUS
Status: CANCELLED | OUTPATIENT
Start: 2022-03-21 | End: 2022-03-22

## 2022-03-21 RX ORDER — SODIUM CHLORIDE 0.9 % (FLUSH) 0.9 %
5-40 SYRINGE (ML) INJECTION EVERY 8 HOURS
Status: CANCELLED | OUTPATIENT
Start: 2022-03-21

## 2022-03-23 ENCOUNTER — ANESTHESIA EVENT (OUTPATIENT)
Dept: ENDOSCOPY | Age: 47
End: 2022-03-23
Payer: MEDICAID

## 2022-03-23 ENCOUNTER — ANESTHESIA (OUTPATIENT)
Dept: ENDOSCOPY | Age: 47
End: 2022-03-23
Payer: MEDICAID

## 2022-03-23 ENCOUNTER — HOSPITAL ENCOUNTER (OUTPATIENT)
Age: 47
Setting detail: OUTPATIENT SURGERY
Discharge: HOME | End: 2022-03-23
Attending: INTERNAL MEDICINE | Admitting: INTERNAL MEDICINE
Payer: MEDICAID

## 2022-03-23 VITALS
HEART RATE: 65 BPM | SYSTOLIC BLOOD PRESSURE: 150 MMHG | WEIGHT: 190 LBS | RESPIRATION RATE: 16 BRPM | BODY MASS INDEX: 35.9 KG/M2 | DIASTOLIC BLOOD PRESSURE: 82 MMHG | TEMPERATURE: 97.1 F | OXYGEN SATURATION: 96 %

## 2022-03-23 LAB — HCG UR QL: NEGATIVE

## 2022-03-23 PROCEDURE — 76060000031 HC ANESTHESIA FIRST 0.5 HR: Performed by: INTERNAL MEDICINE

## 2022-03-23 PROCEDURE — 77030037186 HC VLV ENDOSC STRL DEFENDO DISP MVAT -A: Performed by: INTERNAL MEDICINE

## 2022-03-23 PROCEDURE — 74011250636 HC RX REV CODE- 250/636: Performed by: NURSE ANESTHETIST, CERTIFIED REGISTERED

## 2022-03-23 PROCEDURE — 77030018831 HC SOL IRR H20 BAXT -A: Performed by: INTERNAL MEDICINE

## 2022-03-23 PROCEDURE — 45378 DIAGNOSTIC COLONOSCOPY: CPT | Performed by: INTERNAL MEDICINE

## 2022-03-23 PROCEDURE — 76040000019: Performed by: INTERNAL MEDICINE

## 2022-03-23 PROCEDURE — 81025 URINE PREGNANCY TEST: CPT

## 2022-03-23 PROCEDURE — 74011250636 HC RX REV CODE- 250/636: Performed by: INTERNAL MEDICINE

## 2022-03-23 PROCEDURE — 77030042138 HC TBNG SPECIAL -A: Performed by: INTERNAL MEDICINE

## 2022-03-23 PROCEDURE — 2709999900 HC NON-CHARGEABLE SUPPLY: Performed by: INTERNAL MEDICINE

## 2022-03-23 RX ORDER — SODIUM CHLORIDE 0.9 % (FLUSH) 0.9 %
5-40 SYRINGE (ML) INJECTION AS NEEDED
Status: DISCONTINUED | OUTPATIENT
Start: 2022-03-23 | End: 2022-03-23 | Stop reason: HOSPADM

## 2022-03-23 RX ORDER — SODIUM CHLORIDE 0.9 % (FLUSH) 0.9 %
5-40 SYRINGE (ML) INJECTION EVERY 8 HOURS
Status: DISCONTINUED | OUTPATIENT
Start: 2022-03-23 | End: 2022-03-23 | Stop reason: HOSPADM

## 2022-03-23 RX ORDER — ONDANSETRON 2 MG/ML
4 INJECTION INTRAMUSCULAR; INTRAVENOUS ONCE
Status: DISCONTINUED | OUTPATIENT
Start: 2022-03-23 | End: 2022-03-23 | Stop reason: HOSPADM

## 2022-03-23 RX ORDER — SODIUM CHLORIDE, SODIUM LACTATE, POTASSIUM CHLORIDE, CALCIUM CHLORIDE 600; 310; 30; 20 MG/100ML; MG/100ML; MG/100ML; MG/100ML
75 INJECTION, SOLUTION INTRAVENOUS CONTINUOUS
Status: DISCONTINUED | OUTPATIENT
Start: 2022-03-23 | End: 2022-03-23 | Stop reason: HOSPADM

## 2022-03-23 RX ORDER — SODIUM CHLORIDE, SODIUM LACTATE, POTASSIUM CHLORIDE, CALCIUM CHLORIDE 600; 310; 30; 20 MG/100ML; MG/100ML; MG/100ML; MG/100ML
INJECTION, SOLUTION INTRAVENOUS
Status: DISCONTINUED | OUTPATIENT
Start: 2022-03-23 | End: 2022-03-23 | Stop reason: HOSPADM

## 2022-03-23 RX ORDER — PROPOFOL 10 MG/ML
INJECTION, EMULSION INTRAVENOUS AS NEEDED
Status: DISCONTINUED | OUTPATIENT
Start: 2022-03-23 | End: 2022-03-23 | Stop reason: HOSPADM

## 2022-03-23 RX ADMIN — PROPOFOL 100 MG: 10 INJECTION, EMULSION INTRAVENOUS at 14:23

## 2022-03-23 RX ADMIN — SODIUM CHLORIDE, POTASSIUM CHLORIDE, SODIUM LACTATE AND CALCIUM CHLORIDE 75 ML/HR: 600; 310; 30; 20 INJECTION, SOLUTION INTRAVENOUS at 12:55

## 2022-03-23 RX ADMIN — PROPOFOL 100 MG: 10 INJECTION, EMULSION INTRAVENOUS at 14:18

## 2022-03-23 RX ADMIN — PROPOFOL 100 MG: 10 INJECTION, EMULSION INTRAVENOUS at 14:28

## 2022-03-23 RX ADMIN — SODIUM CHLORIDE, POTASSIUM CHLORIDE, SODIUM LACTATE AND CALCIUM CHLORIDE: 600; 310; 30; 20 INJECTION, SOLUTION INTRAVENOUS at 14:16

## 2022-03-23 NOTE — ANESTHESIA PREPROCEDURE EVALUATION
Relevant Problems   No relevant active problems       Anesthetic History   No history of anesthetic complications            Review of Systems / Medical History  Patient summary reviewed, nursing notes reviewed and pertinent labs reviewed    Pulmonary  Within defined limits                 Neuro/Psych   Within defined limits           Cardiovascular  Within defined limits                Exercise tolerance: >4 METS     GI/Hepatic/Renal  Within defined limits              Endo/Other  Within defined limits      Arthritis     Other Findings              Physical Exam    Airway  Mallampati: II  TM Distance: 4 - 6 cm  Neck ROM: normal range of motion   Mouth opening: Normal     Cardiovascular  Regular rate and rhythm,  S1 and S2 normal,  no murmur, click, rub, or gallop  Rhythm: regular  Rate: normal         Dental  No notable dental hx       Pulmonary  Breath sounds clear to auscultation               Abdominal  GI exam deferred       Other Findings            Anesthetic Plan    ASA: 2  Anesthesia type: MAC          Induction: Intravenous  Anesthetic plan and risks discussed with: Patient

## 2022-03-23 NOTE — PROCEDURES
Colonoscopy procedure note    Date of service: 3/23/2022    Type: Screening    Indication for procedure: Colon cancer screening    Anesthesia classification: ASA class 2    Patient history and physical been accomplished and documented. Patient is assessed and determined to be appropriate candidate for planned procedure and sedation; patient reassessed immediately prior to sedation. Sedation plan: MAC per anesthesia    Surgical assistant: Not applicable    Airway assessment: Range of motion: Normal, mouth opening, Visual obstruction: No.    UPDATED PREOP EXAM:  Unchanged. VS: Reviewed  Gen: in NAD  CV: RRR, no murmur  Resp: CTA  Abd: Soft, NTND, +BS  Extrem: No cyanosis or edema  Neuro: Awake and alert    Informed consent obtained: Yes. The indications, risks including but not limited to bleeding, perforation, infection, death, and potential failure to visual areas are diagnosed neoplasia, alternatives and benefits were discussed with the patient prior to the procedure. Patient identity and procedure was verified, absent was obtained, and is consistent with the consent form found in the patient's records. PROCEDURE PERFORMED:  COLONOSCOPY  to the cecum with MAC     INSTRUMENT: Olympus colonoscope per nursing notes. FINDINGS:    External anal lesions: Normal   Rectum: normal.   Retroflexion view: Grade 1 internal hemorrhoids. Sigmoid: normal set for mild diverticulosis. Descending Colon: normal   Transverse Colon: normal   Ascending Colon: normal   Cecum: normal   Terminal ileum: not evaluated     Specimens: None    Bowel preparation- adequate to detect small (5mm) polyps or larger. Estimated blood loss: none   Complications:  none   Cecal withdrawal time: 6 minutes. Comments: Patient has not yet had her CT scan of the abdomen/pelvis done    Impression:  1. Mild sigmoid diverticulosis. 2. Grade 1 internal hemorrhoids. 3. No polyps or masses were seen.   No \"kink\" noted in the large intestine as per her history. 4. Given the lack of findings above, I feel her symptoms are most likely due to irritable bowel syndrome. Recommendations:  1. Check pathology. Will notify patient with results when they are available. 2.   Repeat colonoscopy in  10 years for screening. 3.   Follow up as needed.

## 2022-03-23 NOTE — INTERVAL H&P NOTE
Update History & Physical    The Patient's History and Physical of March 23, 2022  The procedure was reviewed with the patient and I examined the patient. There was no change. The surgical site was confirmed by the patient and me. Plan:  The risk, benefits, expected outcome, and alternative to the recommended procedure have been discussed with the patient. Patient understands and wants to proceed with the procedure.     Electronically signed by Maya Rawls MD on 3/23/2022 at 12:53 PM

## 2022-06-16 DIAGNOSIS — G62.9 NEUROPATHY: ICD-10-CM

## 2022-06-16 DIAGNOSIS — M79.7 FIBROMYALGIA: ICD-10-CM

## 2022-06-16 RX ORDER — GABAPENTIN 300 MG/1
CAPSULE ORAL
Qty: 180 CAPSULE | Refills: 2 | Status: SHIPPED | OUTPATIENT
Start: 2022-06-16

## 2022-06-28 ENCOUNTER — TRANSCRIBE ORDER (OUTPATIENT)
Dept: REGISTRATION | Age: 47
End: 2022-06-28

## 2022-06-28 DIAGNOSIS — R10.84 ABDOMINAL PAIN, GENERALIZED: Primary | ICD-10-CM

## 2022-06-29 ENCOUNTER — HOSPITAL ENCOUNTER (OUTPATIENT)
Dept: LAB | Age: 47
Discharge: HOME | End: 2022-06-29
Attending: INTERNAL MEDICINE
Payer: MEDICAID

## 2022-06-29 ENCOUNTER — HOSPITAL ENCOUNTER (OUTPATIENT)
Dept: CT IMAGING | Age: 47
Discharge: HOME | End: 2022-06-29
Attending: INTERNAL MEDICINE
Payer: MEDICAID

## 2022-06-29 DIAGNOSIS — R10.84 ABDOMINAL PAIN, GENERALIZED: ICD-10-CM

## 2022-06-29 DIAGNOSIS — R10.84 GENERALIZED ABDOMINAL PAIN: ICD-10-CM

## 2022-06-29 LAB — CREAT SERPL-MCNC: 0.78 MG/DL (ref 0.6–1.3)

## 2022-06-29 PROCEDURE — 74011000636 HC RX REV CODE- 636: Performed by: INTERNAL MEDICINE

## 2022-06-29 PROCEDURE — 74178 CT ABD&PLV WO CNTR FLWD CNTR: CPT

## 2022-06-29 PROCEDURE — 36415 COLL VENOUS BLD VENIPUNCTURE: CPT

## 2022-06-29 PROCEDURE — 82565 ASSAY OF CREATININE: CPT

## 2022-06-29 PROCEDURE — 74011000250 HC RX REV CODE- 250: Performed by: INTERNAL MEDICINE

## 2022-06-29 RX ORDER — SODIUM CHLORIDE 0.9 % (FLUSH) 0.9 %
5-10 SYRINGE (ML) INJECTION
Status: COMPLETED | OUTPATIENT
Start: 2022-06-29 | End: 2022-06-29

## 2022-06-29 RX ORDER — BARIUM SULFATE 20 MG/ML
450 SUSPENSION ORAL
Status: COMPLETED | OUTPATIENT
Start: 2022-06-29 | End: 2022-06-29

## 2022-06-29 RX ADMIN — IOPAMIDOL 93 ML: 755 INJECTION, SOLUTION INTRAVENOUS at 11:10

## 2022-06-29 RX ADMIN — BARIUM SULFATE 450 ML: 20 SUSPENSION ORAL at 08:30

## 2022-06-29 RX ADMIN — Medication 10 ML: at 11:02

## 2022-06-29 NOTE — PROGRESS NOTES
CT scan of abdomen/pelvis results:    IMPRESSION     No acute intra-abdominal abnormality.     Hepatic steatosis, which is fatty liver. Diverticulosis. No abnormality of concern to explain the patient's symptoms or of concern. Please notify patient of result.

## 2022-08-02 ENCOUNTER — OFFICE VISIT (OUTPATIENT)
Dept: BEHAVIORAL/MENTAL HEALTH CLINIC | Age: 47
End: 2022-08-02
Payer: MEDICAID

## 2022-08-02 VITALS — WEIGHT: 200.2 LBS | BODY MASS INDEX: 37.83 KG/M2

## 2022-08-02 DIAGNOSIS — F31.9 BIPOLAR DEPRESSION (HCC): Primary | ICD-10-CM

## 2022-08-02 PROCEDURE — 90792 PSYCH DIAG EVAL W/MED SRVCS: CPT | Performed by: NURSE PRACTITIONER

## 2022-08-02 RX ORDER — VILAZODONE HYDROCHLORIDE 40 MG/1
40 TABLET ORAL DAILY
COMMUNITY
Start: 2022-07-30 | End: 2022-09-06 | Stop reason: DRUGHIGH

## 2022-08-02 RX ORDER — CLONAZEPAM 1 MG/1
1 TABLET ORAL
COMMUNITY
Start: 2022-07-23 | End: 2022-09-06 | Stop reason: SDUPTHER

## 2022-08-02 RX ORDER — LURASIDONE HYDROCHLORIDE 60 MG/1
60 TABLET, FILM COATED ORAL
COMMUNITY
Start: 2022-07-30 | End: 2022-08-02 | Stop reason: DRUGHIGH

## 2022-08-02 RX ORDER — BUPROPION HYDROCHLORIDE 300 MG/1
300 TABLET ORAL DAILY
COMMUNITY
Start: 2022-07-20 | End: 2022-09-06 | Stop reason: SDUPTHER

## 2022-08-02 NOTE — PROGRESS NOTES
Jaun Hunter is a 51 y.o. female who presents today for the following:  Chief Complaint   Patient presents with    Depression     PTSD    Medication Management     \"The antidepressant is making it worse. \"       Allergies   Allergen Reactions    Penicillins Hives       Current Outpatient Medications   Medication Sig    lurasidone (Latuda) 80 mg tab tablet Take 1 Tablet by mouth daily (with dinner) for 90 days. Viibryd 40 mg tab tablet Take 40 mg by mouth daily. buPROPion XL (WELLBUTRIN XL) 300 mg XL tablet Take 300 mg by mouth in the morning. clonazePAM (KlonoPIN) 1 mg tablet Take 1 mg by mouth two (2) times daily as needed. gabapentin (NEURONTIN) 300 mg capsule TAKE 2 CAPSULES BY MOUTH THREE TIMES DAILY    hydrOXYzine HCL (ATARAX) 10 mg tablet Take 1 Tablet by mouth every six (6) hours as needed for Anxiety (nausea, abdominal discomfort) for up to 120 doses. Indications: anxious, Nausea, abdominal discomfort    atorvastatin (LIPITOR) 40 mg tablet TAKE 1 TABLET BY MOUTH DAILY    pregabalin (LYRICA) 100 mg capsule TAKE 1 CAPSULE BY MOUTH TWICE DAILY. MAX DAILY AMOUNT: 200 MG    Bifidobacterium Infantis (Align) 4 mg cap TAKE 1 CAPSULE BY MOUTH DAILY    Xeljanz XR 11 mg Tb24     albuterol (PROVENTIL HFA, VENTOLIN HFA, PROAIR HFA) 90 mcg/actuation inhaler 2 puffs q4h prn sob     No current facility-administered medications for this visit.        Past Medical History:   Diagnosis Date    Fibromyalgia     IBS (irritable bowel syndrome)     Psoriasis     Psoriatic arthritis ([address]     Right hand pain        Past Surgical History:   Procedure Laterality Date    COLONOSCOPY N/A 3/23/2022    COLONOSCOPY performed by Dilma Mccracken MD at [facility] ENDOSCOPY    HX ORTHOPAEDIC      ankle    HX WISDOM TEETH EXTRACTION         Family History   Problem Relation Age of Onset    Depression Mother     Bipolar Disorder Mother     Depression Maternal Grandmother        Social History     Socioeconomic History    Marital status:      Spouse name: Not on file    Number of children: Not on file    Years of education: Not on file    Highest education level: Not on file   Occupational History    Not on file   Tobacco Use    Smoking status: Never    Smokeless tobacco: Never   Vaping Use    Vaping Use: Never used   Substance and Sexual Activity    Alcohol use: Yes     Comment: social    Drug use: Not Currently     Types: Marijuana     Comment: 6 times per month    Sexual activity: Not on file   Other Topics Concern    Not on file   Social History Narrative    Not on file     Social Determinants of Health     Financial Resource Strain: Not on file   Food Insecurity: Not on file   Transportation Needs: Not on file   Physical Activity: Not on file   Stress: Not on file   Social Connections: Not on file   Intimate Partner Violence: Not on file   Housing Stability: Not on file         Ms. Cassie Rucker is a 79-year-old   female with history of PTSD, bipolar depression, anxiety disorder and attention deficit disorder. She denies history of psychiatric hospitalization and suicide attempt. She follows up at [facility] psychiatry under the care of Dr. Kimberly Swann which she was last seen a couple of months ago. On today's visit, she is requesting medication review and adjustment as appropriate. Patient reports that she would like to be started on medication for attention deficit disorder. We discussed current medications and indications for use. She reports that her provider at [facility] recommended that she get a psychological evaluation for attention deficit disorder since she is an adult. Patient reports that she had childhood ADHD but it was untreated. Patient is encouraged to follow-up on psychological evaluation recommendation.   She is currently prescribed bupropion  mg take 1 tablet daily, clonazepam 1 mg take 1 tablet twice daily as needed for anxiety, Viibryd 40 mg take 1 tablet daily and Latuda 60 mg take 1 tablet daily with dinner for mood. Patient presents to clinic unaccompanied, clean fully alert and oriented. Mood is moderately depressed and anxious without suicidal/homicidal thinking, risk for suicide is low based on no history. She reports increasing depression and anxiety since taking Viibryd. She reports feeling withdrawn and having no motivation and lack of interest for the past few months. Patient also reports poor focus, attention and low energy. She mentions bupropion was increased about a month ago but she is still feeling depressed. She reports increased need for sleep. Appetite is stable. No psychotic symptoms observed or reported. Patient was reared by her mother and stepfather. She reports her father was not a part of her life based upon her mother's decision to keep her away from her biological father. Patient has 2 sisters with whom she has a good relationship. She has some college and is a homemaker. Patient has been  for the past 25 years and the couple have 2 children ages 25 and 13. She reports stable relationship with her family. Yarsanism-Nationalist.  No  or legal history noted. She reports marijuana use which helps with pain. No alcohol use reported. Patient lives at home with her  in a stable home environment. Review of Systems   Musculoskeletal:  Positive for myalgias. Psychiatric/Behavioral:  Positive for depression. The patient is nervous/anxious. All other systems reviewed and are negative. Visit Vitals  Wt 90.8 kg (200 lb 3.2 oz)   LMP 07/19/2022 (Approximate)   BMI 37.83 kg/m²     Physical Exam  Psychiatric:         Attention and Perception: Attention and perception normal.         Mood and Affect: Mood is depressed. Speech: Speech normal.         Behavior: Behavior normal. Behavior is cooperative. Thought Content:  Thought content normal.         Cognition and Memory: Cognition and memory normal.         Judgment: Judgment normal.          Plan:    Reduce Viibryd 40 mg to half tablet daily for 2 weeks then discontinue. Change Latuda to 80 mg take 1 tablet daily with dinner. She may continue all other medications as prescribed. Therapy is recommended. Follow-up with medical provider as appropriate. For emergencies, call 911 or go to the emergency department.

## 2022-09-06 ENCOUNTER — OFFICE VISIT (OUTPATIENT)
Dept: BEHAVIORAL/MENTAL HEALTH CLINIC | Age: 47
End: 2022-09-06
Payer: MEDICAID

## 2022-09-06 DIAGNOSIS — F33.1 MODERATE EPISODE OF RECURRENT MAJOR DEPRESSIVE DISORDER (HCC): Primary | ICD-10-CM

## 2022-09-06 DIAGNOSIS — F41.1 GENERALIZED ANXIETY DISORDER: ICD-10-CM

## 2022-09-06 DIAGNOSIS — F31.9 BIPOLAR DEPRESSION (HCC): ICD-10-CM

## 2022-09-06 PROCEDURE — 99214 OFFICE O/P EST MOD 30 MIN: CPT | Performed by: NURSE PRACTITIONER

## 2022-09-06 RX ORDER — BUPROPION HYDROCHLORIDE 300 MG/1
300 TABLET ORAL DAILY
Qty: 90 TABLET | Refills: 3 | Status: SHIPPED | OUTPATIENT
Start: 2022-09-06 | End: 2022-12-05

## 2022-09-06 RX ORDER — CLONAZEPAM 1 MG/1
1 TABLET ORAL
Qty: 60 TABLET | Refills: 5 | Status: SHIPPED | OUTPATIENT
Start: 2022-09-06 | End: 2022-11-05

## 2022-09-06 RX ORDER — VILAZODONE HYDROCHLORIDE 20 MG/1
20 TABLET ORAL DAILY
Qty: 30 TABLET | Refills: 1 | Status: SHIPPED | OUTPATIENT
Start: 2022-09-06 | End: 2022-10-06

## 2022-09-06 NOTE — PROGRESS NOTES
Rito Cantu is a 51 y.o. female who presents today for the following:  Chief Complaint   Patient presents with    Follow-up    Depression     \"I think I need to go back on the [address], it helped. \"    Bipolar    Medication Management         Allergies   Allergen Reactions    Penicillins Hives       Current Outpatient Medications   Medication Sig    buPROPion XL (WELLBUTRIN XL) 300 mg XL tablet Take 300 mg by mouth in the morning. clonazePAM (KlonoPIN) 1 mg tablet Take 1 mg by mouth two (2) times daily as needed. lurasidone (Latuda) 80 mg tab tablet Take 1 Tablet by mouth daily (with dinner) for 90 days. gabapentin (NEURONTIN) 300 mg capsule TAKE 2 CAPSULES BY MOUTH THREE TIMES DAILY    hydrOXYzine HCL (ATARAX) 10 mg tablet Take 1 Tablet by mouth every six (6) hours as needed for Anxiety (nausea, abdominal discomfort) for up to 120 doses. Indications: anxious, Nausea, abdominal discomfort    atorvastatin (LIPITOR) 40 mg tablet TAKE 1 TABLET BY MOUTH DAILY    pregabalin (LYRICA) 100 mg capsule TAKE 1 CAPSULE BY MOUTH TWICE DAILY. MAX DAILY AMOUNT: 200 MG    Bifidobacterium Infantis (Align) 4 mg cap TAKE 1 CAPSULE BY MOUTH DAILY    Xeljanz XR 11 mg Tb24     albuterol (PROVENTIL HFA, VENTOLIN HFA, PROAIR HFA) 90 mcg/actuation inhaler 2 puffs q4h prn sob     No current facility-administered medications for this visit.        Past Medical History:   Diagnosis Date    Fibromyalgia     IBS (irritable bowel syndrome)     Psoriasis     Psoriatic arthritis ([address]     Right hand pain        Past Surgical History:   Procedure Laterality Date    COLONOSCOPY N/A 3/23/2022    COLONOSCOPY performed by Azucena Ross MD at [facility] ENDOSCOPY    HX ORTHOPAEDIC      ankle    HX WISDOM TEETH EXTRACTION         Family History   Problem Relation Age of Onset    Depression Mother     Bipolar Disorder Mother     Depression Maternal Grandmother        Social History     Socioeconomic History    Marital status:      Spouse name: Not on file    Number of children: Not on file    Years of education: Not on file    Highest education level: Not on file   Occupational History    Not on file   Tobacco Use    Smoking status: Never    Smokeless tobacco: Never   Vaping Use    Vaping Use: Never used   Substance and Sexual Activity    Alcohol use: Yes     Comment: social    Drug use: Not Currently     Types: Marijuana     Comment: 6 times per month    Sexual activity: Not on file   Other Topics Concern    Not on file   Social History Narrative    Not on file     Social Determinants of Health     Financial Resource Strain: Not on file   Food Insecurity: Not on file   Transportation Needs: Not on file   Physical Activity: Not on file   Stress: Not on file   Social Connections: Not on file   Intimate Partner Violence: Not on file   Housing Stability: Not on file         Ms. Dale Mcgarry follows up in the clinic for bipolar depression, PTSD, anxiety disorder, and attention deficit disorder. Patient last followed up in the clinic August 2, 2022. She is currently prescribed bupropion  mg take 1 tablet daily, clonazepam 1 mg take 1 tablet twice daily as needed for anxiety, and Latuda 80 mg take 1 tablet daily with food. It is noted patient reports that she never started Latuda 80 mg 1 tablet daily, so she is still taking 60 mg tablet. Patient reports since coming off of Viibryd, she feels more depressed. On today's visit, she is requesting to go back on Viibryd. She is requesting refills, pending finding a psychiatric provider. Patient presents to the clinic accompanied by her  who sat in the lobby during her visit. She is clean, fully alert, and oriented. Gait stable. Mood is mild to moderately depressed without suicidal/homicidal thinking, risk for suicide is low, protective factor-family. She denies psychotic symptoms. She reports mood swings, fluctuating sleep and appetite. She shows no signs of EPS or TD.   She is requesting medication refills and she plans to contact her insurance company for a list of providers. Patient lives in the home with her  in a stable home environment. Review of Systems   Psychiatric/Behavioral:  Positive for depression. All other systems reviewed and are negative. There were no vitals taken for this visit. Physical Exam  Psychiatric:         Attention and Perception: Attention and perception normal.         Mood and Affect: Mood is depressed. Speech: Speech normal.         Behavior: Behavior normal. Behavior is cooperative. Thought Content: Thought content normal.         Cognition and Memory: Cognition and memory normal.         Judgment: Judgment normal.      Plan:    Change Latuda to 80 mg 1 tablet daily with food. Restart Viibryd 20 mg take 1 tablet daily. Patient will contact clinic for any issues. She is advised to contact the clinic within the next 2 weeks. She may continue bupropion and clonazepam as prescribed. Therapy is recommended. Follow-up with medical provider as appropriate. For emergencies, call 911 or go to the emergency department.

## 2022-09-20 ENCOUNTER — TELEPHONE (OUTPATIENT)
Dept: BEHAVIORAL/MENTAL HEALTH CLINIC | Age: 47
End: 2022-09-20

## 2022-09-22 ENCOUNTER — TELEPHONE (OUTPATIENT)
Dept: BEHAVIORAL/MENTAL HEALTH CLINIC | Age: 47
End: 2022-09-22

## 2022-09-23 ENCOUNTER — TELEPHONE (OUTPATIENT)
Dept: BEHAVIORAL/MENTAL HEALTH CLINIC | Age: 47
End: 2022-09-23

## 2022-09-23 ENCOUNTER — TELEPHONE (OUTPATIENT)
Dept: FAMILY MEDICINE CLINIC | Age: 47
End: 2022-09-23

## 2022-09-23 DIAGNOSIS — F31.9 BIPOLAR DEPRESSION (HCC): ICD-10-CM

## 2022-09-23 NOTE — TELEPHONE ENCOUNTER
Patient is requesting Latuda increase for mood stability. She reports having mood swings and feeling depressed. She is aware that I will be leaving the practice next week, so she plans to call next week to report progress and follow up with her new provider as appropriate. Change Latuda to 120 mg 1 tab with dinner for mood. She will call the clinic for any issues.

## 2022-09-23 NOTE — TELEPHONE ENCOUNTER
Pt called on Friday about her having high Bps when she goes to other doctors appts. She feels that she needs to be on BP meds. She was wondering if she needed an earlier appt before her wellness on 11/8.

## 2022-09-27 NOTE — TELEPHONE ENCOUNTER
She can move her appt up if we have anything sooner that's on a day less than 20 people since the appt is for a wellness physical

## 2022-10-14 ENCOUNTER — OFFICE VISIT (OUTPATIENT)
Dept: FAMILY MEDICINE CLINIC | Age: 47
End: 2022-10-14
Payer: MEDICAID

## 2022-10-14 VITALS
RESPIRATION RATE: 19 BRPM | WEIGHT: 206 LBS | OXYGEN SATURATION: 97 % | BODY MASS INDEX: 38.89 KG/M2 | TEMPERATURE: 97.7 F | HEART RATE: 71 BPM | SYSTOLIC BLOOD PRESSURE: 150 MMHG | DIASTOLIC BLOOD PRESSURE: 104 MMHG | HEIGHT: 61 IN

## 2022-10-14 DIAGNOSIS — Z00.00 WELLNESS EXAMINATION: ICD-10-CM

## 2022-10-14 DIAGNOSIS — R11.0 NAUSEA: ICD-10-CM

## 2022-10-14 DIAGNOSIS — L40.50 PSORIATIC ARTHRITIS (HCC): ICD-10-CM

## 2022-10-14 DIAGNOSIS — E55.9 VITAMIN D DEFICIENCY: ICD-10-CM

## 2022-10-14 DIAGNOSIS — L40.9 PSORIASIS: ICD-10-CM

## 2022-10-14 DIAGNOSIS — E53.8 VITAMIN B12 DEFICIENCY: Primary | ICD-10-CM

## 2022-10-14 DIAGNOSIS — F41.8 MIXED ANXIETY AND DEPRESSIVE DISORDER: ICD-10-CM

## 2022-10-14 PROCEDURE — 99214 OFFICE O/P EST MOD 30 MIN: CPT | Performed by: NURSE PRACTITIONER

## 2022-10-14 RX ORDER — VILAZODONE HYDROCHLORIDE 20 MG/1
TABLET ORAL
COMMUNITY
Start: 2022-10-07

## 2022-10-14 RX ORDER — HYDROXYZINE HYDROCHLORIDE 10 MG/1
10 TABLET, FILM COATED ORAL
Qty: 30 TABLET | Refills: 3 | Status: SHIPPED | OUTPATIENT
Start: 2022-10-14

## 2022-10-14 RX ORDER — PREDNISONE 5 MG/1
TABLET ORAL
COMMUNITY
Start: 2022-10-08

## 2022-10-14 RX ORDER — GUSELKUMAB 100 MG/ML
INJECTION SUBCUTANEOUS
COMMUNITY
Start: 2022-10-03

## 2022-10-14 RX ORDER — LISINOPRIL AND HYDROCHLOROTHIAZIDE 12.5; 2 MG/1; MG/1
1 TABLET ORAL DAILY
Qty: 90 TABLET | Refills: 0 | Status: SHIPPED | OUTPATIENT
Start: 2022-10-14

## 2022-10-14 NOTE — PROGRESS NOTES
Soo Minor is a 51 y. o.female presents with   Chief Complaint   Patient presents with    Physical       Patient presents today in office for annual adult preventive exam.  She has history significant for insomnia as well as mixed anxiety and depression followed by psych; however, she states her psych recently left the area. She also has psoriasis with psoriatic arthritis. She has history of elevated blood pressure without diagnosis of hypertension. Blood pressure significantly elevated today and she states it has been over the past several weeks. Subjective:           Past Medical History:   Diagnosis Date    Fibromyalgia     IBS (irritable bowel syndrome)     Psoriasis     Psoriatic arthritis ([address]     Right hand pain      Past Surgical History:   Procedure Laterality Date    COLONOSCOPY N/A 3/23/2022    COLONOSCOPY performed by Josue Thacker MD at [facility] ENDOSCOPY    HX ORTHOPAEDIC      ankle    HX WISDOM TEETH EXTRACTION       Social History     Socioeconomic History    Marital status:    Tobacco Use    Smoking status: Never    Smokeless tobacco: Never   Vaping Use    Vaping Use: Never used   Substance and Sexual Activity    Alcohol use: Yes     Comment: social    Drug use: Not Currently     Types: Marijuana     Comment: 6 times per month     Current Outpatient Medications   Medication Sig Dispense Refill    Viibryd 20 mg tab tablet TAKE 1 TABLET BY MOUTH ONCE DAILY FOR 30 DAYS      predniSONE (DELTASONE) 5 mg tablet TAKE 4 TABLETS BY MOUTH ONCE DAILY FOR 5 DAYS THEN TAKE 3 TABLETS FOR 5 DAYS, THEN TAKE 2 TABLETS FOR 5 DAYS, THEN 1 TABLET DAILY FOR 5 DAYS      Tremfya 100 mg/mL atIn       hydrOXYzine HCL (ATARAX) 10 mg tablet Take 1 Tablet by mouth every six (6) hours as needed for Anxiety (nausea, abdominal discomfort) for up to 120 doses.  Indications: anxious, Nausea, abdominal discomfort 30 Tablet 3    lisinopril-hydroCHLOROthiazide (PRINZIDE, ZESTORETIC) 20-12.5 mg per tablet Take 1 Tablet by mouth daily. 90 Tablet 0    lurasidone (Latuda) 120 mg tab tablet Take 1 Tablet by mouth daily (with breakfast). 90 Tablet 1    lurasidone (Latuda) 120 mg tab tablet Take 1 Tablet by mouth daily (with dinner) for 90 days. 90 Tablet 3    clonazePAM (KlonoPIN) 1 mg tablet Take 1 Tablet by mouth two (2) times daily as needed for Anxiety for up to 60 days. Max Daily Amount: 2 mg. 60 Tablet 5    gabapentin (NEURONTIN) 300 mg capsule TAKE 2 CAPSULES BY MOUTH THREE TIMES DAILY 180 Capsule 2    pregabalin (LYRICA) 100 mg capsule TAKE 1 CAPSULE BY MOUTH TWICE DAILY. MAX DAILY AMOUNT: 200  Capsule 2    Xeljanz XR 11 mg Tb24       albuterol (PROVENTIL HFA, VENTOLIN HFA, PROAIR HFA) 90 mcg/actuation inhaler 2 puffs q4h prn sob 1 Inhaler 2    buPROPion XL (WELLBUTRIN XL) 300 mg XL tablet Take 1 Tablet by mouth daily for 90 days. (Patient not taking: Reported on 10/14/2022) 90 Tablet 3    atorvastatin (LIPITOR) 40 mg tablet TAKE 1 TABLET BY MOUTH DAILY (Patient not taking: Reported on 10/14/2022) 90 Tablet 0    Bifidobacterium Infantis (Align) 4 mg cap TAKE 1 CAPSULE BY MOUTH DAILY (Patient not taking: Reported on 10/14/2022) 90 Capsule 2     Allergies   Allergen Reactions    Penicillins Hives     The patient has a family history of    REVIEW OF SYSTEMS  Review of Systems   Constitutional:  Negative for chills and fever. Respiratory:  Negative for cough and shortness of breath. Cardiovascular:  Negative for chest pain and leg swelling. Musculoskeletal:  Positive for joint pain. Neurological:  Positive for headaches. Negative for dizziness. Psychiatric/Behavioral:  Negative for depression and suicidal ideas. The patient is not nervous/anxious.       Objective:     Visit Vitals  BP (!) 150/104 (BP 1 Location: Right upper arm, BP Patient Position: Sitting, BP Cuff Size: Large adult)   Pulse 71   Temp 97.7 °F (36.5 °C) (Temporal)   Resp 19   Ht 5' 1\" (1.549 m)   Wt 206 lb (93.4 kg)   SpO2 97%   BMI 38.92 kg/m²       Current Outpatient Medications   Medication Instructions    albuterol (PROVENTIL HFA, VENTOLIN HFA, PROAIR HFA) 90 mcg/actuation inhaler 2 puffs q4h prn sob    atorvastatin (LIPITOR) 40 mg, Oral, DAILY    Bifidobacterium Infantis (Align) 4 mg cap TAKE 1 CAPSULE BY MOUTH DAILY    buPROPion XL (WELLBUTRIN XL) 300 mg, Oral, DAILY    clonazePAM (KLONOPIN) 1 mg, Oral, 2 TIMES DAILY AS NEEDED    gabapentin (NEURONTIN) 300 mg capsule TAKE 2 CAPSULES BY MOUTH THREE TIMES DAILY    hydrOXYzine HCL (ATARAX) 10 mg, Oral, EVERY 6 HOURS AS NEEDED    lisinopril-hydroCHLOROthiazide (PRINZIDE, ZESTORETIC) 20-12.5 mg per tablet 1 Tablet, Oral, DAILY    lurasidone (LATUDA) 120 mg, Oral, DAILY WITH DINNER    lurasidone (LATUDA) 120 mg, Oral, DAILY WITH BREAKFAST    predniSONE (DELTASONE) 5 mg tablet TAKE 4 TABLETS BY MOUTH ONCE DAILY FOR 5 DAYS THEN TAKE 3 TABLETS FOR 5 DAYS, THEN TAKE 2 TABLETS FOR 5 DAYS, THEN 1 TABLET DAILY FOR 5 DAYS    pregabalin (LYRICA) 100 mg capsule TAKE 1 CAPSULE BY MOUTH TWICE DAILY. MAX DAILY AMOUNT: 200 MG    Tremfya 100 mg/mL atIn No dose, route, or frequency recorded. Viibryd 20 mg tab tablet TAKE 1 TABLET BY MOUTH ONCE DAILY FOR 30 DAYS    Xeljanz XR 11 mg Tb24 No dose, route, or frequency recorded. PHYSICAL EXAM  Physical Exam  Constitutional:       Appearance: Normal appearance. Cardiovascular:      Heart sounds: Normal heart sounds. Pulmonary:      Breath sounds: Normal breath sounds. Musculoskeletal:      Right lower leg: No edema. Left lower leg: No edema. Skin:     General: Skin is warm and dry. Neurological:      Mental Status: She is alert and oriented to person, place, and time. Psychiatric:         Behavior: Behavior normal.       Assessment/Plan:     Diagnoses and all orders for this visit:    1. Vitamin B12 deficiency    2.  Mixed anxiety and depressive disorder  -     REFERRAL TO PSYCHIATRY  I relayed to the patient that I would manage her Latuda until she gets in with psych. Provided her with a referral she is to call make appointment immediately. Patient verbalized understanding. 3. Vitamin D deficiency    4. Wellness examination  -     CBC W/O DIFF  -     METABOLIC PANEL, COMPREHENSIVE  -     LIPID PANEL  -     VITAMIN D, 25 HYDROXY  -     VITAMIN B12  -     TSH 3RD GENERATION  -     HEMOGLOBIN A1C W/O EAG  -     HEPATITIS C AB  -Labs today any changes to current treatment contingent upon those findings    5. Psoriasis  Keep all fu with rheumatology    6. Psoriatic arthritis ([facility] [address]  Keep all fu with rheumatology    7. Nausea  -     hydrOXYzine HCL (ATARAX) 10 mg tablet; Take 1 Tablet by mouth every six (6) hours as needed for Anxiety (nausea, abdominal discomfort) for up to 120 doses. Indications: anxious, Nausea, abdominal discomfort  8. Essential HTN  I am starting patient on lisinopril HCTZ 20/12.5 mg once daily. Instructed her to check nightly blood pressures document bring blood pressures and cuff to 2-week nurse visit. Patient verbalized understanding. Other orders  -     lisinopril-hydroCHLOROthiazide (PRINZIDE, ZESTORETIC) 20-12.5 mg per tablet; Take 1 Tablet by mouth daily. -     lurasidone (Latuda) 120 mg tab tablet; Take 1 Tablet by mouth daily (with breakfast). Follow-up and Dispositions    Return in about 2 weeks (around 10/28/2022) for NURSE VISIT BP. Disclaimer:    I have discussed the diagnosis with the patient and the intended plan as seen above. The patient understands our medical plan. The risks, benefits and significant side effects of all medications have been reviewed. Anticipated time course and progression of condition reviewed. All questions have been addressed. She received an after visit summary, with information reviewed, and questions answered.       Where appropriate, she is instructed to call the clinic if she has not been notified either by phone or through [address] with the results of her tests or with an appointment plan for any referrals within 1 week(s). The patient  is to call if her condition worsens or fails to improve or if significant side effects are experienced.        Ana Aiken NP

## 2022-10-14 NOTE — PROGRESS NOTES
Kayley Palmer presents today for   Chief Complaint   Patient presents with    Physical       Is someone accompanying this pt? NO    Is the patient using any DME equipment during OV? No    Depression Screening:  3 most recent PHQ Screens 10/14/2022   PHQ Not Done -   Little interest or pleasure in doing things Nearly every day   Feeling down, depressed, irritable, or hopeless Nearly every day   Total Score PHQ 2 6   Trouble falling or staying asleep, or sleeping too much More than half the days   Feeling tired or having little energy Nearly every day   Poor appetite, weight loss, or overeating Nearly every day   Feeling bad about yourself - or that you are a failure or have let yourself or your family down Nearly every day   Trouble concentrating on things such as school, work, reading, or watching TV Nearly every day   Moving or speaking so slowly that other people could have noticed; or the opposite being so fidgety that others notice Not at all   Thoughts of being better off dead, or hurting yourself in some way Not at all   PHQ 9 Score 20   How difficult have these problems made it for you to do your work, take care of your home and get along with others Extremely difficult       Learning Assessment:  Learning Assessment 9/25/2020   PRIMARY LEARNER Patient   HIGHEST LEVEL OF EDUCATION - PRIMARY LEARNER  SOME COLLEGE   BARRIERS PRIMARY LEARNER NONE   CO-LEARNER CAREGIVER No   PRIMARY LANGUAGE ENGLISH   LEARNER PREFERENCE PRIMARY LISTENING   ANSWERED BY patient   RELATIONSHIP SELF       Fall Risk  Fall Risk Assessment, last 12 mths 3/8/2021   Able to walk? Yes   Fall in past 12 months? 0       Health Maintenance reviewed and discussed and ordered per Provider.     Health Maintenance Due   Topic Date Due    Hepatitis C Screening  Never done    COVID-19 Vaccine (1) Never done    DTaP/Tdap/Td series (1 - Tdap) Never done    Cervical cancer screen  Never done    Lipid Screen  06/08/2022    Flu Vaccine (1) Never done .      Coordination of Care:    1. Have you been to the ER, urgent care clinic since your last visit? Hospitalized since your last visit? No    2. Have you seen or consulted any other health care providers outside of the [address] [place] [address] since your last visit? Include any pap smears or colon screening. NO    3. For patients aged 39-70: Has the patient had a colonoscopy / FIT/ Cologuard? Yes - no Care Gap present      If the patient is female:    4. For patients aged 41-77: Has the patient had a mammogram within the past 2 years? Yes - no Care Gap present      5. For patients aged 21-65: Has the patient had a pap smear?  No

## 2022-10-27 ENCOUNTER — TELEPHONE (OUTPATIENT)
Dept: FAMILY MEDICINE CLINIC | Age: 47
End: 2022-10-27

## 2022-10-27 NOTE — TELEPHONE ENCOUNTER
Appt scheduled. ----- Message from Lynda Anton sent at 10/27/2022 12:11 PM EDT -----  Subject: Appointment Request    Reason for Call: Established Patient Appointment needed: Routine Existing   Condition Follow Up    QUESTIONS    Reason for appointment request? Available appointments did not meet   patient need     Additional Information for Provider? Pt states she was supposed to come in   for a blood pressure check today she started coughing and sneezing today   and doesn't want to bring it into the office. ---------------------------------------------------------------------------  --------------  Maribell MCKNIGHT  [phone];  Do not leave any message, patient will call back for answer  ---------------------------------------------------------------------------  --------------  SCRIPT ANSWERS  COVID Screen: Keiko Mcclain

## 2022-11-17 DIAGNOSIS — G62.9 NEUROPATHY: ICD-10-CM

## 2022-11-21 RX ORDER — PREGABALIN 100 MG/1
CAPSULE ORAL
Qty: 60 CAPSULE | Refills: 0 | Status: SHIPPED | OUTPATIENT
Start: 2022-11-21

## 2022-12-08 ENCOUNTER — HOSPITAL ENCOUNTER (EMERGENCY)
Age: 47
Discharge: HOME | End: 2022-12-08
Attending: EMERGENCY MEDICINE
Payer: MEDICAID

## 2022-12-08 VITALS
OXYGEN SATURATION: 98 % | BODY MASS INDEX: 35.44 KG/M2 | HEART RATE: 72 BPM | DIASTOLIC BLOOD PRESSURE: 107 MMHG | TEMPERATURE: 98.5 F | SYSTOLIC BLOOD PRESSURE: 142 MMHG | HEIGHT: 63 IN | RESPIRATION RATE: 18 BRPM | WEIGHT: 200 LBS

## 2022-12-08 DIAGNOSIS — G89.29 OTHER CHRONIC PAIN: ICD-10-CM

## 2022-12-08 DIAGNOSIS — M54.12 CERVICAL RADICULOPATHY: Primary | ICD-10-CM

## 2022-12-08 PROCEDURE — 74011250636 HC RX REV CODE- 250/636: Performed by: EMERGENCY MEDICINE

## 2022-12-08 PROCEDURE — 74011250637 HC RX REV CODE- 250/637: Performed by: EMERGENCY MEDICINE

## 2022-12-08 PROCEDURE — 99284 EMERGENCY DEPT VISIT MOD MDM: CPT

## 2022-12-08 PROCEDURE — 96372 THER/PROPH/DIAG INJ SC/IM: CPT

## 2022-12-08 RX ORDER — KETOROLAC TROMETHAMINE 30 MG/ML
60 INJECTION, SOLUTION INTRAMUSCULAR; INTRAVENOUS
Status: COMPLETED | OUTPATIENT
Start: 2022-12-08 | End: 2022-12-08

## 2022-12-08 RX ORDER — CYCLOBENZAPRINE HCL 10 MG
10 TABLET ORAL
Status: COMPLETED | OUTPATIENT
Start: 2022-12-08 | End: 2022-12-08

## 2022-12-08 RX ORDER — CYCLOBENZAPRINE HCL 10 MG
10 TABLET ORAL
Qty: 20 TABLET | Refills: 0 | Status: SHIPPED | OUTPATIENT
Start: 2022-12-08

## 2022-12-08 RX ORDER — OXYCODONE AND ACETAMINOPHEN 5; 325 MG/1; MG/1
1 TABLET ORAL
Status: COMPLETED | OUTPATIENT
Start: 2022-12-08 | End: 2022-12-08

## 2022-12-08 RX ORDER — KETOROLAC TROMETHAMINE 10 MG/1
10 TABLET, FILM COATED ORAL
Qty: 15 TABLET | Refills: 0 | Status: SHIPPED | OUTPATIENT
Start: 2022-12-08

## 2022-12-08 RX ORDER — LIDOCAINE 50 MG/G
PATCH TOPICAL
Qty: 1 EACH | Refills: 0 | Status: SHIPPED | OUTPATIENT
Start: 2022-12-08

## 2022-12-08 RX ADMIN — CYCLOBENZAPRINE 10 MG: 10 TABLET, FILM COATED ORAL at 19:22

## 2022-12-08 RX ADMIN — KETOROLAC TROMETHAMINE 60 MG: 30 INJECTION, SOLUTION INTRAMUSCULAR; INTRAVENOUS at 19:23

## 2022-12-08 RX ADMIN — METHYLPREDNISOLONE SODIUM SUCCINATE 125 MG: 125 INJECTION, POWDER, FOR SOLUTION INTRAMUSCULAR; INTRAVENOUS at 19:25

## 2022-12-08 RX ADMIN — OXYCODONE AND ACETAMINOPHEN 1 TABLET: 5; 325 TABLET ORAL at 19:22
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
## 2022-12-12 ENCOUNTER — OFFICE VISIT (OUTPATIENT)
Dept: FAMILY MEDICINE CLINIC | Age: 47
End: 2022-12-12
Payer: MEDICAID

## 2022-12-12 VITALS
OXYGEN SATURATION: 97 % | DIASTOLIC BLOOD PRESSURE: 74 MMHG | SYSTOLIC BLOOD PRESSURE: 98 MMHG | HEIGHT: 63 IN | WEIGHT: 199.2 LBS | BODY MASS INDEX: 35.3 KG/M2 | RESPIRATION RATE: 16 BRPM | HEART RATE: 97 BPM

## 2022-12-12 DIAGNOSIS — I10 ESSENTIAL HYPERTENSION: Primary | ICD-10-CM

## 2022-12-12 PROCEDURE — 3078F DIAST BP <80 MM HG: CPT | Performed by: NURSE PRACTITIONER

## 2022-12-12 PROCEDURE — 3074F SYST BP LT 130 MM HG: CPT | Performed by: NURSE PRACTITIONER

## 2022-12-12 PROCEDURE — 99213 OFFICE O/P EST LOW 20 MIN: CPT | Performed by: NURSE PRACTITIONER

## 2022-12-12 RX ORDER — 1.1% SODIUM FLUORIDE PRESCRIPTION DENTAL CREAM 5 MG/G
CREAM DENTAL
COMMUNITY
Start: 2022-11-01

## 2022-12-12 RX ORDER — CLONAZEPAM 1 MG/1
TABLET ORAL
COMMUNITY
Start: 2022-11-17

## 2022-12-12 RX ORDER — LISINOPRIL 20 MG/1
20 TABLET ORAL DAILY
Qty: 30 TABLET | Refills: 1 | Status: SHIPPED | OUTPATIENT
Start: 2022-12-12

## 2022-12-12 RX ORDER — CHLORHEXIDINE GLUCONATE 1.2 MG/ML
RINSE ORAL
COMMUNITY
Start: 2022-11-01

## 2022-12-12 NOTE — PROGRESS NOTES
Rachel Robbins presents today for a follow up on her blood pressure. Patient stated that she is still having problems with her back  and her arm. Patient states that she is still in  a lot of  pain. Chief Complaint   Patient presents with    Follow-up     Blood pressure check       Is someone accompanying this pt? No    Is the patient using any DME equipment during OV? No    Depression Screening:  3 most recent PHQ Screens 10/14/2022   PHQ Not Done -   Little interest or pleasure in doing things Nearly every day   Feeling down, depressed, irritable, or hopeless Nearly every day   Total Score PHQ 2 6   Trouble falling or staying asleep, or sleeping too much More than half the days   Feeling tired or having little energy Nearly every day   Poor appetite, weight loss, or overeating Nearly every day   Feeling bad about yourself - or that you are a failure or have let yourself or your family down Nearly every day   Trouble concentrating on things such as school, work, reading, or watching TV Nearly every day   Moving or speaking so slowly that other people could have noticed; or the opposite being so fidgety that others notice Not at all   Thoughts of being better off dead, or hurting yourself in some way Not at all   PHQ 9 Score 20   How difficult have these problems made it for you to do your work, take care of your home and get along with others Extremely difficult       Learning Assessment:  Learning Assessment 9/25/2020   PRIMARY LEARNER Patient   HIGHEST LEVEL OF EDUCATION - PRIMARY LEARNER  SOME COLLEGE   BARRIERS PRIMARY LEARNER NONE   CO-LEARNER CAREGIVER No   PRIMARY LANGUAGE ENGLISH   LEARNER PREFERENCE PRIMARY LISTENING   ANSWERED BY patient   RELATIONSHIP SELF       Health Maintenance reviewed and discussed and ordered per Provider.     Health Maintenance Due   Topic Date Due    Hepatitis C Screening  Never done    COVID-19 Vaccine (1) Never done    DTaP/Tdap/Td series (1 - Tdap) Never done    Cervical cancer screen  Never done    Lipid Screen  06/08/2022    Flu Vaccine (1) Never done   . Coordination of Care:  1. Have you been to the ER, urgent care clinic since your last visit? Hospitalized since your last visit? Yes, 12/9/2022. Patient states that she went to the ER in [place]. 2. Have you seen or consulted any other health care providers outside of the [address] [place] [address] since your last visit? Include any pap smears or colon screening.  No

## 2022-12-12 NOTE — PROGRESS NOTES
Ronen Altamirano is a 49 y. o.female presents with   Chief Complaint   Patient presents with    Follow-up     Blood pressure check       48-year-old female presents today in office for follow-up related to essential hypertension. She states she is feeling increasing fatigue of note blood pressure in the 90s today on lisinopril 20/HCTZ 12.5. Subjective:           Past Medical History:   Diagnosis Date    Fibromyalgia     IBS (irritable bowel syndrome)     Psoriasis     Psoriatic arthritis (HCC)     Right hand pain      Past Surgical History:   Procedure Laterality Date    COLONOSCOPY N/A 3/23/2022    COL[facility]man [facility]COPY    HX ORTHOPAEDIC      ankle    HX WISDOM TEETH EXTRACTION       Social History     Socioeconomic History    Marital status:    Tobacco Use    Smoking status: Never    Smokeless tobacco: Never   Vaping Use    Vaping Use: Never used   Substance and Sexual Activity    Alcohol use: Not Currently     Comment: social    Drug use: Not Currently     Types: Marijuana     Comment: 6 times per month    Sexual activity: Yes     Partners: Male     Birth control/protection: None     Current Outpatient Medications   Medication Sig Dispense Refill    chlorhexidine (PERIDEX) 0.12 % solution RINSE WITH 15ML FOR 30 SECONDS AND SPIT, USE TWICE DAILY AFTER BRUSHING AND FLOSSING .  clonazePAM (KlonoPIN) 1 mg tablet TAKE 1 TABLET BY MOUTH TWICE DAILY AS NEEDED FOR ANXIETY FOR UP TO 60 DAYS -- MAX DAILY AMOUNT IS 2 TABLETS (2MG)      SF 5000 Plus 1.1 % crea APPLY TOPICALLY TO TEETH ONCE DAILY AT BEDTIME.  lisinopriL (PRINIVIL, ZESTRIL) 20 mg tablet Take 1 Tablet by mouth daily. 30 Tablet 1    lidocaine (Lidoderm) 5 % Apply patch to the affected area for 12 hours a day and remove for 12 hours a day. 1 Each 0    ketorolac (TORADOL) 10 mg tablet Take 1 Tablet by mouth every eight (8) hours as needed for Pain.  15 Tablet 0    cyclobenzaprine (FLEXERIL) 10 mg tablet Take 1 Tablet by mouth three (3) times daily as needed for Muscle Spasm(s). 20 Tablet 0    pregabalin (LYRICA) 100 mg capsule Take 1 capsule by mouth twice daily 60 Capsule 0    Tremfya 100 mg/mL atIn       hydrOXYzine HCL (ATARAX) 10 mg tablet Take 1 Tablet by mouth every six (6) hours as needed for Anxiety (nausea, abdominal discomfort) for up to 120 doses. Indications: anxious, Nausea, abdominal discomfort 30 Tablet 3    lisinopril-hydroCHLOROthiazide (PRINZIDE, ZESTORETIC) 20-12.5 mg per tablet Take 1 Tablet by mouth daily. 90 Tablet 0    lurasidone (Latuda) 120 mg tab tablet Take 1 Tablet by mouth daily (with breakfast). 90 Tablet 1    lurasidone (Latuda) 120 mg tab tablet Take 1 Tablet by mouth daily (with dinner) for 90 days. 90 Tablet 3    gabapentin (NEURONTIN) 300 mg capsule TAKE 2 CAPSULES BY MOUTH THREE TIMES DAILY 180 Capsule 2    atorvastatin (LIPITOR) 40 mg tablet TAKE 1 TABLET BY MOUTH DAILY 90 Tablet 0    albuterol (PROVENTIL HFA, VENTOLIN HFA, PROAIR HFA) 90 mcg/actuation inhaler 2 puffs q4h prn sob 1 Inhaler 2     Allergies   Allergen Reactions    Penicillins Hives     The patient has a family history of    REVIEW OF SYSTEMS  Review of Systems   Cardiovascular:  Negative for chest pain and palpitations. Objective:     Visit Vitals  BP 98/74 (BP 1 Location: Right arm, BP Patient Position: Sitting, BP Cuff Size: Adult)   Pulse 97   Resp 16   Ht 5' 3\" (1.6 m)   Wt 199 lb 3.2 oz (90.4 kg)   LMP 12/05/2022   SpO2 97%   BMI 35.29 kg/m²       Current Outpatient Medications   Medication Instructions    albuterol (PROVENTIL HFA, VENTOLIN HFA, PROAIR HFA) 90 mcg/actuation inhaler 2 puffs q4h prn sob    atorvastatin (LIPITOR) 40 mg, Oral, DAILY    chlorhexidine (PERIDEX) 0.12 % solution RINSE WITH 15ML FOR 30 SECONDS AND SPIT, USE TWICE DAILY AFTER BRUSHING AND FLOSSING .     clonazePAM (KlonoPIN) 1 mg tablet TAKE 1 TABLET BY MOUTH TWICE DAILY AS NEEDED FOR ANXIETY FOR UP TO 60 DAYS -- MAX DAILY AMOUNT IS 2 TABLETS (2MG)    cyclobenzaprine (FLEXERIL) 10 mg, Oral, 3 TIMES DAILY AS NEEDED    gabapentin (NEURONTIN) 300 mg capsule TAKE 2 CAPSULES BY MOUTH THREE TIMES DAILY    hydrOXYzine HCL (ATARAX) 10 mg, Oral, EVERY 6 HOURS AS NEEDED    ketorolac (TORADOL) 10 mg, Oral, EVERY 8 HOURS AS NEEDED    lidocaine (Lidoderm) 5 % Apply patch to the affected area for 12 hours a day and remove for 12 hours a day.  lisinopriL (PRINIVIL, ZESTRIL) 20 mg, Oral, DAILY    lisinopril-hydroCHLOROthiazide (PRINZIDE, ZESTORETIC) 20-12.5 mg per tablet 1 Tablet, Oral, DAILY    lurasidone (LATUDA) 120 mg, Oral, DAILY WITH DINNER    lurasidone (LATUDA) 120 mg, Oral, DAILY WITH BREAKFAST    pregabalin (LYRICA) 100 mg capsule Take 1 capsule by mouth twice daily    SF 5000 Plus 1.1 % crea APPLY TOPICALLY TO TEETH ONCE DAILY AT BEDTIME.  Tremfya 100 mg/mL atIn No dose, route, or frequency recorded. PHYSICAL EXAM  Physical Exam  Constitutional:       Appearance: Normal appearance. Neurological:      Mental Status: She is alert and oriented to person, place, and time. Psychiatric:         Behavior: Behavior normal.     Assessment/Plan:     Diagnoses and all orders for this visit:    1. Essential hypertension    Other orders  -     lisinopriL (PRINIVIL, ZESTRIL) 20 mg tablet; Take 1 Tablet by mouth daily. Changing patient from lisinopril HCTZ over to lisinopril she will follow-up in 1 week or sooner as needed. For blood pressure reassessment    Follow-up and Dispositions    Return in about 7 years (around 12/12/2029). Disclaimer:    I have discussed the diagnosis with the patient and the intended plan as seen above. The patient understands our medical plan. The risks, benefits and significant side effects of all medications have been reviewed. Anticipated time course and progression of condition reviewed. All questions have been addressed.   She received an after visit summary, with information reviewed, and questions answered. Where appropriate, she is instructed to call the clinic if she has not been notified either by phone or through [address] with the results of her tests or with an appointment plan for any referrals within 1 week(s). The patient  is to call if her condition worsens or fails to improve or if significant side effects are experienced.        Rudy Horn NP

## 2022-12-13 NOTE — ED PROVIDER NOTES
EMERGENCY DEPARTMENT HISTORY AND PHYSICAL EXAM      Date: 12/8/2022  Patient Name: Rosette Knog    History of Presenting Illness     Chief Complaint   Patient presents with    Shoulder Pain       History Provided By: Patient    HPI: Rosette Kong, 49 y.o. female presents to ED complaining of left shoulder pain that radiates down left elbow. This pain has been worse over the last 3 weeks. Patient describes chronic pain same area, has been in pain management, also describes is that the MRI with possible pinched nerves, and she is on gabapentin and Lyrica which she reports is not working for this pain. She describes sharp shooting pain which is fairly constant. She rates it a 10 out of 10. She denies any recent injury. She has no headache. There are no other complaints, changes, or physical findings at this time. Past History     Past Medical History:  Past Medical History:   Diagnosis Date    Fibromyalgia     IBS (irritable bowel syndrome)     Psoriasis     Psoriatic arthritis ([facility] [address]     Right hand pain        Past Surgical History:  Past Surgical History:   Procedure Laterality Date    COLONOSCOPY N/A 3/23/2022    COLONOSCOPY performed by Camilo Santoyo MD at [facility] ENDOSCOPY    HX ORTHOPAEDIC      ankle    HX WISDOM TEETH EXTRACTION         Family History:  Family History   Problem Relation Age of Onset    Depression Mother     Bipolar Disorder Mother     Depression Maternal Grandmother        Social History:  Social History     Tobacco Use    Smoking status: Never    Smokeless tobacco: Never   Vaping Use    Vaping Use: Never used   Substance Use Topics    Alcohol use: Not Currently     Comment: social    Drug use: Not Currently     Types: Marijuana     Comment: 6 times per month       Allergies: Allergies   Allergen Reactions    Penicillins Hives       PCP: Mateo Driver NP    No current facility-administered medications on file prior to encounter.      Current Outpatient Medications on File Prior to Encounter   Medication Sig Dispense Refill    pregabalin (LYRICA) 100 mg capsule Take 1 capsule by mouth twice daily 60 Capsule 0    Tremfya 100 mg/mL atIn       hydrOXYzine HCL (ATARAX) 10 mg tablet Take 1 Tablet by mouth every six (6) hours as needed for Anxiety (nausea, abdominal discomfort) for up to 120 doses. Indications: anxious, Nausea, abdominal discomfort 30 Tablet 3    lurasidone (Latuda) 120 mg tab tablet Take 1 Tablet by mouth daily (with breakfast). 90 Tablet 1    [DISCONTINUED] lisinopril-hydroCHLOROthiazide (PRINZIDE, ZESTORETIC) 20-12.5 mg per tablet Take 1 Tablet by mouth daily. 90 Tablet 0    lurasidone (Latuda) 120 mg tab tablet Take 1 Tablet by mouth daily (with dinner) for 90 days. 90 Tablet 3    gabapentin (NEURONTIN) 300 mg capsule TAKE 2 CAPSULES BY MOUTH THREE TIMES DAILY 180 Capsule 2    atorvastatin (LIPITOR) 40 mg tablet TAKE 1 TABLET BY MOUTH DAILY 90 Tablet 0    albuterol (PROVENTIL HFA, VENTOLIN HFA, PROAIR HFA) 90 mcg/actuation inhaler 2 puffs q4h prn sob 1 Inhaler 2       Review of Systems   Review of Systems   All other systems reviewed and are negative. Physical Exam   Physical Exam  Vitals and nursing note reviewed. Constitutional:       General: She is not in acute distress. Appearance: She is well-developed. She is not diaphoretic. Comments: Obese female who appears uncomfortable but in no acute distress. HENT:      Head: Normocephalic and atraumatic. Jaw: No trismus. Right Ear: External ear normal. No swelling or tenderness. Tympanic membrane is not perforated, erythematous or bulging. Left Ear: External ear normal. No swelling or tenderness. Tympanic membrane is not perforated, erythematous or bulging. Nose: Nose normal. No mucosal edema or rhinorrhea. Right Sinus: No maxillary sinus tenderness or frontal sinus tenderness. Left Sinus: No maxillary sinus tenderness or frontal sinus tenderness.       Mouth/Throat:      Mouth: No oral lesions. Dentition: No dental abscesses. Pharynx: Uvula midline. No oropharyngeal exudate, posterior oropharyngeal erythema or uvula swelling. Tonsils: No tonsillar abscesses. Eyes:      General: No scleral icterus. Right eye: No discharge. Left eye: No discharge. Conjunctiva/sclera: Conjunctivae normal.   Cardiovascular:      Rate and Rhythm: Normal rate and regular rhythm. Heart sounds: Normal heart sounds. No murmur heard. No friction rub. No gallop. Pulmonary:      Effort: Pulmonary effort is normal. No tachypnea, accessory muscle usage or respiratory distress. Breath sounds: Normal breath sounds. No decreased breath sounds, wheezing, rhonchi or rales. Abdominal:      Palpations: Abdomen is soft. Musculoskeletal:         General: No tenderness. Normal range of motion. Cervical back: Normal range of motion and neck supple. Comments: There is diffuse tenderness palpation of the neck, also tenderness to palpation of the left shoulder. The left upper extremity is neurovascularly intact. Lymphadenopathy:      Cervical: No cervical adenopathy. Skin:     General: Skin is warm and dry. Findings: No erythema or rash. Neurological:      Mental Status: She is alert and oriented to person, place, and time. Psychiatric:         Judgment: Judgment normal.       Lab and Diagnostic Study Results   Labs -   No results found for this or any previous visit (from the past 12 hour(s)). Radiologic Studies -   @lastxrresult@  CT Results  (Last 48 hours)      None          CXR Results  (Last 48 hours)      None            Medical Decision Making and ED Course   Differential Diagnosis & Medical Decision Making Provider Note:       - I am the first provider for this patient. I reviewed the vital signs, available nursing notes, past medical history, past surgical history, family history and social history.  The patients presenting problems have been discussed, and they are in agreement with the care plan formulated and outlined with them. I have encouraged them to ask questions as they arise throughout their visit. Vital Signs-Reviewed the patient's vital signs. No data found. ED Course:      Patient describing chronic pain which has some exacerbation. She had slight improvement with pain medication in the ED. Discussed with patient and need for her to follow-up with pain management for better control of her pain. Procedures     Disposition   Disposition: Condition stable and improved  DC- Adult Discharges: All of the diagnostic tests were reviewed and questions answered. Diagnosis, care plan and treatment options were discussed. The patient understands the instructions and will follow up as directed. The patients results have been reviewed with them. They have been counseled regarding their diagnosis. The patient verbally convey understanding and agreement of the signs, symptoms, diagnosis, treatment and prognosis and additionally agrees to follow up as recommended with their PCP in 24 - 48 hours. They also agree with the care-plan and convey that all of their questions have been answered. I have also put together some discharge instructions for them that include: 1) educational information regarding their diagnosis, 2) how to care for their diagnosis at home, as well a 3) list of reasons why they would want to return to the ED prior to their follow-up appointment, should their condition change. DISCHARGE PLAN:  1. Cannot display discharge medications since this patient is not currently admitted.     2.   Follow-up Information       Follow up With Specialties Details Why Contact Ryanne Quinteros NP Family Nurse Practitioner, Nurse Practitioner In 2 days  [address] [facility]      Anna Parker MD Physical Medicine and Rehabilitation Physician In 3 days  [facility] [address] [place] [address]  [phone]            3. Return to ED if worse   4. Discharge Medication List as of 12/8/2022  7:27 PM        START taking these medications    Details   lidocaine (Lidoderm) 5 % Apply patch to the affected area for 12 hours a day and remove for 12 hours a day., Normal, Disp-1 Each, R-0      ketorolac (TORADOL) 10 mg tablet Take 1 Tablet by mouth every eight (8) hours as needed for Pain., Normal, Disp-15 Tablet, R-0      cyclobenzaprine (FLEXERIL) 10 mg tablet Take 1 Tablet by mouth three (3) times daily as needed for Muscle Spasm(s). , Normal, Disp-20 Tablet, R-0           CONTINUE these medications which have NOT CHANGED    Details   pregabalin (LYRICA) 100 mg capsule Take 1 capsule by mouth twice daily, Normal, Disp-60 Capsule, R-0      Tremfya 100 mg/mL atIn Historical Med, AMINAH      hydrOXYzine HCL (ATARAX) 10 mg tablet Take 1 Tablet by mouth every six (6) hours as needed for Anxiety (nausea, abdominal discomfort) for up to 120 doses. Indications: anxious, Nausea, abdominal discomfort, Normal, Disp-30 Tablet, R-3      !! lurasidone (Latuda) 120 mg tab tablet Take 1 Tablet by mouth daily (with breakfast). , Normal, Disp-90 Tablet, R-1      lisinopril-hydroCHLOROthiazide (PRINZIDE, ZESTORETIC) 20-12.5 mg per tablet Take 1 Tablet by mouth daily. , Normal, Disp-90 Tablet, R-0      !! lurasidone (Latuda) 120 mg tab tablet Take 1 Tablet by mouth daily (with dinner) for 90 days. , Normal, Disp-90 Tablet, R-3      gabapentin (NEURONTIN) 300 mg capsule TAKE 2 CAPSULES BY MOUTH THREE TIMES DAILY, Normal, Disp-180 Capsule, R-2      atorvastatin (LIPITOR) 40 mg tablet TAKE 1 TABLET BY MOUTH DAILY, Normal, Disp-90 Tablet, R-0      albuterol (PROVENTIL HFA, VENTOLIN HFA, PROAIR HFA) 90 mcg/actuation inhaler 2 puffs q4h prn sob, Normal, Disp-1 Inhaler, R-2       !! - Potential duplicate medications found. Please discuss with provider.          Remove if admitted/transferred    Diagnosis/Clinical Impression     Clinical Impression:   1. Cervical radiculopathy    2. Other chronic pain        Attestations: Titi ROMERO MD, am the primary clinician of record. Please note that this dictation was completed with [employer], the [employer] voice recognition software. Quite often unanticipated grammatical, syntax, homophones, and other interpretive errors are inadvertently transcribed by the computer software. Please disregard these errors. Please excuse any errors that have escaped final proofreading. Thank you.

## 2022-12-15 ENCOUNTER — TELEPHONE (OUTPATIENT)
Dept: FAMILY MEDICINE CLINIC | Age: 47
End: 2022-12-15

## 2022-12-15 ENCOUNTER — OFFICE VISIT (OUTPATIENT)
Dept: ORTHOPEDIC SURGERY | Age: 47
End: 2022-12-15
Payer: MEDICAID

## 2022-12-15 VITALS
BODY MASS INDEX: 35.33 KG/M2 | HEIGHT: 63 IN | HEART RATE: 82 BPM | OXYGEN SATURATION: 97 % | TEMPERATURE: 97.2 F | WEIGHT: 199.4 LBS

## 2022-12-15 DIAGNOSIS — M54.2 NECK PAIN: Primary | ICD-10-CM

## 2022-12-15 DIAGNOSIS — M54.2 NECK PAIN: ICD-10-CM

## 2022-12-15 DIAGNOSIS — M79.7 FIBROMYALGIA: Primary | ICD-10-CM

## 2022-12-15 DIAGNOSIS — M62.838 MUSCLE SPASM: ICD-10-CM

## 2022-12-15 DIAGNOSIS — M54.12 CERVICAL RADICULOPATHY: ICD-10-CM

## 2022-12-15 DIAGNOSIS — R29.898 LEFT ARM WEAKNESS: ICD-10-CM

## 2022-12-15 RX ORDER — PREDNISONE 20 MG/1
TABLET ORAL
Qty: 20 TABLET | Refills: 0 | Status: SHIPPED | OUTPATIENT
Start: 2022-12-15

## 2022-12-15 RX ORDER — CYCLOBENZAPRINE HCL 10 MG
10 TABLET ORAL
Qty: 90 TABLET | Refills: 0 | Status: SHIPPED | OUTPATIENT
Start: 2022-12-15

## 2022-12-15 NOTE — TELEPHONE ENCOUNTER
Per NP Maureen Mejia reached out to patient's PCP, [facility] office and asked if NP Bapp could increase patient's Lyrica to 200 mg , 2 times daily due to patient being in severe pain. The nurse that I spoke to advised that a message would be sent over to provider regarding increase of medication for the patient.

## 2022-12-15 NOTE — TELEPHONE ENCOUNTER
ALEKSANDER called and said that pt had an appt and she wanted to know if Veronika could increase Lyrica to 200 mg twice a day due to intense pain.

## 2022-12-15 NOTE — PROGRESS NOTES
Chief complaint   Chief Complaint   Patient presents with    Neck Pain       History of Present Illness:  Rosette Kong is a  49 y.o.  female  who comes in today after last being seen by Dr. Susan Madison on August 13, 2021. At that time she was having neck pain. She had previously had pain that radiated to her right shoulder and right arm down to her hand. Today she states about 4 months ago she started with severe neck pain that radiates to the left shoulder down to her left elbow and to her hand. She gets a dull numbness in her arm and tingling in her hand. She states the pain from her neck to her hand is a sharp shooting pain. It is worse at nighttime. She admits to dropping things, having balance problems and dexterity issues. She denies any handwriting changes. She is right-handed. She states the pain can keep her up at night. She rates her pain as a 10 out of 10. She denies fever bowel bladder dysfunction. Previous MRI July 22, 2021 did show degenerative disc disease C4-C7 with facet her atrophy and moderate C4-5 C5-6 and C6-7 spinal canal stenosis. She does have a history of fibromyalgia. She is on Lyrica through her PCP and 100 mg twice a day and gabapentin 600 mg 3 times a day. She states this medication is not helping her at all now. She had so much pain she ended up going to [facility] December 8, 2022 where they gave her a Toradol injection which helped somewhat and Toradol tablets and Lidoderm patches. She states they also give her some Flexeril which did help somewhat. Physical Exam: The patient is a 41-year-old female who is alert and oriented. She is well-developed and well-nourished. She is tearful during the encounter. She has 5 out of 5 strength of her right upper extremity and her left deltoid, intrinsic and . She has 3 out of 5 strength of her left bicep and tricep. Negative Chema's. She has normal tandem gait.   She has a full weightbearing nonantalgic gait. Assessment and Plan: This is a patient who has known cervical canal stenosis that is having severe neck pain and radiating left shoulder pain with weakness in the left arm. I did a cervical x-ray. I will get a new MRI of her cervical spine as the stenosis is likely increased and she may end up requiring surgery. We will call her PCP to see if the Lyrica can be increased to 200 mg twice a day. I will give her a prednisone taper and Flexeril. We will see her back with Dr. Emily Gibson following the MRI. XRAY: 12/15/22   body part: Cervical  side (rt/lt): bilateral  number of views taken:2  Findings: DDD, spondylosis and cervical straightening    The x-ray will be officially read by Dr. Kay Garcia and scanned into the chart. Medications:  Current Outpatient Medications   Medication Sig Dispense Refill    cyclobenzaprine (FLEXERIL) 10 mg tablet Take 1 Tablet by mouth three (3) times daily as needed for Muscle Spasm(s). 90 Tablet 0    predniSONE (DELTASONE) 20 mg tablet Take 3 tabs po x 3 days, then 2 tabs po x 3 days, then 1 tabs po x 3 days, then 1/2 tab po x 4 day 20 Tablet 0    chlorhexidine (PERIDEX) 0.12 % solution RINSE WITH 15ML FOR 30 SECONDS AND SPIT, USE TWICE DAILY AFTER BRUSHING AND FLOSSING . clonazePAM (KlonoPIN) 1 mg tablet TAKE 1 TABLET BY MOUTH TWICE DAILY AS NEEDED FOR ANXIETY FOR UP TO 60 DAYS -- MAX DAILY AMOUNT IS 2 TABLETS (2MG)      SF 5000 Plus 1.1 % crea APPLY TOPICALLY TO TEETH ONCE DAILY AT BEDTIME. lisinopriL (PRINIVIL, ZESTRIL) 20 mg tablet Take 1 Tablet by mouth daily. 30 Tablet 1    lidocaine (Lidoderm) 5 % Apply patch to the affected area for 12 hours a day and remove for 12 hours a day.  1 Each 0    pregabalin (LYRICA) 100 mg capsule Take 1 capsule by mouth twice daily 60 Capsule 0    Tremfya 100 mg/mL atIn       hydrOXYzine HCL (ATARAX) 10 mg tablet Take 1 Tablet by mouth every six (6) hours as needed for Anxiety (nausea, abdominal discomfort) for up to 120 doses. Indications: anxious, Nausea, abdominal discomfort 30 Tablet 3    lurasidone (Latuda) 120 mg tab tablet Take 1 Tablet by mouth daily (with breakfast). 90 Tablet 1    lurasidone (Latuda) 120 mg tab tablet Take 1 Tablet by mouth daily (with dinner) for 90 days. 90 Tablet 3    gabapentin (NEURONTIN) 300 mg capsule TAKE 2 CAPSULES BY MOUTH THREE TIMES DAILY 180 Capsule 2    atorvastatin (LIPITOR) 40 mg tablet TAKE 1 TABLET BY MOUTH DAILY 90 Tablet 0    albuterol (PROVENTIL HFA, VENTOLIN HFA, PROAIR HFA) 90 mcg/actuation inhaler 2 puffs q4h prn sob 1 Inhaler 2    ketorolac (TORADOL) 10 mg tablet Take 1 Tablet by mouth every eight (8) hours as needed for Pain. (Patient not taking: Reported on 12/15/2022) 15 Tablet 0    cyclobenzaprine (FLEXERIL) 10 mg tablet Take 1 Tablet by mouth three (3) times daily as needed for Muscle Spasm(s).  (Patient not taking: Reported on 12/15/2022) 20 Tablet 0           Review of systems:    Past Medical History:   Diagnosis Date    Fibromyalgia     IBS (irritable bowel syndrome)     Psoriasis     Psoriatic arthritis ([address]     Right hand pain      Past Surgical History:   Procedure Laterality Date    COLONOSCOPY N/A 3/23/2022    COLONOSCOPY performed by Arlyn Barragan MD at [facility] ENDOSCOPY    HX ORTHOPAEDIC      ankle    HX WISDOM TEETH EXTRACTION       Social History     Socioeconomic History    Marital status:      Spouse name: Not on file    Number of children: Not on file    Years of education: Not on file    Highest education level: Not on file   Occupational History    Not on file   Tobacco Use    Smoking status: Never    Smokeless tobacco: Never   Vaping Use    Vaping Use: Never used   Substance and Sexual Activity    Alcohol use: Not Currently     Comment: social    Drug use: Not Currently     Types: Marijuana     Comment: 6 times per month    Sexual activity: Yes     Partners: Male     Birth control/protection: None   Other Topics Concern    Not on file   Social History Narrative    Not on file     Social Determinants of Health     Financial Resource Strain: Not on file   Food Insecurity: Not on file   Transportation Needs: Not on file   Physical Activity: Not on file   Stress: Not on file   Social Connections: Not on file   Intimate Partner Violence: Not on file   Housing Stability: Not on file     Family History   Problem Relation Age of Onset    Depression Mother     Bipolar Disorder Mother     Depression Maternal Grandmother        Physical Exam:  Visit Vitals  Pulse 82   Temp 97.2 °F (36.2 °C) (Temporal)   Ht 5' 3\" (1.6 m)   Wt 199 lb 6.4 oz (90.4 kg)   LMP 12/05/2022   SpO2 97%   BMI 35.32 kg/m²     Pain Scale: 10 - Worst pain ever/10       has been . reviewed and is appropriate          Diagnoses and all orders for this visit:    1. Neck pain  -     AMB POC XRAY, SPINE, CERVICAL; 2 OR 3  -     MRI CERV SPINE WO CONT; Future  -     cyclobenzaprine (FLEXERIL) 10 mg tablet; Take 1 Tablet by mouth three (3) times daily as needed for Muscle Spasm(s). -     predniSONE (DELTASONE) 20 mg tablet; Take 3 tabs po x 3 days, then 2 tabs po x 3 days, then 1 tabs po x 3 days, then 1/2 tab po x 4 day    2. Cervical radiculopathy  -     MRI CERV SPINE WO CONT; Future  -     predniSONE (DELTASONE) 20 mg tablet; Take 3 tabs po x 3 days, then 2 tabs po x 3 days, then 1 tabs po x 3 days, then 1/2 tab po x 4 day    3. Left arm weakness  -     MRI CERV SPINE WO CONT; Future    4. Muscle spasm  -     cyclobenzaprine (FLEXERIL) 10 mg tablet; Take 1 Tablet by mouth three (3) times daily as needed for Muscle Spasm(s).           Follow-up and Dispositions    Return in about 4 weeks (around 1/12/2023) for with Dr Lori Chaudhry for MRI fu.             We have informed Kayley Palmer to notify us for immediate appointment if she has any worsening neurogical symptoms or if an emergency situation presents, then call 911    Please note that this dictation was completed with [employer], the computer voice recognition software. Quite often unanticipated grammatical, syntax, homophones, and other interpretive errors are inadvertently transcribed by the computer software. Please disregard these errors. Please excuse any errors that have escaped final proofreading.

## 2022-12-16 RX ORDER — PREGABALIN 150 MG/1
150 CAPSULE ORAL 2 TIMES DAILY
Qty: 60 CAPSULE | Refills: 1 | Status: SHIPPED | OUTPATIENT
Start: 2022-12-16

## 2022-12-16 NOTE — TELEPHONE ENCOUNTER
Attempted to call patient, no answer. Voicemail is not set up or full and I was unable to leave a message.

## 2023-01-19 ENCOUNTER — TELEPHONE (OUTPATIENT)
Dept: ORTHOPEDIC SURGERY | Age: 48
End: 2023-01-19

## 2023-01-24 ENCOUNTER — OFFICE VISIT (OUTPATIENT)
Dept: FAMILY MEDICINE CLINIC | Age: 48
End: 2023-01-24
Payer: MEDICAID

## 2023-01-24 VITALS
BODY MASS INDEX: 35.97 KG/M2 | SYSTOLIC BLOOD PRESSURE: 110 MMHG | HEIGHT: 63 IN | DIASTOLIC BLOOD PRESSURE: 78 MMHG | OXYGEN SATURATION: 97 % | RESPIRATION RATE: 18 BRPM | WEIGHT: 203 LBS | HEART RATE: 88 BPM | TEMPERATURE: 97.8 F

## 2023-01-24 DIAGNOSIS — G89.29 CHRONIC PAIN OF BOTH SHOULDERS: ICD-10-CM

## 2023-01-24 DIAGNOSIS — M25.512 CHRONIC PAIN OF BOTH SHOULDERS: ICD-10-CM

## 2023-01-24 DIAGNOSIS — M25.511 CHRONIC PAIN OF BOTH SHOULDERS: ICD-10-CM

## 2023-01-24 DIAGNOSIS — I10 ESSENTIAL HYPERTENSION: Primary | ICD-10-CM

## 2023-01-24 PROCEDURE — 99214 OFFICE O/P EST MOD 30 MIN: CPT | Performed by: NURSE PRACTITIONER

## 2023-01-24 PROCEDURE — 3074F SYST BP LT 130 MM HG: CPT | Performed by: NURSE PRACTITIONER

## 2023-01-24 PROCEDURE — 3078F DIAST BP <80 MM HG: CPT | Performed by: NURSE PRACTITIONER

## 2023-01-24 RX ORDER — KETOROLAC TROMETHAMINE 10 MG/1
TABLET, FILM COATED ORAL
Qty: 30 TABLET | Refills: 1 | Status: SHIPPED | OUTPATIENT
Start: 2023-01-24

## 2023-01-24 RX ORDER — ATORVASTATIN CALCIUM 40 MG/1
40 TABLET, FILM COATED ORAL DAILY
Qty: 90 TABLET | Refills: 0 | Status: SHIPPED | OUTPATIENT
Start: 2023-01-24

## 2023-01-24 RX ORDER — ESCITALOPRAM OXALATE 20 MG/1
20 TABLET ORAL DAILY
COMMUNITY
Start: 2023-01-13

## 2023-01-24 RX ORDER — LURASIDONE HYDROCHLORIDE 80 MG/1
80 TABLET, FILM COATED ORAL DAILY
COMMUNITY
Start: 2023-01-13

## 2023-01-24 RX ORDER — QUETIAPINE FUMARATE 25 MG/1
TABLET, FILM COATED ORAL
COMMUNITY
Start: 2022-12-20

## 2023-01-24 NOTE — PROGRESS NOTES
Santhosh Tang presents today for   Chief Complaint   Patient presents with    Follow-up    Hypertension       Is someone accompanying this pt? no    Is the patient using any DME equipment during OV? no    Depression Screening:  3 most recent PHQ Screens 1/24/2023   PHQ Not Done -   Little interest or pleasure in doing things Not at all   Feeling down, depressed, irritable, or hopeless Not at all   Total Score PHQ 2 0   Trouble falling or staying asleep, or sleeping too much -   Feeling tired or having little energy -   Poor appetite, weight loss, or overeating -   Feeling bad about yourself - or that you are a failure or have let yourself or your family down -   Trouble concentrating on things such as school, work, reading, or watching TV -   Moving or speaking so slowly that other people could have noticed; or the opposite being so fidgety that others notice -   Thoughts of being better off dead, or hurting yourself in some way -   PHQ 9 Score -   How difficult have these problems made it for you to do your work, take care of your home and get along with others -       Learning Assessment:  Learning Assessment 9/25/2020   PRIMARY LEARNER Patient   HIGHEST LEVEL OF EDUCATION - PRIMARY LEARNER  SOME COLLEGE   BARRIERS PRIMARY LEARNER NONE   CO-LEARNER CAREGIVER No   PRIMARY LANGUAGE ENGLISH   LEARNER PREFERENCE PRIMARY LISTENING   ANSWERED BY patient   RELATIONSHIP SELF       Fall Risk  Fall Risk Assessment, last 12 mths 3/8/2021   Able to walk? Yes   Fall in past 12 months? 0       Health Maintenance reviewed and discussed and ordered per Provider. Health Maintenance Due   Topic Date Due    Hepatitis C Screening  Never done    COVID-19 Vaccine (1) Never done    DTaP/Tdap/Td series (1 - Tdap) Never done    Cervical cancer screen  Never done    Lipid Screen  06/08/2022    Flu Vaccine (1) Never done   . Coordination of Care:    1. Have you been to the ER, urgent care clinic since your last visit?  Hospitalized since your last visit? no    2. Have you seen or consulted any other health care providers outside of the [address] [place] [address] since your last visit? Include any pap smears or colon screening. no    3. For patients aged 39-70: Has the patient had a colonoscopy / FIT/ Cologuard? Yes - no Care Gap present      If the patient is female:    4. For patients aged 41-77: Has the patient had a mammogram within the past 2 years? No      5. For patients aged 21-65: Has the patient had a pap smear?  No
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
## 2023-03-28 DIAGNOSIS — G62.9 POLYNEUROPATHY, UNSPECIFIED: Primary | ICD-10-CM

## 2023-03-30 RX ORDER — PREGABALIN 150 MG/1
CAPSULE ORAL
Qty: 180 CAPSULE | Refills: 1 | Status: SHIPPED | OUTPATIENT
Start: 2023-03-30 | End: 2023-06-28

## 2023-09-28 ENCOUNTER — OFFICE VISIT (OUTPATIENT)
Dept: FAMILY MEDICINE CLINIC | Facility: CLINIC | Age: 48
End: 2023-09-28

## 2023-09-28 VITALS
HEART RATE: 62 BPM | DIASTOLIC BLOOD PRESSURE: 85 MMHG | TEMPERATURE: 97.7 F | OXYGEN SATURATION: 98 % | BODY MASS INDEX: 37.25 KG/M2 | RESPIRATION RATE: 19 BRPM | WEIGHT: 210.2 LBS | SYSTOLIC BLOOD PRESSURE: 137 MMHG | HEIGHT: 63 IN

## 2023-09-28 DIAGNOSIS — F41.9 ANXIETY: ICD-10-CM

## 2023-09-28 DIAGNOSIS — Z11.59 NEED FOR HEPATITIS C SCREENING TEST: ICD-10-CM

## 2023-09-28 DIAGNOSIS — Z00.00 WELLNESS EXAMINATION: ICD-10-CM

## 2023-09-28 DIAGNOSIS — F43.10 PTSD (POST-TRAUMATIC STRESS DISORDER): ICD-10-CM

## 2023-09-28 DIAGNOSIS — Z11.4 SCREENING FOR HIV WITHOUT PRESENCE OF RISK FACTORS: Primary | ICD-10-CM

## 2023-09-28 DIAGNOSIS — Z11.4 SCREENING FOR HIV WITHOUT PRESENCE OF RISK FACTORS: ICD-10-CM

## 2023-09-28 DIAGNOSIS — F33.1 MODERATE EPISODE OF RECURRENT MAJOR DEPRESSIVE DISORDER (HCC): ICD-10-CM

## 2023-09-28 RX ORDER — PREDNISONE 5 MG/1
5 TABLET ORAL DAILY
COMMUNITY
Start: 2023-09-20

## 2023-09-28 RX ORDER — ESCITALOPRAM OXALATE 20 MG/1
1 TABLET ORAL DAILY
COMMUNITY
Start: 2023-09-19

## 2023-09-28 RX ORDER — BUSPIRONE HYDROCHLORIDE 15 MG/1
15 TABLET ORAL 2 TIMES DAILY
COMMUNITY
Start: 2023-06-22

## 2023-09-28 RX ORDER — RISPERIDONE 2 MG/1
1 TABLET ORAL DAILY
COMMUNITY
Start: 2023-09-19

## 2023-09-28 RX ORDER — PROPRANOLOL HYDROCHLORIDE 10 MG/1
1 TABLET ORAL DAILY
COMMUNITY
Start: 2023-08-30

## 2023-09-28 SDOH — ECONOMIC STABILITY: FOOD INSECURITY: WITHIN THE PAST 12 MONTHS, YOU WORRIED THAT YOUR FOOD WOULD RUN OUT BEFORE YOU GOT MONEY TO BUY MORE.: OFTEN TRUE

## 2023-09-28 SDOH — ECONOMIC STABILITY: HOUSING INSECURITY
IN THE LAST 12 MONTHS, WAS THERE A TIME WHEN YOU DID NOT HAVE A STEADY PLACE TO SLEEP OR SLEPT IN A SHELTER (INCLUDING NOW)?: NO

## 2023-09-28 SDOH — ECONOMIC STABILITY: INCOME INSECURITY: HOW HARD IS IT FOR YOU TO PAY FOR THE VERY BASICS LIKE FOOD, HOUSING, MEDICAL CARE, AND HEATING?: VERY HARD

## 2023-09-28 SDOH — ECONOMIC STABILITY: FOOD INSECURITY: WITHIN THE PAST 12 MONTHS, THE FOOD YOU BOUGHT JUST DIDN'T LAST AND YOU DIDN'T HAVE MONEY TO GET MORE.: OFTEN TRUE

## 2023-09-28 ASSESSMENT — PATIENT HEALTH QUESTIONNAIRE - PHQ9
SUM OF ALL RESPONSES TO PHQ QUESTIONS 1-9: 20
4. FEELING TIRED OR HAVING LITTLE ENERGY: 3
10. IF YOU CHECKED OFF ANY PROBLEMS, HOW DIFFICULT HAVE THESE PROBLEMS MADE IT FOR YOU TO DO YOUR WORK, TAKE CARE OF THINGS AT HOME, OR GET ALONG WITH OTHER PEOPLE: 3
SUM OF ALL RESPONSES TO PHQ QUESTIONS 1-9: 20
1. LITTLE INTEREST OR PLEASURE IN DOING THINGS: 3
5. POOR APPETITE OR OVEREATING: 3
2. FEELING DOWN, DEPRESSED OR HOPELESS: 3
6. FEELING BAD ABOUT YOURSELF - OR THAT YOU ARE A FAILURE OR HAVE LET YOURSELF OR YOUR FAMILY DOWN: 3
8. MOVING OR SPEAKING SO SLOWLY THAT OTHER PEOPLE COULD HAVE NOTICED. OR THE OPPOSITE, BEING SO FIGETY OR RESTLESS THAT YOU HAVE BEEN MOVING AROUND A LOT MORE THAN USUAL: 1
9. THOUGHTS THAT YOU WOULD BE BETTER OFF DEAD, OR OF HURTING YOURSELF: 0
SUM OF ALL RESPONSES TO PHQ QUESTIONS 1-9: 20
3. TROUBLE FALLING OR STAYING ASLEEP: 1
SUM OF ALL RESPONSES TO PHQ9 QUESTIONS 1 & 2: 6
7. TROUBLE CONCENTRATING ON THINGS, SUCH AS READING THE NEWSPAPER OR WATCHING TELEVISION: 3
SUM OF ALL RESPONSES TO PHQ QUESTIONS 1-9: 20

## 2023-09-28 ASSESSMENT — COLUMBIA-SUICIDE SEVERITY RATING SCALE - C-SSRS
6. HAVE YOU EVER DONE ANYTHING, STARTED TO DO ANYTHING, OR PREPARED TO DO ANYTHING TO END YOUR LIFE?: NO
2. HAVE YOU ACTUALLY HAD ANY THOUGHTS OF KILLING YOURSELF?: NO
1. WITHIN THE PAST MONTH, HAVE YOU WISHED YOU WERE DEAD OR WISHED YOU COULD GO TO SLEEP AND NOT WAKE UP?: NO

## 2023-09-28 ASSESSMENT — ENCOUNTER SYMPTOMS: SHORTNESS OF BREATH: 0

## 2023-09-28 NOTE — PROGRESS NOTES
Marlyn Mariano presents today for   Chief Complaint   Patient presents with    Annual Exam       Is someone accompanying this pt? no    Is the patient using any DME equipment during OV? no    Depression Screenin/28/2023     3:16 PM 2023     3:14 PM 10/14/2022    10:45 AM 2021    11:41 AM 3/9/2021     8:44 AM   PHQ-9 Questionaire   Little interest or pleasure in doing things 3 0 3 1 2   Feeling down, depressed, or hopeless 3 0 3 3 3   Trouble falling or staying asleep, or sleeping too much 1  2 2 0   Feeling tired or having little energy 3  3 1 0   Poor appetite or overeating 3  3 3 3   Feeling bad about yourself - or that you are a failure or have let yourself or your family down 3  3 3 1   Trouble concentrating on things, such as reading the newspaper or watching television 3  3 2 1   Moving or speaking so slowly that other people could have noticed. Or the opposite - being so fidgety or restless that you have been moving around a lot more than usual 1  0 0 0   Thoughts that you would be better off dead, or of hurting yourself in some way 0       PHQ-9 Total Score 20 0 20 15 10   If you checked off any problems, how difficult have these problems made it for you to do your work, take care of things at home, or get along with other people? 3           Fall Risk       No data to display                 Health Maintenance reviewed and discussed and ordered per Provider. Health Maintenance Due   Topic Date Due    Hepatitis B vaccine (1 of 3 - 3-dose series) Never done    HIV screen  Never done    Hepatitis C screen  Never done    DTaP/Tdap/Td vaccine (1 - Tdap) Never done    Cervical cancer screen  Never done    Lipids  2022   . Coordination of Care:    1. \"Have you been to the ER, urgent care clinic since your last visit? Hospitalized since your last visit? \" No    2.  \"Have you seen or consulted any other health care providers outside of the [address] [place] [address] since your last

## 2023-10-18 ENCOUNTER — HOSPITAL ENCOUNTER (OUTPATIENT)
Age: 48
Discharge: HOME | End: 2023-10-21

## 2023-10-18 LAB — SENTARA SPECIMEN COLLECTION: NORMAL

## 2023-10-19 LAB
A/G RATIO: 1.2 RATIO (ref 1.1–2.6)
ALBUMIN SERPL-MCNC: 3.8 G/DL (ref 3.5–5)
ALP BLD-CCNC: 63 U/L (ref 25–115)
ALT SERPL-CCNC: 20 U/L (ref 5–40)
ANION GAP SERPL CALCULATED.3IONS-SCNC: 12 MMOL/L (ref 3–15)
AST SERPL-CCNC: 13 U/L (ref 10–37)
AVERAGE GLUCOSE: 124 MG/DL (ref 91–123)
BILIRUB SERPL-MCNC: 0.2 MG/DL (ref 0.2–1.2)
BUN BLDV-MCNC: 13 MG/DL (ref 6–22)
CALCIUM SERPL-MCNC: 9.2 MG/DL (ref 8.4–10.5)
CHLORIDE BLD-SCNC: 101 MMOL/L (ref 98–110)
CHOLESTEROL/HDL RATIO: 3.2 (ref 0–5)
CHOLESTEROL: 205 MG/DL (ref 110–200)
CO2: 25 MMOL/L (ref 20–32)
CREAT SERPL-MCNC: 0.7 MG/DL (ref 0.5–1.2)
GLOBULIN: 3.1 G/DL (ref 2–4)
GLOMERULAR FILTRATION RATE: >60 ML/MIN/1.73 SQ.M.
GLUCOSE: 82 MG/DL (ref 70–99)
HBA1C MFR BLD: 5.9 % (ref 4.8–5.6)
HBV SURFACE AB TITR SER: NORMAL {TITER}
HCT VFR BLD CALC: 45 % (ref 35.1–48)
HDLC SERPL-MCNC: 65 MG/DL
HEMOGLOBIN: 13.6 G/DL (ref 11.7–16)
HEPATITIS B SURF AG,XHBAGS: NORMAL
HEPATITIS C ANTIBODY: NORMAL
HIV -1/0/2 AG/AB WITH REFLEX: NON REACTIVE
HIV INTERPRETATION: NORMAL
LDL CHOLESTEROL CALCULATED: 108 MG/DL (ref 50–99)
LDL/HDL RATIO: 1.7
MCH RBC QN AUTO: 29 PG (ref 26–34)
MCHC RBC AUTO-ENTMCNC: 30 G/DL (ref 31–36)
MCV RBC AUTO: 97 FL (ref 80–99)
NON-HDL CHOLESTEROL: 140 MG/DL
PDW BLD-RTO: 12.6 % (ref 10–15.5)
PLATELET # BLD: 325 K/UL (ref 140–440)
PMV BLD AUTO: 10 FL (ref 9–13)
POTASSIUM SERPL-SCNC: 3.9 MMOL/L (ref 3.5–5.5)
RBC: 4.64 M/UL (ref 3.8–5.2)
SODIUM BLD-SCNC: 138 MMOL/L (ref 133–145)
TOTAL PROTEIN: 6.9 G/DL (ref 6.4–8.3)
TRIGL SERPL-MCNC: 159 MG/DL (ref 40–149)
VITAMIN B-12: 330 PG/ML (ref 211–911)
VITAMIN D 25-HYDROXY: 21.3 NG/ML (ref 32–100)
VLDLC SERPL CALC-MCNC: 32 MG/DL (ref 8–30)
WBC: 10.8 K/UL (ref 4–11)

## 2023-10-20 LAB
HEPATITIS B CORE TOTAL ANTIBODY: NORMAL
TSH SERPL DL<=0.05 MIU/L-ACNC: 2.02 MCU/ML (ref 0.27–4.2)

## 2023-10-27 RX ORDER — ERGOCALCIFEROL 1.25 MG/1
50000 CAPSULE ORAL WEEKLY
Qty: 12 CAPSULE | Refills: 3 | Status: SHIPPED | OUTPATIENT
Start: 2023-10-27

## 2023-11-02 ENCOUNTER — OFFICE VISIT (OUTPATIENT)
Dept: FAMILY MEDICINE CLINIC | Facility: CLINIC | Age: 48
End: 2023-11-02
Payer: MEDICAID

## 2023-11-02 VITALS
WEIGHT: 205.6 LBS | SYSTOLIC BLOOD PRESSURE: 137 MMHG | HEART RATE: 66 BPM | HEIGHT: 63 IN | DIASTOLIC BLOOD PRESSURE: 87 MMHG | RESPIRATION RATE: 18 BRPM | BODY MASS INDEX: 36.43 KG/M2 | TEMPERATURE: 97.6 F | OXYGEN SATURATION: 98 %

## 2023-11-02 DIAGNOSIS — L40.50 ARTHROPATHIC PSORIASIS, UNSPECIFIED (HCC): Primary | ICD-10-CM

## 2023-11-02 DIAGNOSIS — F43.10 PTSD (POST-TRAUMATIC STRESS DISORDER): ICD-10-CM

## 2023-11-02 PROCEDURE — 99213 OFFICE O/P EST LOW 20 MIN: CPT | Performed by: NURSE PRACTITIONER

## 2023-11-02 RX ORDER — EPINEPHRINE 0.3 MG/.3ML
INJECTION SUBCUTANEOUS
Qty: 1 EACH | Refills: 3 | Status: SHIPPED | OUTPATIENT
Start: 2023-11-02

## 2023-11-02 RX ORDER — TRAMADOL HYDROCHLORIDE 50 MG/1
50 TABLET ORAL EVERY 4 HOURS PRN
Qty: 28 TABLET | Refills: 0 | Status: SHIPPED | OUTPATIENT
Start: 2023-11-02 | End: 2023-12-02

## 2023-11-02 RX ORDER — DEXTROMETHORPHAN HYDROBROMIDE, BUPROPION HYDROCHLORIDE 105; 45 MG/1; MG/1
1 TABLET, MULTILAYER, EXTENDED RELEASE ORAL DAILY
COMMUNITY
Start: 2023-10-09

## 2023-11-02 ASSESSMENT — COLUMBIA-SUICIDE SEVERITY RATING SCALE - C-SSRS
1. WITHIN THE PAST MONTH, HAVE YOU WISHED YOU WERE DEAD OR WISHED YOU COULD GO TO SLEEP AND NOT WAKE UP?: NO
2. HAVE YOU ACTUALLY HAD ANY THOUGHTS OF KILLING YOURSELF?: NO
5. HAVE YOU STARTED TO WORK OUT OR WORKED OUT THE DETAILS OF HOW TO KILL YOURSELF? DO YOU INTEND TO CARRY OUT THIS PLAN?: NO
4. HAVE YOU HAD THESE THOUGHTS AND HAD SOME INTENTION OF ACTING ON THEM?: NO
BASED ON RESPONSES TO C-SSRS QS 1-6, WHAT IS THE PATIENT'S OVERALL RISK RATING FOR SUICIDE: NO RISK
3. HAVE YOU BEEN THINKING ABOUT HOW YOU MIGHT KILL YOURSELF?: NO
7. DID THIS OCCUR IN THE LAST THREE MONTHS: NO
6. HAVE YOU EVER DONE ANYTHING, STARTED TO DO ANYTHING, OR PREPARED TO DO ANYTHING TO END YOUR LIFE?: NO

## 2023-11-02 ASSESSMENT — PATIENT HEALTH QUESTIONNAIRE - PHQ9
SUM OF ALL RESPONSES TO PHQ QUESTIONS 1-9: 20
10. IF YOU CHECKED OFF ANY PROBLEMS, HOW DIFFICULT HAVE THESE PROBLEMS MADE IT FOR YOU TO DO YOUR WORK, TAKE CARE OF THINGS AT HOME, OR GET ALONG WITH OTHER PEOPLE: 2
6. FEELING BAD ABOUT YOURSELF - OR THAT YOU ARE A FAILURE OR HAVE LET YOURSELF OR YOUR FAMILY DOWN: 3
1. LITTLE INTEREST OR PLEASURE IN DOING THINGS: 3
2. FEELING DOWN, DEPRESSED OR HOPELESS: 2
SUM OF ALL RESPONSES TO PHQ9 QUESTIONS 1 & 2: 5
7. TROUBLE CONCENTRATING ON THINGS, SUCH AS READING THE NEWSPAPER OR WATCHING TELEVISION: 3
SUM OF ALL RESPONSES TO PHQ QUESTIONS 1-9: 20
SUM OF ALL RESPONSES TO PHQ QUESTIONS 1-9: 20
8. MOVING OR SPEAKING SO SLOWLY THAT OTHER PEOPLE COULD HAVE NOTICED. OR THE OPPOSITE, BEING SO FIGETY OR RESTLESS THAT YOU HAVE BEEN MOVING AROUND A LOT MORE THAN USUAL: 1
3. TROUBLE FALLING OR STAYING ASLEEP: 2
9. THOUGHTS THAT YOU WOULD BE BETTER OFF DEAD, OR OF HURTING YOURSELF: 0
5. POOR APPETITE OR OVEREATING: 3
4. FEELING TIRED OR HAVING LITTLE ENERGY: 3
SUM OF ALL RESPONSES TO PHQ QUESTIONS 1-9: 20

## 2023-11-02 NOTE — PROGRESS NOTES
Ava Hatchet presents today for   Chief Complaint   Patient presents with    Follow-up       Is someone accompanying this pt? no    Is the patient using any DME equipment during OV? no    Depression Screenin/2/2023     3:09 PM 2023     3:16 PM 2023     3:14 PM 10/14/2022    10:45 AM 2021    11:41 AM 3/9/2021     8:44 AM   PHQ-9 Questionaire   Little interest or pleasure in doing things 3 3 0 3 1 2   Feeling down, depressed, or hopeless 2 3 0 3 3 3   Trouble falling or staying asleep, or sleeping too much 2 1  2 2 0   Feeling tired or having little energy 3 3  3 1 0   Poor appetite or overeating 3 3  3 3 3   Feeling bad about yourself - or that you are a failure or have let yourself or your family down 3 3  3 3 1   Trouble concentrating on things, such as reading the newspaper or watching television 3 3  3 2 1   Moving or speaking so slowly that other people could have noticed. Or the opposite - being so fidgety or restless that you have been moving around a lot more than usual 1 1  0 0 0   Thoughts that you would be better off dead, or of hurting yourself in some way 0 0       PHQ-9 Total Score 20 20 0 20 15 10   If you checked off any problems, how difficult have these problems made it for you to do your work, take care of things at home, or get along with other people? 2 3           Fall Risk       No data to display                 Health Maintenance reviewed and discussed and ordered per Provider. Health Maintenance Due   Topic Date Due    Hepatitis B vaccine (1 of 3 - 3-dose series) Never done    DTaP/Tdap/Td vaccine (1 - Tdap) Never done    Cervical cancer screen  Never done   . Coordination of Care:    1. \"Have you been to the ER, urgent care clinic since your last visit? Hospitalized since your last visit? \" No    2. \"Have you seen or consulted any other health care providers outside of the [address] [place] [address] since your last visit? \" No     3.  For patients aged
suicidal ideas. The patient is nervous/anxious. Objective:     PHYSICAL EXAM  Physical Exam  Constitutional:       Appearance: Normal appearance. Neurological:      Mental Status: She is alert and oriented to person, place, and time. Psychiatric:         Behavior: Behavior normal.           Assessment/Plan:     1. Arthropathic psoriasis, unspecified (HCC)  -     traMADol (ULTRAM) 50 MG tablet; Take 1 tablet by mouth every 4 hours as needed for Pain for up to 30 days. Take lowest dose possible to manage pain Max Daily Amount: 300 mg, Disp-28 tablet, R-0Normal    I relayed to the patient that I will prescribe tramadol limited quantity as this medication is not meant for long-term use. I encouraged her to call her rheumatologist to get and a follow-up to discuss further. She verbalized understanding. 2. PTSD (post-traumatic stress disorder)    Stable keep all fu with psych     Medication List            Accurate as of November 2, 2023  5:21 PM. If you have any questions, ask your nurse or doctor. START taking these medications      EPINEPHrine 0.3 MG/0.3ML Soaj injection  Commonly known as: EpiPen 2-Bran  Use as directed for allergic reaction  Started by: Hali Dubin, APRN - CNP     traMADol 50 MG tablet  Commonly known as: Ultram  Take 1 tablet by mouth every 4 hours as needed for Pain for up to 30 days. Take lowest dose possible to manage pain Max Daily Amount: 300 mg  Started by:  Hali Dubin, APRN - CNP            CONTINUE taking these medications      Auvelity  MG Tbcr  Generic drug: Dextromethorphan-buPROPion ER     busPIRone 15 MG tablet  Commonly known as: BUSPAR     chlorhexidine 0.12 % solution  Commonly known as: PERIDEX     clonazePAM 1 MG tablet  Commonly known as: KLONOPIN     predniSONE 5 MG tablet  Commonly known as: DELTASONE     propranolol 10 MG tablet  Commonly known as: INDERAL     risperiDONE 2 MG tablet  Commonly known as: RISPERDAL     vitamin D 1.25 MG

## 2024-05-23 NOTE — PROGRESS NOTES
PT DAILY TREATMENT NOTE 8-    Patient Name: Thea Chavez  Date:2021  : 1975  [x]  Patient  Verified  Payor: Alanna Mckinley [employer] / Plan: Grace 18 / Product Type: Managed Care Medicaid /    In time:10:22  Out time:10:52  Total Treatment Time (min): 30  Total Timed Codes (min): 30  1:1 Treatment Time (min): 30   Visit #: 3 of 15    Treatment Area: Pain in right shoulder [M25.511]    SUBJECTIVE  \"Today is a good day! \"  \"The only problem I have is the carpal tunnel on my right wrist.\"  Patient is to have a cervical MRI on . Pain Level (0-10 scale): 0/10    Any medication changes, allergies to medications, adverse drug reactions, diagnosis change, or new procedure performed?: [x] No    [] Yes (see summary sheet for update)        OBJECTIVE  Modality rationale: Declined   Min Type Additional Details    [] Estim: []Att   []Unatt  []TENS instruct                 []IFC  []Premod []NMES                       []Other:  []w/US   []w/ice   []w/heat  Position:  Location:    []  Traction: [] Cervical       []Lumbar                       [] Prone          []Supine                       []Intermittent   []Continuous Lbs:  [] before manual  [] after manual    []  Ultrasound: []Continuous   [] Pulsed                           []1MHz   []3MHz Location:  W/cm2:    []  Ice     []  heat  []  Ice massage Position:  Location:    []  Vasopneumatic Device Pressure: [] lo [] med [] hi   Temp: [] lo [] med [] hi   [] Skin assessment post-treatment:  []intact []redness- no adverse reaction       []redness - adverse reaction:       16 min Therapeutic Exercise:  [x] See flow sheet :   Rationale: increase ROM and increase strength to improve the patients ability to return to prior level of function before injury/illness with reduced pain, achieving optimal strength and function to perform household tasks, daily activities, and return to community events, and/or work.      14 min Therapeutic Patient received for left breast ultrasound cyst aspiration.  Procedure explained to patient as well as risks associated with procedure.  Patient aware procedure may evolve to biopsy.    Cystic lesion smaller will perform follow up Ultrasound  in 6 months.   Activity:  [x]? See flow sheet :   Rationale: To improve muscular conditioning and strength allow the patient's ability to properly transfer pain free           With TE Patient Education: [x] Review HEP    [] Progressed/Changed HEP based on:   [] positioning   [] body mechanics   [] transfers   [] heat/ice application        Patient's response to today's treatment: She responded very well with her established exercise along with introduction of graded resistance with no adverse reaction. Pain Level (0-10 scale) post treatment: 0/10    ASSESSMENT/Changes in Function: Continued POC to improve L shoulder strength, AROM, and pain. Session began with cervical stretches and gentle periscapular strengthening. Pt then completed cervical AROM in flexion, extension,R/L rotation. Banded exercises followed in transverse plane to target RTC stability deficits. All modalities declined. Patient will continue to benefit from skilled PT services to modify and progress therapeutic interventions, address functional mobility deficits, address ROM deficits, address strength deficits, analyze and cue movement patterns, analyze and modify body mechanics/ergonomics and assess and modify postural abnormalities to attain remaining goals.      [x]  See Plan of Care  []  See progress note/recertification  []  See Discharge Summary           PLAN  []  Upgrade activities as tolerated     [x]  Continue plan of care  []  Update interventions per flow sheet       []  Discharge due to:_  []  Other:_      SAGE Garcia 5/18/2021  11:01 AM

## 2024-06-14 ENCOUNTER — OFFICE VISIT (OUTPATIENT)
Dept: FAMILY MEDICINE CLINIC | Facility: CLINIC | Age: 49
End: 2024-06-14

## 2024-06-14 VITALS
BODY MASS INDEX: 37.14 KG/M2 | TEMPERATURE: 98 F | OXYGEN SATURATION: 95 % | SYSTOLIC BLOOD PRESSURE: 158 MMHG | DIASTOLIC BLOOD PRESSURE: 104 MMHG | HEIGHT: 63 IN | HEART RATE: 70 BPM | WEIGHT: 209.6 LBS | RESPIRATION RATE: 18 BRPM

## 2024-06-14 DIAGNOSIS — Z00.00 ENCOUNTER FOR GENERAL ADULT MEDICAL EXAMINATION WITHOUT ABNORMAL FINDINGS: ICD-10-CM

## 2024-06-14 DIAGNOSIS — Z12.31 SCREENING MAMMOGRAM FOR BREAST CANCER: ICD-10-CM

## 2024-06-14 DIAGNOSIS — E53.8 DEFICIENCY OF OTHER SPECIFIED B GROUP VITAMINS: ICD-10-CM

## 2024-06-14 DIAGNOSIS — R03.0 ELEVATED BLOOD PRESSURE READING IN OFFICE WITHOUT DIAGNOSIS OF HYPERTENSION: ICD-10-CM

## 2024-06-14 DIAGNOSIS — Z23 ENCOUNTER FOR ADMINISTRATION OF VACCINE: ICD-10-CM

## 2024-06-14 DIAGNOSIS — L40.50 ARTHROPATHIC PSORIASIS, UNSPECIFIED (HCC): Primary | ICD-10-CM

## 2024-06-14 DIAGNOSIS — E55.9 VITAMIN D DEFICIENCY, UNSPECIFIED: ICD-10-CM

## 2024-06-14 DIAGNOSIS — F43.10 PTSD (POST-TRAUMATIC STRESS DISORDER): ICD-10-CM

## 2024-06-14 RX ORDER — GUSELKUMAB 100 MG/ML
INJECTION SUBCUTANEOUS
COMMUNITY
Start: 2024-06-04

## 2024-06-14 RX ORDER — ALBUTEROL SULFATE 90 UG/1
2 AEROSOL, METERED RESPIRATORY (INHALATION) EVERY 6 HOURS PRN
Qty: 18 G | Refills: 3 | Status: SHIPPED | OUTPATIENT
Start: 2024-06-14

## 2024-06-14 RX ORDER — HYDRALAZINE HYDROCHLORIDE 50 MG/1
TABLET, FILM COATED ORAL
Qty: 30 TABLET | Refills: 1 | Status: SHIPPED | OUTPATIENT
Start: 2024-06-14

## 2024-06-14 ASSESSMENT — PATIENT HEALTH QUESTIONNAIRE - PHQ9
SUM OF ALL RESPONSES TO PHQ QUESTIONS 1-9: 16
4. FEELING TIRED OR HAVING LITTLE ENERGY: NEARLY EVERY DAY
3. TROUBLE FALLING OR STAYING ASLEEP: SEVERAL DAYS
SUM OF ALL RESPONSES TO PHQ QUESTIONS 1-9: 16
SUM OF ALL RESPONSES TO PHQ9 QUESTIONS 1 & 2: 4
SUM OF ALL RESPONSES TO PHQ QUESTIONS 1-9: 16
9. THOUGHTS THAT YOU WOULD BE BETTER OFF DEAD, OR OF HURTING YOURSELF: NOT AT ALL
2. FEELING DOWN, DEPRESSED OR HOPELESS: MORE THAN HALF THE DAYS
7. TROUBLE CONCENTRATING ON THINGS, SUCH AS READING THE NEWSPAPER OR WATCHING TELEVISION: NEARLY EVERY DAY
5. POOR APPETITE OR OVEREATING: NEARLY EVERY DAY
10. IF YOU CHECKED OFF ANY PROBLEMS, HOW DIFFICULT HAVE THESE PROBLEMS MADE IT FOR YOU TO DO YOUR WORK, TAKE CARE OF THINGS AT HOME, OR GET ALONG WITH OTHER PEOPLE: SOMEWHAT DIFFICULT
8. MOVING OR SPEAKING SO SLOWLY THAT OTHER PEOPLE COULD HAVE NOTICED. OR THE OPPOSITE, BEING SO FIGETY OR RESTLESS THAT YOU HAVE BEEN MOVING AROUND A LOT MORE THAN USUAL: NOT AT ALL
6. FEELING BAD ABOUT YOURSELF - OR THAT YOU ARE A FAILURE OR HAVE LET YOURSELF OR YOUR FAMILY DOWN: MORE THAN HALF THE DAYS
SUM OF ALL RESPONSES TO PHQ QUESTIONS 1-9: 16
1. LITTLE INTEREST OR PLEASURE IN DOING THINGS: MORE THAN HALF THE DAYS

## 2024-06-14 ASSESSMENT — ENCOUNTER SYMPTOMS
CHEST TIGHTNESS: 0
SHORTNESS OF BREATH: 0

## 2024-06-14 NOTE — PROGRESS NOTES
Lazara Sawant presents today for   Chief Complaint   Patient presents with    Follow-up       Is someone accompanying this pt? no    Is the patient using any DME equipment during OV? no    Depression Screenin/14/2024     2:26 PM 2023     3:09 PM 2023     3:16 PM 2023     3:14 PM 10/14/2022    10:45 AM 2021    11:41 AM 3/9/2021     8:44 AM   PHQ-9 Questionaire   Little interest or pleasure in doing things 2 3 3 0 3 1 2   Feeling down, depressed, or hopeless 2 2 3 0 3 3 3   Trouble falling or staying asleep, or sleeping too much 1 2 1  2 2 0   Feeling tired or having little energy 3 3 3  3 1 0   Poor appetite or overeating 3 3 3  3 3 3   Feeling bad about yourself - or that you are a failure or have let yourself or your family down 2 3 3  3 3 1   Trouble concentrating on things, such as reading the newspaper or watching television 3 3 3  3 2 1   Moving or speaking so slowly that other people could have noticed. Or the opposite - being so fidgety or restless that you have been moving around a lot more than usual 0 1 1  0 0 0   Thoughts that you would be better off dead, or of hurting yourself in some way 0 0 0       PHQ-9 Total Score 16 20 20 0 20 15 10   If you checked off any problems, how difficult have these problems made it for you to do your work, take care of things at home, or get along with other people? 1 2 3           Fall Risk       No data to display                 Health Maintenance reviewed and discussed and ordered per Provider.    Health Maintenance Due   Topic Date Due    DTaP/Tdap/Td vaccine (1 - Tdap) Never done    Cervical cancer screen  Never done    Breast cancer screen  Never done   .        \"Have you been to the ER, urgent care clinic since your last visit?  Hospitalized since your last visit?\"    NO    “Have you seen or consulted any other health care providers outside of [facility] since your last visit?”    NO       Have you had a mammogram?” 
reviewed. Anticipated time course and progression of condition reviewed. All questions have been addressed.  She received an after visit summary, with information reviewed, and questions answered.      Where appropriate, she is instructed to call the clinic if she has not been notified either by phone or through [employer] with the results of her tests or with an appointment plan for any referrals within 1 week(s). The patient  is to call if her condition worsens or fails to improve or if significant side effects are experienced.       Rosana John, APRN - CNP

## 2024-06-17 ENCOUNTER — TELEPHONE (OUTPATIENT)
Dept: FAMILY MEDICINE CLINIC | Facility: CLINIC | Age: 49
End: 2024-06-17

## 2024-06-17 DIAGNOSIS — F41.9 ANXIETY: Primary | ICD-10-CM

## 2024-06-17 RX ORDER — HYDROXYZINE HYDROCHLORIDE 25 MG/1
50 TABLET, FILM COATED ORAL EVERY 8 HOURS PRN
Qty: 60 TABLET | Refills: 0 | Status: SHIPPED | OUTPATIENT
Start: 2024-06-17 | End: 2024-06-27

## 2024-06-17 NOTE — TELEPHONE ENCOUNTER
[facility] called stating hydralazine was sent in for anxiety and normally it is hydroxyzine. I spoke with stone and she advised me yes she meant to do hydroxyzine 50mg TID with no refills. Sent rx to [facility].

## 2024-11-12 ENCOUNTER — HOSPITAL ENCOUNTER (OUTPATIENT)
Age: 49
Setting detail: SPECIMEN
Discharge: HOME | End: 2024-11-15

## 2024-11-12 LAB — SENTARA SPECIMEN COLLECTION: NORMAL

## 2024-11-12 PROCEDURE — 99001 SPECIMEN HANDLING PT-LAB: CPT

## 2024-11-13 RX ORDER — ERGOCALCIFEROL 1.25 MG/1
50000 CAPSULE, LIQUID FILLED ORAL WEEKLY
Qty: 4 CAPSULE | Refills: 0 | Status: SHIPPED | OUTPATIENT
Start: 2024-11-13 | End: 2024-11-14 | Stop reason: SDUPTHER

## 2024-11-14 ENCOUNTER — OFFICE VISIT (OUTPATIENT)
Facility: CLINIC | Age: 49
End: 2024-11-14
Payer: MEDICAID

## 2024-11-14 ENCOUNTER — TELEPHONE (OUTPATIENT)
Facility: CLINIC | Age: 49
End: 2024-11-14

## 2024-11-14 VITALS
HEART RATE: 87 BPM | SYSTOLIC BLOOD PRESSURE: 163 MMHG | OXYGEN SATURATION: 98 % | TEMPERATURE: 97.4 F | WEIGHT: 222.4 LBS | BODY MASS INDEX: 39.41 KG/M2 | RESPIRATION RATE: 20 BRPM | HEIGHT: 63 IN | DIASTOLIC BLOOD PRESSURE: 97 MMHG

## 2024-11-14 DIAGNOSIS — I10 ESSENTIAL HYPERTENSION: Primary | ICD-10-CM

## 2024-11-14 DIAGNOSIS — E53.8 DEFICIENCY OF OTHER SPECIFIED B GROUP VITAMINS: ICD-10-CM

## 2024-11-14 LAB
A/G RATIO: 1.9 RATIO (ref 1.1–2.6)
ALBUMIN: 4.6 G/DL (ref 3.5–5)
ALP BLD-CCNC: 74 U/L (ref 25–115)
ALT SERPL-CCNC: 35 U/L (ref 5–40)
ANION GAP SERPL CALCULATED.3IONS-SCNC: 10 MMOL/L (ref 3–15)
AST SERPL-CCNC: 22 U/L (ref 10–37)
BILIRUB SERPL-MCNC: 0.4 MG/DL (ref 0.2–1.2)
BUN BLDV-MCNC: 15 MG/DL (ref 6–22)
CALCIUM SERPL-MCNC: 9.6 MG/DL (ref 8.4–10.5)
CHLORIDE BLD-SCNC: 101 MMOL/L (ref 98–110)
CHOLESTEROL, TOTAL: 260 MG/DL (ref 110–200)
CHOLESTEROL/HDL RATIO: 3.6 (ref 0–5)
CO2: 26 MMOL/L (ref 20–32)
CREAT SERPL-MCNC: 0.9 MG/DL (ref 0.5–1.2)
GFR, ESTIMATED: >60 ML/MIN/1.73 SQ.M.
GLOBULIN: 2.4 G/DL (ref 2–4)
GLUCOSE: 102 MG/DL (ref 70–99)
HCT VFR BLD CALC: 41.7 % (ref 35.1–48)
HDLC SERPL-MCNC: 73 MG/DL
HEMOGLOBIN: 13.3 G/DL (ref 11.7–16)
LDL CHOLESTEROL: 160 MG/DL (ref 50–99)
LDL/HDL RATIO: 2.2
MCH RBC QN AUTO: 31 PG (ref 26–34)
MCHC RBC AUTO-ENTMCNC: 32 G/DL (ref 31–36)
MCV RBC AUTO: 97 FL (ref 80–99)
NON-HDL CHOLESTEROL: 187 MG/DL
PDW BLD-RTO: 13.2 % (ref 10–15.5)
PLATELET # BLD: 323 K/UL (ref 140–440)
PMV BLD AUTO: 10.9 FL (ref 9–13)
POTASSIUM SERPL-SCNC: 4.9 MMOL/L (ref 3.5–5.5)
RBC # BLD: 4.28 M/UL (ref 3.8–5.2)
SODIUM BLD-SCNC: 137 MMOL/L (ref 133–145)
TOTAL PROTEIN: 7 G/DL (ref 6.4–8.3)
TRIGL SERPL-MCNC: 134 MG/DL (ref 40–149)
TSH SERPL DL<=0.05 MIU/L-ACNC: 1.27 MCU/ML (ref 0.27–4.2)
VITAMIN B-12: 365 PG/ML (ref 211–911)
VITAMIN D 25-HYDROXY: 38.6 NG/ML (ref 32–100)
VLDLC SERPL CALC-MCNC: 27 MG/DL (ref 8–30)
WBC # BLD: 8.3 K/UL (ref 4–11)

## 2024-11-14 PROCEDURE — 99214 OFFICE O/P EST MOD 30 MIN: CPT | Performed by: NURSE PRACTITIONER

## 2024-11-14 PROCEDURE — 3077F SYST BP >= 140 MM HG: CPT | Performed by: NURSE PRACTITIONER

## 2024-11-14 PROCEDURE — 3080F DIAST BP >= 90 MM HG: CPT | Performed by: NURSE PRACTITIONER

## 2024-11-14 RX ORDER — LITHIUM CARBONATE 150 MG/1
150 CAPSULE ORAL DAILY
COMMUNITY
Start: 2024-10-15

## 2024-11-14 RX ORDER — LISINOPRIL 20 MG/1
20 TABLET ORAL DAILY
Qty: 90 TABLET | Refills: 1 | Status: SHIPPED | OUTPATIENT
Start: 2024-11-14

## 2024-11-14 RX ORDER — CYANOCOBALAMIN 1000 UG/ML
INJECTION, SOLUTION INTRAMUSCULAR; SUBCUTANEOUS
Qty: 12 ML | Refills: 1 | Status: SHIPPED | OUTPATIENT
Start: 2024-11-14

## 2024-11-14 RX ORDER — ALUMINUM ZIRCONIUM OCTACHLOROHYDREX GLY 16 G/100G
GEL TOPICAL
Qty: 90 CAPSULE | Refills: 1 | Status: SHIPPED | OUTPATIENT
Start: 2024-11-14

## 2024-11-14 RX ORDER — ERGOCALCIFEROL 1.25 MG/1
50000 CAPSULE, LIQUID FILLED ORAL WEEKLY
Qty: 12 CAPSULE | Refills: 3 | Status: SHIPPED | OUTPATIENT
Start: 2024-11-14

## 2024-11-14 SDOH — ECONOMIC STABILITY: FOOD INSECURITY: WITHIN THE PAST 12 MONTHS, YOU WORRIED THAT YOUR FOOD WOULD RUN OUT BEFORE YOU GOT MONEY TO BUY MORE.: NEVER TRUE

## 2024-11-14 SDOH — ECONOMIC STABILITY: INCOME INSECURITY: HOW HARD IS IT FOR YOU TO PAY FOR THE VERY BASICS LIKE FOOD, HOUSING, MEDICAL CARE, AND HEATING?: NOT HARD AT ALL

## 2024-11-14 SDOH — ECONOMIC STABILITY: FOOD INSECURITY: WITHIN THE PAST 12 MONTHS, THE FOOD YOU BOUGHT JUST DIDN'T LAST AND YOU DIDN'T HAVE MONEY TO GET MORE.: NEVER TRUE

## 2024-11-14 ASSESSMENT — PATIENT HEALTH QUESTIONNAIRE - PHQ9
SUM OF ALL RESPONSES TO PHQ QUESTIONS 1-9: 17
SUM OF ALL RESPONSES TO PHQ9 QUESTIONS 1 & 2: 6
5. POOR APPETITE OR OVEREATING: NEARLY EVERY DAY
2. FEELING DOWN, DEPRESSED OR HOPELESS: NEARLY EVERY DAY
SUM OF ALL RESPONSES TO PHQ QUESTIONS 1-9: 17
8. MOVING OR SPEAKING SO SLOWLY THAT OTHER PEOPLE COULD HAVE NOTICED. OR THE OPPOSITE, BEING SO FIGETY OR RESTLESS THAT YOU HAVE BEEN MOVING AROUND A LOT MORE THAN USUAL: MORE THAN HALF THE DAYS
SUM OF ALL RESPONSES TO PHQ QUESTIONS 1-9: 17
4. FEELING TIRED OR HAVING LITTLE ENERGY: NEARLY EVERY DAY
7. TROUBLE CONCENTRATING ON THINGS, SUCH AS READING THE NEWSPAPER OR WATCHING TELEVISION: NEARLY EVERY DAY
1. LITTLE INTEREST OR PLEASURE IN DOING THINGS: NEARLY EVERY DAY
6. FEELING BAD ABOUT YOURSELF - OR THAT YOU ARE A FAILURE OR HAVE LET YOURSELF OR YOUR FAMILY DOWN: NOT AT ALL
3. TROUBLE FALLING OR STAYING ASLEEP: NOT AT ALL
SUM OF ALL RESPONSES TO PHQ QUESTIONS 1-9: 17
10. IF YOU CHECKED OFF ANY PROBLEMS, HOW DIFFICULT HAVE THESE PROBLEMS MADE IT FOR YOU TO DO YOUR WORK, TAKE CARE OF THINGS AT HOME, OR GET ALONG WITH OTHER PEOPLE: SOMEWHAT DIFFICULT

## 2024-11-14 NOTE — PROGRESS NOTES
Lazara Sawant presents today for   Chief Complaint   Patient presents with    Follow-up       Is someone accompanying this pt?     Is the patient using any DME equipment during OV? no    Depression Screenin/14/2024     3:32 PM 2024     2:26 PM 2023     3:09 PM 2023     3:16 PM 2023     3:14 PM 10/14/2022    10:45 AM 2021    11:41 AM   PHQ-9 Questionaire   Little interest or pleasure in doing things 3 2 3 3 0 3 1   Feeling down, depressed, or hopeless 3 2 2 3 0 3 3   Trouble falling or staying asleep, or sleeping too much 0 1 2 1  2 2   Feeling tired or having little energy 3 3 3 3  3 1   Poor appetite or overeating 3 3 3 3  3 3   Feeling bad about yourself - or that you are a failure or have let yourself or your family down 0 2 3 3  3 3   Trouble concentrating on things, such as reading the newspaper or watching television 3 3 3 3  3 2   Moving or speaking so slowly that other people could have noticed. Or the opposite - being so fidgety or restless that you have been moving around a lot more than usual 2 0 1 1  0 0   Thoughts that you would be better off dead, or of hurting yourself in some way 0 0 0 0      PHQ-9 Total Score 17 16 20 20 0 20 15   If you checked off any problems, how difficult have these problems made it for you to do your work, take care of things at home, or get along with other people? 1 1 2 3          Fall Risk       No data to display                 Health Maintenance reviewed and discussed and ordered per Provider.    Health Maintenance Due   Topic Date Due    Cervical cancer screen  Never done    Breast cancer screen  Never done    Flu vaccine (1) Never done    COVID-19 Vaccine (2023- season) Never done    Shingles vaccine (1 of 2) Never done    A1C test (Diabetic or Prediabetic)  10/18/2024   .        \"Have you been to the ER, urgent care clinic since your last visit?  Hospitalized since your last visit?\"    NO    “Have you seen or consulted

## 2024-11-14 NOTE — PROGRESS NOTES
Lazara Sawant is a 49 y.o. female presents with   Chief Complaint   Patient presents with    Follow-up        Diagnosis   1. Essential hypertension    Patient presents today for follow-up related to elevations in blood pressure home BPs confirm diagnosis of essential hypertension.  Patient not currently on BP medication.   2. Deficiency of other specified B group vitamins         BP (!) 163/97 (Site: Right Upper Arm, Position: Sitting, Cuff Size: Large Adult)   Pulse 87   Temp 97.4 °F (36.3 °C) (Temporal)   Resp 20   Ht 1.6 m (5' 3\")   Wt 100.9 kg (222 lb 6.4 oz)   SpO2 98%   BMI 39.40 kg/m²   Subjective:     Past Medical History:   Diagnosis Date    ADHD (attention deficit hyperactivity disorder) 2010    Anxiety 2010    Asthma Na    Depression 2010    Fibromyalgia     GERD (gastroesophageal reflux disease) 1994    Headache Not sure    Hypertension Not sure    IBS (irritable bowel syndrome)     Obesity Now    Psoriasis     Psoriatic arthritis (HCC)     Right hand pain      Past Surgical History:   Procedure Laterality Date    COLONOSCOPY N/A 3/23/2022    COLONOSCOPY performed by Bipin Fournier MD at [facility] ENDOSCOPY    ORTHOPEDIC SURGERY      ankle    WISDOM TOOTH EXTRACTION       Social History     Socioeconomic History    Marital status:      Spouse name: None    Number of children: None    Years of education: None    Highest education level: None   Tobacco Use    Smoking status: Passive Smoke Exposure - Never Smoker     Passive exposure: Yes    Smokeless tobacco: Never   Substance and Sexual Activity    Alcohol use: Never    Drug use: Never     Types: Marijuana (Weed)    Sexual activity: Yes     Partners: Male     Birth control/protection: None     Social Determinants of Health     Financial Resource Strain: Low Risk  (11/14/2024)    Overall Financial Resource Strain (CARDIA)     Difficulty of Paying Living Expenses: Not hard at all   Food Insecurity: No Food Insecurity (11/14/2024)    Hunger

## 2024-11-15 RX ORDER — AMLODIPINE BESYLATE 10 MG/1
10 TABLET ORAL NIGHTLY
Qty: 90 TABLET | Refills: 1 | Status: SHIPPED | OUTPATIENT
Start: 2024-11-15

## 2024-12-16 ENCOUNTER — TELEPHONE (OUTPATIENT)
Dept: FAMILY MEDICINE CLINIC | Facility: CLINIC | Age: 49
End: 2024-12-16

## 2024-12-16 NOTE — TELEPHONE ENCOUNTER
----- Message from Charley GARCIA sent at 12/16/2024 12:02 PM EST -----  Regarding: ECC Message to Provider  ECC Message to Provider    Relationship to Patient: Self     Additional Information  The shots she suggested are covered with prior authorization . Patient  were trying to reach the practice for ten business days but today the practice is close   .   --------------------------------------------------------------------------------------------------------------------------    Call Back Information: OK to respond with electronic message via [employer] portal (only for patients who have registered [employer] account)    Better to leave a text message     Preferred Call Back Number: Phone [phone]

## 2024-12-17 RX ORDER — SEMAGLUTIDE 0.25 MG/.5ML
0.25 INJECTION, SOLUTION SUBCUTANEOUS
Qty: 2 ML | Refills: 0 | Status: SHIPPED | OUTPATIENT
Start: 2024-12-17

## 2024-12-30 ENCOUNTER — OFFICE VISIT (OUTPATIENT)
Dept: FAMILY MEDICINE CLINIC | Facility: CLINIC | Age: 49
End: 2024-12-30
Payer: MEDICAID

## 2024-12-30 VITALS
BODY MASS INDEX: 39.94 KG/M2 | HEART RATE: 79 BPM | HEIGHT: 63 IN | WEIGHT: 225.4 LBS | DIASTOLIC BLOOD PRESSURE: 85 MMHG | OXYGEN SATURATION: 98 % | TEMPERATURE: 97.5 F | SYSTOLIC BLOOD PRESSURE: 136 MMHG | RESPIRATION RATE: 18 BRPM

## 2024-12-30 DIAGNOSIS — Z13.1 DIABETES MELLITUS SCREENING: Primary | ICD-10-CM

## 2024-12-30 DIAGNOSIS — I10 ESSENTIAL HYPERTENSION: ICD-10-CM

## 2024-12-30 DIAGNOSIS — E66.812 CLASS 2 OBESITY WITH BODY MASS INDEX (BMI) OF 39.0 TO 39.9 IN ADULT, UNSPECIFIED OBESITY TYPE, UNSPECIFIED WHETHER SERIOUS COMORBIDITY PRESENT: ICD-10-CM

## 2024-12-30 LAB — HBA1C MFR BLD: 6 %

## 2024-12-30 PROCEDURE — 99214 OFFICE O/P EST MOD 30 MIN: CPT | Performed by: NURSE PRACTITIONER

## 2024-12-30 PROCEDURE — 3075F SYST BP GE 130 - 139MM HG: CPT | Performed by: NURSE PRACTITIONER

## 2024-12-30 PROCEDURE — 83036 HEMOGLOBIN GLYCOSYLATED A1C: CPT | Performed by: NURSE PRACTITIONER

## 2024-12-30 PROCEDURE — 3079F DIAST BP 80-89 MM HG: CPT | Performed by: NURSE PRACTITIONER

## 2024-12-30 RX ORDER — DOXYCYCLINE HYCLATE 100 MG
100 TABLET ORAL 2 TIMES DAILY
Qty: 14 TABLET | Refills: 0 | Status: SHIPPED | OUTPATIENT
Start: 2024-12-30 | End: 2025-01-06

## 2024-12-30 NOTE — PROGRESS NOTES
Lazara Sawant presents today for   Chief Complaint   Patient presents with    Follow-up       Is someone accompanying this pt? no    Is the patient using any DME equipment during OV? no    Depression Screenin/14/2024     3:32 PM 2024     2:26 PM 2023     3:09 PM 2023     3:16 PM 2023     3:14 PM 10/14/2022    10:45 AM 2021    11:41 AM   PHQ-9 Questionaire   Little interest or pleasure in doing things 3 2 3 3 0 3 1   Feeling down, depressed, or hopeless 3 2 2 3 0 3 3   Trouble falling or staying asleep, or sleeping too much 0 1 2 1  2 2   Feeling tired or having little energy 3 3 3 3  3 1   Poor appetite or overeating 3 3 3 3  3 3   Feeling bad about yourself - or that you are a failure or have let yourself or your family down 0 2 3 3  3 3   Trouble concentrating on things, such as reading the newspaper or watching television 3 3 3 3  3 2   Moving or speaking so slowly that other people could have noticed. Or the opposite - being so fidgety or restless that you have been moving around a lot more than usual 2 0 1 1  0 0   Thoughts that you would be better off dead, or of hurting yourself in some way 0 0 0 0      PHQ-9 Total Score 17 16 20 20 0 20 15   If you checked off any problems, how difficult have these problems made it for you to do your work, take care of things at home, or get along with other people? 1 1 2 3          Fall Risk       No data to display                 Health Maintenance reviewed and discussed and ordered per Provider.    Health Maintenance Due   Topic Date Due    Cervical cancer screen  Never done    Breast cancer screen  Never done    Flu vaccine (1) Never done    COVID-19 Vaccine (2023- season) Never done    A1C test (Diabetic or Prediabetic)  10/18/2024    Shingles vaccine (1 of 2) Never done   .        \"Have you been to the ER, urgent care clinic since your last visit?  Hospitalized since your last visit?\"    NO    “Have you seen or consulted any 
Semaglutide-Weight Management  Inject 0.25 mg into the skin every 7 days              Rosana John, ADEN - CNP                          Disclaimer:    I have discussed the diagnosis with the patient and the intended plan as seen above.The patient understands our medical plan. The risks, benefits and significant side effects of all medications have been reviewed. Anticipated time course and progression of condition reviewed. All questions have been addressed.  She received an after visit summary, with information reviewed, and questions answered.      Where appropriate, she is instructed to call the clinic if she has not been notified either by phone or through [employer] with the results of her tests or with an appointment plan for any referrals within 1 week(s). The patient  is to call if her condition worsens or fails to improve or if significant side effects are experienced.       Rosana John, APRN - CNP

## 2025-01-21 ENCOUNTER — TELEPHONE (OUTPATIENT)
Dept: FAMILY MEDICINE CLINIC | Facility: CLINIC | Age: 50
End: 2025-01-21

## 2025-01-21 NOTE — TELEPHONE ENCOUNTER
Pt called very upset that her psych wouldn't refill Buspirone, Auvelity, and Lithium because they're saying that she missed appointments. The pt said that she has screenshots of where she was actually at the appt, but the psych office won't let her e-mail the screenshots. She sees Juana Gonzales with [facility] in [place]. She didn't know what to do because she knows she can't be w/o those meds.

## 2025-01-23 ENCOUNTER — TELEPHONE (OUTPATIENT)
Dept: FAMILY MEDICINE CLINIC | Facility: CLINIC | Age: 50
End: 2025-01-23

## 2025-01-23 NOTE — TELEPHONE ENCOUNTER
Spoke with [facility] and they told me the only hold up is she has to pay on her balance she has with them. I tried to call patient but unable to leave message. Will send mychart msg

## 2025-01-23 NOTE — TELEPHONE ENCOUNTER
Patient called stating her psych would not fill her meds. I called psych and they said she has to pay on her balance. I let patient know and she stated she would like to know if we will do a one time refill because she feels her psych is incompetent and will find a new one.

## 2025-01-28 DIAGNOSIS — F43.10 PTSD (POST-TRAUMATIC STRESS DISORDER): ICD-10-CM

## 2025-01-31 ENCOUNTER — HOSPITAL ENCOUNTER (OUTPATIENT)
Age: 50
Discharge: HOME | End: 2025-02-03

## 2025-01-31 LAB — SENTARA SPECIMEN COLLECTION: NORMAL

## 2025-01-31 PROCEDURE — 99001 SPECIMEN HANDLING PT-LAB: CPT

## 2025-02-02 LAB — LITHIUM LEVEL: <0.4 MMOL/L (ref 0.5–1.2)

## 2025-02-03 ENCOUNTER — OFFICE VISIT (OUTPATIENT)
Dept: FAMILY MEDICINE CLINIC | Facility: CLINIC | Age: 50
End: 2025-02-03
Payer: MEDICAID

## 2025-02-03 VITALS
WEIGHT: 222 LBS | BODY MASS INDEX: 39.34 KG/M2 | OXYGEN SATURATION: 98 % | TEMPERATURE: 97.4 F | HEART RATE: 70 BPM | RESPIRATION RATE: 18 BRPM | HEIGHT: 63 IN | SYSTOLIC BLOOD PRESSURE: 133 MMHG | DIASTOLIC BLOOD PRESSURE: 70 MMHG

## 2025-02-03 DIAGNOSIS — L40.50 PSORIATIC ARTHRITIS (HCC): ICD-10-CM

## 2025-02-03 DIAGNOSIS — F41.8 MIXED ANXIETY AND DEPRESSIVE DISORDER: ICD-10-CM

## 2025-02-03 DIAGNOSIS — Z51.81 MEDICATION MONITORING ENCOUNTER: ICD-10-CM

## 2025-02-03 DIAGNOSIS — F43.10 PTSD (POST-TRAUMATIC STRESS DISORDER): Primary | ICD-10-CM

## 2025-02-03 PROCEDURE — 99214 OFFICE O/P EST MOD 30 MIN: CPT | Performed by: NURSE PRACTITIONER

## 2025-02-03 RX ORDER — TRAMADOL HYDROCHLORIDE 50 MG/1
50 TABLET ORAL EVERY 6 HOURS PRN
Qty: 28 TABLET | Refills: 0 | Status: SHIPPED | OUTPATIENT
Start: 2025-02-03 | End: 2025-03-05

## 2025-02-03 RX ORDER — LITHIUM CARBONATE 150 MG/1
150 CAPSULE ORAL DAILY
Qty: 60 CAPSULE | Refills: 0 | Status: SHIPPED | OUTPATIENT
Start: 2025-02-03

## 2025-02-03 SDOH — ECONOMIC STABILITY: FOOD INSECURITY: WITHIN THE PAST 12 MONTHS, THE FOOD YOU BOUGHT JUST DIDN'T LAST AND YOU DIDN'T HAVE MONEY TO GET MORE.: NEVER TRUE

## 2025-02-03 SDOH — ECONOMIC STABILITY: FOOD INSECURITY: WITHIN THE PAST 12 MONTHS, YOU WORRIED THAT YOUR FOOD WOULD RUN OUT BEFORE YOU GOT MONEY TO BUY MORE.: NEVER TRUE

## 2025-02-03 ASSESSMENT — PATIENT HEALTH QUESTIONNAIRE - PHQ9
6. FEELING BAD ABOUT YOURSELF - OR THAT YOU ARE A FAILURE OR HAVE LET YOURSELF OR YOUR FAMILY DOWN: NOT AT ALL
SUM OF ALL RESPONSES TO PHQ QUESTIONS 1-9: 0
2. FEELING DOWN, DEPRESSED OR HOPELESS: NOT AT ALL
9. THOUGHTS THAT YOU WOULD BE BETTER OFF DEAD, OR OF HURTING YOURSELF: NOT AT ALL
SUM OF ALL RESPONSES TO PHQ9 QUESTIONS 1 & 2: 0
8. MOVING OR SPEAKING SO SLOWLY THAT OTHER PEOPLE COULD HAVE NOTICED. OR THE OPPOSITE, BEING SO FIGETY OR RESTLESS THAT YOU HAVE BEEN MOVING AROUND A LOT MORE THAN USUAL: NOT AT ALL
SUM OF ALL RESPONSES TO PHQ QUESTIONS 1-9: 0
SUM OF ALL RESPONSES TO PHQ QUESTIONS 1-9: 0
10. IF YOU CHECKED OFF ANY PROBLEMS, HOW DIFFICULT HAVE THESE PROBLEMS MADE IT FOR YOU TO DO YOUR WORK, TAKE CARE OF THINGS AT HOME, OR GET ALONG WITH OTHER PEOPLE: NOT DIFFICULT AT ALL
1. LITTLE INTEREST OR PLEASURE IN DOING THINGS: NOT AT ALL
5. POOR APPETITE OR OVEREATING: NOT AT ALL
3. TROUBLE FALLING OR STAYING ASLEEP: NOT AT ALL
4. FEELING TIRED OR HAVING LITTLE ENERGY: NOT AT ALL
7. TROUBLE CONCENTRATING ON THINGS, SUCH AS READING THE NEWSPAPER OR WATCHING TELEVISION: NOT AT ALL
SUM OF ALL RESPONSES TO PHQ QUESTIONS 1-9: 0

## 2025-02-03 NOTE — PROGRESS NOTES
Lazara Sawant is a 49 y.o. female presents with   Chief Complaint   Patient presents with    Follow-up        Diagnosis   1. PTSD (post-traumatic stress disorder)    Patient currently on BuSpar 15 mg twice daily ALT 45/105 mg twice daily and was supposed to be on lithium 150 mg once daily but she recently ran out.  Patient does not have a current psych I told her that I would manage medications up to 60 days she has yet to try to reach out for psych.  I did ask that she get a lithium level conducted prior to migrating to refill the medication although 150 mg once daily is a very low-dose.  She had undetectable levels on recent blood work however she states she has been out of the lithium for roughly 1 week.  She continues to deny homicidal suicidal ideation.      2. Mixed anxiety and depressive disorder          3. Medication monitoring encounter          4. psoriatic arthritis   Patient complains of increasing joint pain request tramadol refill.  She is on prednisone 5 mg once daily along with vitamin D 50,000 units q. 7 days and Tremfya     /70 (Site: Right Upper Arm, Position: Sitting, Cuff Size: Medium Adult)   Pulse 70   Temp 97.4 °F (36.3 °C) (Temporal)   Resp 18   Ht 1.6 m (5' 3\")   Wt 100.7 kg (222 lb)   SpO2 98%   BMI 39.33 kg/m²   Subjective:     Past Medical History:   Diagnosis Date    ADHD (attention deficit hyperactivity disorder) 2010    Anxiety 2010    Asthma Na    Depression 2010    Fibromyalgia     GERD (gastroesophageal reflux disease) 1994    Headache Not sure    Hypertension Not sure    IBS (irritable bowel syndrome)     Obesity Now    Psoriasis     Psoriatic arthritis (HCC)     Right hand pain      Past Surgical History:   Procedure Laterality Date    COLONOSCOPY N/A 3/23/2022    COLONOSCOPY performed by Bipin Fournier MD at [facility] ENDOSCOPY    ORTHOPEDIC SURGERY      ankle    WISDOM TOOTH EXTRACTION       Social History     Socioeconomic History    Marital status:

## 2025-02-03 NOTE — PROGRESS NOTES
Lazara Sawant presents today for   Chief Complaint   Patient presents with    Follow-up       Is someone accompanying this pt? no    Is the patient using any DME equipment during OV? no    Depression Screenin/14/2024     3:32 PM 2024     2:26 PM 2023     3:09 PM 2023     3:16 PM 2023     3:14 PM 10/14/2022    10:45 AM 2021    11:41 AM   PHQ-9 Questionaire   Little interest or pleasure in doing things 3 2 3 3 0 3 1   Feeling down, depressed, or hopeless 3 2 2 3 0 3 3   Trouble falling or staying asleep, or sleeping too much 0 1 2 1  2 2   Feeling tired or having little energy 3 3 3 3  3 1   Poor appetite or overeating 3 3 3 3  3 3   Feeling bad about yourself - or that you are a failure or have let yourself or your family down 0 2 3 3  3 3   Trouble concentrating on things, such as reading the newspaper or watching television 3 3 3 3  3 2   Moving or speaking so slowly that other people could have noticed. Or the opposite - being so fidgety or restless that you have been moving around a lot more than usual 2 0 1 1  0 0   Thoughts that you would be better off dead, or of hurting yourself in some way 0 0 0 0      PHQ-9 Total Score 17 16 20 20 0 20 15   If you checked off any problems, how difficult have these problems made it for you to do your work, take care of things at home, or get along with other people? 1 1 2 3          Fall Risk       No data to display                 Health Maintenance reviewed and discussed and ordered per Provider.    Health Maintenance Due   Topic Date Due    Cervical cancer screen  Never done    Breast cancer screen  Never done    Flu vaccine (1) Never done    COVID-19 Vaccine ( season) Never done    Shingles vaccine (1 of 2) Never done   .        \"Have you been to the ER, urgent care clinic since your last visit?  Hospitalized since your last visit?\"    NO    “Have you seen or consulted any other health care providers outside our system

## 2025-02-17 DIAGNOSIS — Z51.81 MEDICATION MONITORING ENCOUNTER: ICD-10-CM

## 2025-05-15 ENCOUNTER — OFFICE VISIT (OUTPATIENT)
Dept: FAMILY MEDICINE CLINIC | Facility: CLINIC | Age: 50
End: 2025-05-15
Payer: MEDICAID

## 2025-05-15 VITALS
OXYGEN SATURATION: 98 % | HEIGHT: 62 IN | BODY MASS INDEX: 40.85 KG/M2 | TEMPERATURE: 98.9 F | WEIGHT: 222 LBS | DIASTOLIC BLOOD PRESSURE: 82 MMHG | HEART RATE: 87 BPM | SYSTOLIC BLOOD PRESSURE: 130 MMHG | RESPIRATION RATE: 18 BRPM

## 2025-05-15 DIAGNOSIS — F41.8 MIXED ANXIETY AND DEPRESSIVE DISORDER: ICD-10-CM

## 2025-05-15 DIAGNOSIS — R79.89 ELEVATED LFTS: Primary | ICD-10-CM

## 2025-05-15 PROCEDURE — 36415 COLL VENOUS BLD VENIPUNCTURE: CPT | Performed by: NURSE PRACTITIONER

## 2025-05-15 PROCEDURE — 99213 OFFICE O/P EST LOW 20 MIN: CPT | Performed by: NURSE PRACTITIONER

## 2025-05-15 NOTE — PROGRESS NOTES
Lazara Sawant presents today for   Chief Complaint   Patient presents with    Follow-up       Is someone accompanying this pt? no    Is the patient using any DME equipment during OV? no    Depression Screenin/3/2025     1:42 PM 2024     3:32 PM 2024     2:26 PM 2023     3:09 PM 2023     3:16 PM 2023     3:14 PM 10/14/2022    10:45 AM   PHQ-9 Questionaire   Little interest or pleasure in doing things 0 3 2 3 3 0 3   Feeling down, depressed, or hopeless 0 3 2 2 3 0 3   Trouble falling or staying asleep, or sleeping too much 0 0 1 2 1  2   Feeling tired or having little energy 0 3 3 3 3  3   Poor appetite or overeating 0 3 3 3 3  3   Feeling bad about yourself - or that you are a failure or have let yourself or your family down 0 0 2 3 3  3   Trouble concentrating on things, such as reading the newspaper or watching television 0 3 3 3 3  3   Moving or speaking so slowly that other people could have noticed. Or the opposite - being so fidgety or restless that you have been moving around a lot more than usual 0 2 0 1 1  0   Thoughts that you would be better off dead, or of hurting yourself in some way 0 0 0 0 0     PHQ-9 Total Score 0 17 16 20 20 0 20   If you checked off any problems, how difficult have these problems made it for you to do your work, take care of things at home, or get along with other people? 0 1 1 2 3         Fall Risk       No data to display                 Health Maintenance reviewed and discussed and ordered per Provider.    Health Maintenance Due   Topic Date Due    Cervical cancer screen  Never done    Breast cancer screen  Never done    COVID-19 Vaccine (2024- season) Never done    Shingles vaccine (1 of 2) Never done    Pneumococcal 50+ years Vaccine (1 of 1 - PCV) Never done   .        \"Have you been to the ER, urgent care clinic since your last visit?  Hospitalized since your last visit?\"    NO    “Have you seen or consulted any other health care

## 2025-05-15 NOTE — PROGRESS NOTES
Verbal order from provider to draw labs in office    Patient presents for lab draw ordered by:    Ordering Provider:  paramjit  Ordering Department/Practice:  [facility]  Phone:  [phone]  Date Ordered:  [id number]    L[facility] by Alondra Baird LPN:    The following tubes were sent:    Gold  ( 1)    Draw site right brachial.  Patient tolerated draw with no distress.

## 2025-05-15 NOTE — PROGRESS NOTES
Lazara Sawant is a 49 y.o. female presents with   Chief Complaint   Patient presents with    Follow-up        Diagnosis   1. Elevated LFTs             2. Mixed anxiety and depressive disorder    Patient relates she is no longer with behavioral health states she has titrated herself down and off lithium continues auvelity  well as the buspirone states she is feeling much better     /82 (BP Site: Right Upper Arm, Patient Position: Sitting, BP Cuff Size: Large Adult)   Pulse 87   Temp 98.9 °F (37.2 °C) (Temporal)   Resp 18   Ht 1.575 m (5' 2\")   Wt 100.7 kg (222 lb)   SpO2 98%   BMI 40.60 kg/m²   Subjective:     Past Medical History:   Diagnosis Date    ADHD (attention deficit hyperactivity disorder) 2010    Anxiety 2010    Asthma Na    Depression 2010    Fibromyalgia     GERD (gastroesophageal reflux disease) 1994    Headache Not sure    Hypertension Not sure    IBS (irritable bowel syndrome)     Obesity Now    Psoriasis     Psoriatic arthritis (HCC)     Right hand pain      Past Surgical History:   Procedure Laterality Date    COLONOSCOPY N/A 3/23/2022    COLONOSCOPY performed by Bipin Fournier MD at [facility] ENDOSCOPY    ORTHOPEDIC SURGERY      ankle    WISDOM TOOTH EXTRACTION       Social History     Socioeconomic History    Marital status:      Spouse name: None    Number of children: None    Years of education: None    Highest education level: None   Tobacco Use    Smoking status: Never     Passive exposure: Yes    Smokeless tobacco: Never   Substance and Sexual Activity    Alcohol use: Never    Drug use: Never     Types: Marijuana (Weed)    Sexual activity: Yes     Partners: Male     Birth control/protection: None     Social Drivers of Health     Financial Resource Strain: Low Risk  (11/14/2024)    Overall Financial Resource Strain (CARDIA)     Difficulty of Paying Living Expenses: Not hard at all   Food Insecurity: No Food Insecurity (2/3/2025)    Hunger Vital Sign     Worried About Running

## 2025-05-16 LAB
A/G RATIO: 1.4 RATIO (ref 1.1–2.6)
ALBUMIN: 4.6 G/DL (ref 3.5–5)
ALP BLD-CCNC: 79 U/L (ref 25–115)
ALT SERPL-CCNC: 50 U/L (ref 5–40)
AST SERPL-CCNC: 29 U/L (ref 10–37)
BILIRUB SERPL-MCNC: 0.4 MG/DL (ref 0.2–1.2)
BILIRUBIN DIRECT: <0.2 MG/DL (ref 0–0.3)
GLOBULIN: 3.2 G/DL (ref 2–4)
TOTAL PROTEIN: 7.8 G/DL (ref 6.4–8.3)

## 2025-05-19 ENCOUNTER — RESULTS FOLLOW-UP (OUTPATIENT)
Dept: FAMILY MEDICINE CLINIC | Facility: CLINIC | Age: 50
End: 2025-05-19

## 2025-05-23 ENCOUNTER — APPOINTMENT (OUTPATIENT)
Age: 50
End: 2025-05-23
Payer: MEDICAID

## 2025-05-23 ENCOUNTER — HOSPITAL ENCOUNTER (EMERGENCY)
Age: 50
Discharge: HOME | End: 2025-05-23
Attending: EMERGENCY MEDICINE
Payer: MEDICAID

## 2025-05-23 VITALS
RESPIRATION RATE: 18 BRPM | OXYGEN SATURATION: 98 % | HEART RATE: 84 BPM | WEIGHT: 200 LBS | TEMPERATURE: 98.1 F | DIASTOLIC BLOOD PRESSURE: 93 MMHG | BODY MASS INDEX: 36.8 KG/M2 | HEIGHT: 62 IN | SYSTOLIC BLOOD PRESSURE: 165 MMHG

## 2025-05-23 DIAGNOSIS — S20.211A RIB CONTUSION, RIGHT, INITIAL ENCOUNTER: Primary | ICD-10-CM

## 2025-05-23 DIAGNOSIS — R91.8 RIGHT LOWER LOBE LUNG MASS: ICD-10-CM

## 2025-05-23 LAB
ALBUMIN SERPL-MCNC: 3.9 G/DL (ref 3.4–5)
ALBUMIN/GLOB SERPL: 1.1
ALP SERPL-CCNC: 74 U/L (ref 45–117)
ALT SERPL-CCNC: 60 U/L (ref 10–35)
ANION GAP SERPL CALC-SCNC: 14 MMOL/L
APPEARANCE UR: ABNORMAL
AST SERPL W P-5'-P-CCNC: 38 U/L (ref 10–38)
BACTERIA URNS QL MICRO: ABNORMAL /HPF
BASOPHILS # BLD: 0.05 K/UL (ref 0–0.1)
BASOPHILS NFR BLD: 0.6 % (ref 0–2)
BILIRUB SERPL-MCNC: 0.5 MG/DL (ref 0.2–1)
BILIRUB UR QL: NEGATIVE
BUN SERPL-MCNC: 9 MG/DL (ref 6–23)
BUN/CREAT SERPL: 10
CA-I BLD-MCNC: 9.9 MG/DL (ref 8.5–10.1)
CHLORIDE SERPL-SCNC: 103 MMOL/L (ref 98–107)
CO2 SERPL-SCNC: 24 MMOL/L (ref 21–32)
COLOR UR: YELLOW
CREAT SERPL-MCNC: 0.96 MG/DL (ref 0.6–1.3)
DIFFERENTIAL METHOD BLD: NORMAL
EOSINOPHIL # BLD: 0.24 K/UL (ref 0–0.4)
EOSINOPHIL NFR BLD: 2.6 % (ref 0–5)
EPITH CASTS URNS QL MICRO: ABNORMAL /LPF (ref 0–20)
ERYTHROCYTE [DISTWIDTH] IN BLOOD BY AUTOMATED COUNT: 12.5 % (ref 11.6–14.5)
GLOBULIN SER CALC-MCNC: 3.7 G/DL
GLUCOSE SERPL-MCNC: 99 MG/DL (ref 74–108)
GLUCOSE UR STRIP.AUTO-MCNC: NEGATIVE MG/DL
HCT VFR BLD AUTO: 39.7 % (ref 35–45)
HGB BLD-MCNC: 13.4 G/DL (ref 12–16)
HGB UR QL STRIP: ABNORMAL
IMM GRANULOCYTES # BLD AUTO: 0.03 K/UL (ref 0–0.04)
IMM GRANULOCYTES NFR BLD AUTO: 0.3 % (ref 0–0.5)
KETONES UR QL STRIP.AUTO: ABNORMAL MG/DL
LEUKOCYTE ESTERASE UR QL STRIP.AUTO: ABNORMAL
LYMPHOCYTES # BLD: 2.93 K/UL (ref 0.9–3.6)
LYMPHOCYTES NFR BLD: 32.3 % (ref 21–52)
MCH RBC QN AUTO: 30.4 PG (ref 24–34)
MCHC RBC AUTO-ENTMCNC: 33.8 G/DL (ref 31–37)
MCV RBC AUTO: 90 FL (ref 78–100)
MONOCYTES # BLD: 0.59 K/UL (ref 0.05–1.2)
MONOCYTES NFR BLD: 6.5 % (ref 3–10)
NEUTS SEG # BLD: 5.22 K/UL (ref 1.8–8)
NEUTS SEG NFR BLD: 57.7 % (ref 40–73)
NITRITE UR QL STRIP.AUTO: NEGATIVE
NRBC # BLD: 0 K/UL (ref 0–0.01)
NRBC BLD-RTO: 0 PER 100 WBC
PH UR STRIP: 5.5 (ref 5–8)
PLATELET # BLD AUTO: 325 K/UL (ref 135–420)
PMV BLD AUTO: 9.3 FL (ref 9.2–11.8)
POTASSIUM SERPL-SCNC: 3.8 MMOL/L (ref 3.5–5.5)
PROT SERPL-MCNC: 7.6 G/DL (ref 6.4–8.2)
PROT UR STRIP-MCNC: NEGATIVE MG/DL
RBC # BLD AUTO: 4.41 M/UL (ref 4.2–5.3)
RBC #/AREA URNS HPF: ABNORMAL /HPF (ref 0–2)
SODIUM SERPL-SCNC: 141 MMOL/L (ref 136–145)
SP GR UR REFRACTOMETRY: 1.02 (ref 1–1.03)
UROBILINOGEN UR QL STRIP.AUTO: 1 EU/DL (ref 0.2–1)
WBC # BLD AUTO: 9.1 K/UL (ref 4.6–13.2)
WBC URNS QL MICRO: ABNORMAL /HPF (ref 0–4)

## 2025-05-23 PROCEDURE — 71275 CT ANGIOGRAPHY CHEST: CPT

## 2025-05-23 PROCEDURE — 96374 THER/PROPH/DIAG INJ IV PUSH: CPT

## 2025-05-23 PROCEDURE — 80053 COMPREHEN METABOLIC PANEL: CPT

## 2025-05-23 PROCEDURE — 6360000004 HC RX CONTRAST MEDICATION: Performed by: EMERGENCY MEDICINE

## 2025-05-23 PROCEDURE — 85025 COMPLETE CBC W/AUTO DIFF WBC: CPT

## 2025-05-23 PROCEDURE — 99285 EMERGENCY DEPT VISIT HI MDM: CPT

## 2025-05-23 PROCEDURE — 6360000002 HC RX W HCPCS: Performed by: EMERGENCY MEDICINE

## 2025-05-23 PROCEDURE — 81001 URINALYSIS AUTO W/SCOPE: CPT

## 2025-05-23 PROCEDURE — 71101 X-RAY EXAM UNILAT RIBS/CHEST: CPT

## 2025-05-23 RX ORDER — IOPAMIDOL 755 MG/ML
100 INJECTION, SOLUTION INTRAVASCULAR
Status: COMPLETED | OUTPATIENT
Start: 2025-05-23 | End: 2025-05-23

## 2025-05-23 RX ORDER — KETOROLAC TROMETHAMINE 10 MG/1
10 TABLET, FILM COATED ORAL EVERY 6 HOURS PRN
Qty: 20 TABLET | Refills: 0 | Status: SHIPPED | OUTPATIENT
Start: 2025-05-23

## 2025-05-23 RX ORDER — KETOROLAC TROMETHAMINE 30 MG/ML
30 INJECTION, SOLUTION INTRAMUSCULAR; INTRAVENOUS ONCE
Status: COMPLETED | OUTPATIENT
Start: 2025-05-23 | End: 2025-05-23

## 2025-05-23 RX ADMIN — KETOROLAC TROMETHAMINE 30 MG: 30 INJECTION, SOLUTION INTRAMUSCULAR at 18:44

## 2025-05-23 RX ADMIN — IOPAMIDOL 100 ML: 755 INJECTION, SOLUTION INTRAVENOUS at 18:02

## 2025-05-23 ASSESSMENT — PAIN SCALES - GENERAL
PAINLEVEL_OUTOF10: 8
PAINLEVEL_OUTOF10: 7
PAINLEVEL_OUTOF10: 8

## 2025-05-23 ASSESSMENT — PAIN DESCRIPTION - ORIENTATION
ORIENTATION: RIGHT

## 2025-05-23 ASSESSMENT — PAIN DESCRIPTION - LOCATION
LOCATION: CHEST
LOCATION: RIB CAGE
LOCATION: RIB CAGE

## 2025-05-23 ASSESSMENT — LIFESTYLE VARIABLES
HOW OFTEN DO YOU HAVE A DRINK CONTAINING ALCOHOL: NEVER
HOW MANY STANDARD DRINKS CONTAINING ALCOHOL DO YOU HAVE ON A TYPICAL DAY: PATIENT DOES NOT DRINK

## 2025-05-23 ASSESSMENT — PAIN DESCRIPTION - DESCRIPTORS: DESCRIPTORS: ACHING

## 2025-05-23 ASSESSMENT — PAIN - FUNCTIONAL ASSESSMENT
PAIN_FUNCTIONAL_ASSESSMENT: 0-10
PAIN_FUNCTIONAL_ASSESSMENT: 0-10

## 2025-05-23 NOTE — ED PROVIDER NOTES
[facility] EMERGENCY DEPARTMENT  EMERGENCY DEPARTMENT ENCOUNTER    Patient Name: Lazara Sawant  MRN: [id number]  YOB: 1975  Provider: Randell Almonte DO  PCP: Rosana John APRN - CNP   Time/Date of evaluation: 7:00 PM EDT on 5/23/25    History of Presenting Illness     History Provided by: Patient  History is limited by: Nothing     HISTORY:   Lazara Sawant is a 49 y.o. female, presents with the chief complaint of \"I took a fall and fell onto some stuff and hurt myself and now I have uh like a bulging area right here and it hurts.\" The incident occurred on Wednesday, approximately 2-3 days prior to the visit. She reports noticing a bulging area and experiencing pain, including discomfort around her back. The patient expresses concern about potentially tearing something and developing an infection.  Lazara describes little bruising in the affected area, with pain on the first day and significant swelling on the second day. She mentions falling onto her 's construction equipment on the front porch while attempting to straighten it out. The patient reports difficulty lying back fully, finding it more comfortable to keep her leg down.  Additional Context:  Took 4 ibuprofen for pain, but it did not alleviate the swelling or discomfort  Expresses concern about potential infection due to back pain  Past Medical History:  Fibromyalgia  Psoriatic arthritis  Psoriasis  Right shoulder injury (suspected rotator cuff tear)  History of metal removal from leg  Surgical History:  Surgery to remove metal from leg  Medications:  No current medications mentioned  Allergies:  Penicillin (rash)  Notes possible allergy to fillers in generic medications  Social History:  Occupation: Homemaker  Alcohol use: Occasional  Smoking: Former social smoker  Family History:  Father: Diabetes, neuropathy, heart problems (recent defibrillator placement)  Mother: Vertigo, arthritis  Nursing Notes were all  consistent with a right lower lobe pulmonary nodule.    Recommendations for outpatient PET scan and biopsy.  Patient was given the following medications:  Medications   iopamidol (ISOVUE-370) 76 % injection 100 mL (100 mLs IntraVENous Given 5/23/25 1802)   ketorolac (TORADOL) injection 30 mg (30 mg IntraVENous Given 5/23/25 1844)       CONSULTS: (Who and What was discussed)  7 PM Case discussed with Dr. Tera Iglesias pulmonologist who agrees with plans for follow-up in office he states that he will have his office staff contact the patient to arrange follow-up    Chronic Conditions: See HPI    Social Determinants affecting Dx or Tx: None    Records Reviewed (source and summary of external notes): Nursing Notes, Old Medical Records, Previous Radiology Studies, and Previous Laboratory Studies    @[email or web address]@    Critical Care Time: None    SCREENING TOOLS:  None    CLINICAL MANAGEMENT TOOLS:  Not Applicable    Positive and negative test results were discussed. My clinical assessment and recommendations were discussed. They agree with the plan of discharge. Return precautions were discussed. All questions were answered and they are in agreement with plan.    7:08 PM Upon re-evaluation the patient's symptoms have improved. Pt has non-toxic appearance and condition is stable for discharge. She was informed of her results, instructed to f/u with her PCP and return to the ED upon worsening of symptoms. All questions and concerns were addressed.      Is this patient to be included in the SEP-1 core measure? No Exclusion criteria - the patient is NOT to be included for SEP-1 Core Measure due to: Infection is not suspected    MEDICATIONS ADMINISTERED IN THE ED:  Medications   iopamidol (ISOVUE-370) 76 % injection 100 mL (100 mLs IntraVENous Given 5/23/25 1802)   ketorolac (TORADOL) injection 30 mg (30 mg IntraVENous Given 5/23/25 1844)            None    Critical Care: None    Diagnosis and Disposition   Diagnosis:   1. Rib

## 2025-05-23 NOTE — ED NOTES
Bedside shift change report given to javier beatty (oncoming nurse) by teresa (offgoing nurse). Report included the following information Nurse Handoff Report.

## 2025-05-23 NOTE — ED NOTES
I have reviewed discharge instructions with the patient and spouse.  The patient and spouse verbalized understanding.       Reviewed medication compliance, follow up with PCP/Oncology, return to ER for any new or worrisome concerns

## 2025-05-24 DIAGNOSIS — R93.89 ABNORMAL CT OF THE CHEST: ICD-10-CM

## 2025-05-24 DIAGNOSIS — R91.1 PULMONARY NODULE: Primary | ICD-10-CM

## 2025-05-28 ENCOUNTER — TELEPHONE (OUTPATIENT)
Age: 50
End: 2025-05-28

## 2025-05-28 NOTE — TELEPHONE ENCOUNTER
V/M is full and cannot leave messages   Dr Iglesias order ordered a PET/CT scan for her to complete prior to seeing me in clinic.

## 2025-05-30 ENCOUNTER — CLINICAL DOCUMENTATION (OUTPATIENT)
Age: 50
End: 2025-05-30

## 2025-05-30 NOTE — PROGRESS NOTES
Attempted to reach pt to get a new pt apt with Dr. Iglesias for after pt's Pet scan on 6/6;  mailbox full-mychart msg sent.

## 2025-06-06 ENCOUNTER — HOSPITAL ENCOUNTER (OUTPATIENT)
Facility: HOSPITAL | Age: 50
Discharge: HOME | End: 2025-06-09
Attending: INTERNAL MEDICINE
Payer: MEDICAID

## 2025-06-06 DIAGNOSIS — R91.1 PULMONARY NODULE: ICD-10-CM

## 2025-06-06 DIAGNOSIS — R93.89 ABNORMAL CT OF THE CHEST: ICD-10-CM

## 2025-06-06 PROCEDURE — 3430000000 HC RX DIAGNOSTIC RADIOPHARMACEUTICAL: Performed by: INTERNAL MEDICINE

## 2025-06-06 PROCEDURE — A9609 HC RX DIAGNOSTIC RADIOPHARMACEUTICAL: HCPCS | Performed by: INTERNAL MEDICINE

## 2025-06-06 PROCEDURE — 78815 PET IMAGE W/CT SKULL-THIGH: CPT

## 2025-06-06 PROCEDURE — 3430000000 HC RX DIAGNOSTIC RADIOPHARMACEUTICAL

## 2025-06-06 RX ORDER — FLUDEOXYGLUCOSE F 18 200 MCI/ML
9.4 INJECTION, SOLUTION INTRAVENOUS
Status: COMPLETED | OUTPATIENT
Start: 2025-06-06 | End: 2025-06-06

## 2025-06-06 RX ADMIN — FLUDEOXYGLUCOSE F 18 9.4 MILLICURIE: 200 INJECTION, SOLUTION INTRAVENOUS at 08:11

## 2025-06-19 DIAGNOSIS — R91.1 LUNG NODULE: Primary | ICD-10-CM

## 2025-07-23 ENCOUNTER — CLINICAL SUPPORT (OUTPATIENT)
Dept: FAMILY MEDICINE CLINIC | Facility: CLINIC | Age: 50
End: 2025-07-23
Payer: MEDICAID

## 2025-07-23 DIAGNOSIS — R79.89 ELEVATED LFTS: Primary | ICD-10-CM

## 2025-07-23 PROCEDURE — 36415 COLL VENOUS BLD VENIPUNCTURE: CPT | Performed by: NURSE PRACTITIONER

## 2025-07-23 PROCEDURE — 99211 OFF/OP EST MAY X REQ PHY/QHP: CPT

## 2025-07-23 NOTE — PROGRESS NOTES
Verbal order from provider to draw labs in office    Patient presents for lab draw ordered by:    Ordering Provider:  paramjit  Ordering Department/Practice:  [facility]  Phone:  [phone]  Date Ordered:  072325    Labs were drawn and [facility] to [facility] by Alondra Baird LPN:    The following tubes were sent:    Gold  ( 1)    Draw site left brachial.  Patient tolerated draw with no distress.

## 2025-07-24 LAB
A/G RATIO: 1.6 RATIO (ref 1.1–2.6)
ALBUMIN: 4.5 G/DL (ref 3.5–5)
ALP BLD-CCNC: 82 U/L (ref 25–115)
ALT SERPL-CCNC: 64 U/L (ref 5–40)
AST SERPL-CCNC: 39 U/L (ref 10–37)
BILIRUB SERPL-MCNC: 0.4 MG/DL (ref 0.2–1.2)
BILIRUBIN DIRECT: <0.2 MG/DL (ref 0–0.3)
GLOBULIN: 2.8 G/DL (ref 2–4)
TOTAL PROTEIN: 7.3 G/DL (ref 6.4–8.3)

## (undated) DEVICE — GOWN,AURORA,FABRIC-REINFORCED,X-LARGE: Brand: MEDLINE

## (undated) DEVICE — TUBING INSUFFLATION CAP W/ EXT CARBON DIOX ENDO SMARTCAP

## (undated) DEVICE — KIT COLON W/ 1.1OZ LUB AND 2 END

## (undated) DEVICE — TUBING, SUCTION, 9/32" X 10', STRAIGHT: Brand: MEDLINE

## (undated) DEVICE — Device: Brand: DEFENDO VALVE AND CONNECTOR KIT

## (undated) DEVICE — SOL IRR STRL H2O 500ML STRL --

## (undated) DEVICE — SOL IRR STRL H2O 1000ML BTL --

## (undated) DEVICE — ENDOGATOR TUBING FOR BOSTON SCIENTIFIC ENDOSTAT II PUMP, OLYMPUS OFP PUMP OR ENDO STRATUS PUMP: Brand: ENDOGATOR